# Patient Record
Sex: MALE | Race: WHITE | NOT HISPANIC OR LATINO | URBAN - METROPOLITAN AREA
[De-identification: names, ages, dates, MRNs, and addresses within clinical notes are randomized per-mention and may not be internally consistent; named-entity substitution may affect disease eponyms.]

---

## 2017-07-17 ENCOUNTER — EMERGENCY (EMERGENCY)
Facility: HOSPITAL | Age: 72
LOS: 1 days | Discharge: HOME | End: 2017-07-17
Attending: EMERGENCY MEDICINE | Admitting: EMERGENCY MEDICINE
Payer: COMMERCIAL

## 2017-07-17 VITALS
OXYGEN SATURATION: 98 % | DIASTOLIC BLOOD PRESSURE: 82 MMHG | HEART RATE: 79 BPM | TEMPERATURE: 98 F | RESPIRATION RATE: 17 BRPM | SYSTOLIC BLOOD PRESSURE: 122 MMHG

## 2017-07-17 VITALS — WEIGHT: 169.98 LBS

## 2017-07-17 DIAGNOSIS — E03.9 HYPOTHYROIDISM, UNSPECIFIED: ICD-10-CM

## 2017-07-17 DIAGNOSIS — K62.5 HEMORRHAGE OF ANUS AND RECTUM: ICD-10-CM

## 2017-07-17 DIAGNOSIS — K64.8 OTHER HEMORRHOIDS: ICD-10-CM

## 2017-07-17 DIAGNOSIS — Z96.642 PRESENCE OF LEFT ARTIFICIAL HIP JOINT: ICD-10-CM

## 2017-07-17 DIAGNOSIS — Z88.0 ALLERGY STATUS TO PENICILLIN: ICD-10-CM

## 2017-07-17 LAB
ALBUMIN SERPL ELPH-MCNC: 3.8 G/DL — SIGNIFICANT CHANGE UP (ref 3.3–5)
ALP SERPL-CCNC: 60 U/L — SIGNIFICANT CHANGE UP (ref 40–120)
ALT FLD-CCNC: 22 U/L — SIGNIFICANT CHANGE UP (ref 12–78)
ANION GAP SERPL CALC-SCNC: 4 MMOL/L — LOW (ref 5–17)
APTT BLD: 32 SEC — SIGNIFICANT CHANGE UP (ref 27.5–37.4)
AST SERPL-CCNC: 16 U/L — SIGNIFICANT CHANGE UP (ref 15–37)
BASOPHILS # BLD AUTO: 0.1 K/UL — SIGNIFICANT CHANGE UP (ref 0–0.2)
BASOPHILS NFR BLD AUTO: 1.5 % — SIGNIFICANT CHANGE UP (ref 0–2)
BILIRUB SERPL-MCNC: 0.2 MG/DL — SIGNIFICANT CHANGE UP (ref 0.2–1.2)
BUN SERPL-MCNC: 15 MG/DL — SIGNIFICANT CHANGE UP (ref 7–23)
CALCIUM SERPL-MCNC: 8.9 MG/DL — SIGNIFICANT CHANGE UP (ref 8.5–10.1)
CHLORIDE SERPL-SCNC: 103 MMOL/L — SIGNIFICANT CHANGE UP (ref 96–108)
CO2 SERPL-SCNC: 33 MMOL/L — HIGH (ref 22–31)
CREAT SERPL-MCNC: 0.84 MG/DL — SIGNIFICANT CHANGE UP (ref 0.5–1.3)
EOSINOPHIL # BLD AUTO: 0.3 K/UL — SIGNIFICANT CHANGE UP (ref 0–0.5)
EOSINOPHIL NFR BLD AUTO: 4.1 % — SIGNIFICANT CHANGE UP (ref 0–6)
GLUCOSE SERPL-MCNC: 92 MG/DL — SIGNIFICANT CHANGE UP (ref 70–99)
HCT VFR BLD CALC: 47.4 % — SIGNIFICANT CHANGE UP (ref 39–50)
HGB BLD-MCNC: 15.2 G/DL — SIGNIFICANT CHANGE UP (ref 13–17)
INR BLD: 1.07 RATIO — SIGNIFICANT CHANGE UP (ref 0.88–1.16)
LYMPHOCYTES # BLD AUTO: 1.1 K/UL — SIGNIFICANT CHANGE UP (ref 1–3.3)
LYMPHOCYTES # BLD AUTO: 16.8 % — SIGNIFICANT CHANGE UP (ref 13–44)
MCHC RBC-ENTMCNC: 27.9 PG — SIGNIFICANT CHANGE UP (ref 27–34)
MCHC RBC-ENTMCNC: 32 GM/DL — SIGNIFICANT CHANGE UP (ref 32–36)
MCV RBC AUTO: 87.2 FL — SIGNIFICANT CHANGE UP (ref 80–100)
MONOCYTES # BLD AUTO: 1.1 K/UL — HIGH (ref 0–0.9)
MONOCYTES NFR BLD AUTO: 15.8 % — HIGH (ref 1–9)
NEUTROPHILS # BLD AUTO: 4.2 K/UL — SIGNIFICANT CHANGE UP (ref 1.8–7.4)
NEUTROPHILS NFR BLD AUTO: 61.8 % — SIGNIFICANT CHANGE UP (ref 43–77)
OB PNL STL: POSITIVE
PLATELET # BLD AUTO: 281 K/UL — SIGNIFICANT CHANGE UP (ref 150–400)
POTASSIUM SERPL-MCNC: 4 MMOL/L — SIGNIFICANT CHANGE UP (ref 3.5–5.3)
POTASSIUM SERPL-SCNC: 4 MMOL/L — SIGNIFICANT CHANGE UP (ref 3.5–5.3)
PROT SERPL-MCNC: 7.6 G/DL — SIGNIFICANT CHANGE UP (ref 6–8.3)
PROTHROM AB SERPL-ACNC: 11.7 SEC — SIGNIFICANT CHANGE UP (ref 9.8–12.7)
RBC # BLD: 5.43 M/UL — SIGNIFICANT CHANGE UP (ref 4.2–5.8)
RBC # FLD: 12.9 % — SIGNIFICANT CHANGE UP (ref 10.3–14.5)
SODIUM SERPL-SCNC: 140 MMOL/L — SIGNIFICANT CHANGE UP (ref 135–145)
WBC # BLD: 6.8 K/UL — SIGNIFICANT CHANGE UP (ref 3.8–10.5)
WBC # FLD AUTO: 6.8 K/UL — SIGNIFICANT CHANGE UP (ref 3.8–10.5)

## 2017-07-17 PROCEDURE — 99284 EMERGENCY DEPT VISIT MOD MDM: CPT

## 2017-07-17 PROCEDURE — 85610 PROTHROMBIN TIME: CPT

## 2017-07-17 PROCEDURE — 36415 COLL VENOUS BLD VENIPUNCTURE: CPT

## 2017-07-17 PROCEDURE — 82272 OCCULT BLD FECES 1-3 TESTS: CPT

## 2017-07-17 PROCEDURE — 99283 EMERGENCY DEPT VISIT LOW MDM: CPT

## 2017-07-17 PROCEDURE — 80053 COMPREHEN METABOLIC PANEL: CPT

## 2017-07-17 PROCEDURE — 85730 THROMBOPLASTIN TIME PARTIAL: CPT

## 2017-07-17 PROCEDURE — 85027 COMPLETE CBC AUTOMATED: CPT

## 2017-07-17 NOTE — ED PROVIDER NOTE - GASTROINTESTINAL, MLM
Abdomen soft, non-tender, no guarding. rectal two hemorrhoids vault empty no stool on glove mucus sent on guaiac card

## 2017-07-17 NOTE — ED ADULT NURSE NOTE - CHPI ED SYMPTOMS NEG
no vomiting/no hematuria/no dysuria/no fever/no chills/no burning urination/no nausea/no abdominal distension

## 2017-07-17 NOTE — ED ADULT NURSE NOTE - OBJECTIVE STATEMENT
Pt c/o of rectal bleeding. Pt stated he had an hemorrhoid outside anus, hemorrhoid is in rectum, no pain, frequent bleeding episodes, visited PCP 1 week ago and prescribed kaopectate and miralax, no relief. Pt denies pain, nausea, vomiting, dizziness.

## 2017-07-17 NOTE — ED PROVIDER NOTE - OBJECTIVE STATEMENT
pt is a 70 yo male who was started on miralax on 7/10/17 by his pmd dr Emerson at [facility] after he complained of diarrhea on 7/3/17. he has no pain no fever no chills and was also found to have a hemorrhoid. 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 stools are otherwise normal

## 2017-07-18 DIAGNOSIS — Z96.642 PRESENCE OF LEFT ARTIFICIAL HIP JOINT: Chronic | ICD-10-CM

## 2017-07-27 ENCOUNTER — RESULT REVIEW (OUTPATIENT)
Age: 72
End: 2017-07-27

## 2018-08-17 ENCOUNTER — RESULT REVIEW (OUTPATIENT)
Age: 73
End: 2018-08-17

## 2018-10-23 ENCOUNTER — EMERGENCY (EMERGENCY)
Facility: HOSPITAL | Age: 73
LOS: 1 days | Discharge: HOME | End: 2018-10-23
Attending: EMERGENCY MEDICINE
Payer: COMMERCIAL

## 2018-10-23 VITALS
TEMPERATURE: 98 F | OXYGEN SATURATION: 97 % | HEART RATE: 92 BPM | DIASTOLIC BLOOD PRESSURE: 86 MMHG | WEIGHT: 167.99 LBS | SYSTOLIC BLOOD PRESSURE: 147 MMHG | RESPIRATION RATE: 15 BRPM

## 2018-10-23 VITALS
OXYGEN SATURATION: 98 % | HEART RATE: 74 BPM | DIASTOLIC BLOOD PRESSURE: 60 MMHG | SYSTOLIC BLOOD PRESSURE: 120 MMHG | TEMPERATURE: 98 F | RESPIRATION RATE: 14 BRPM

## 2018-10-23 DIAGNOSIS — Z96.642 PRESENCE OF LEFT ARTIFICIAL HIP JOINT: Chronic | ICD-10-CM

## 2018-10-23 PROBLEM — E03.9 HYPOTHYROIDISM, UNSPECIFIED: Chronic | Status: ACTIVE | Noted: 2017-07-17

## 2018-10-23 LAB
ALBUMIN SERPL ELPH-MCNC: 3.8 G/DL — SIGNIFICANT CHANGE UP (ref 3.3–5)
ALP SERPL-CCNC: 66 U/L — SIGNIFICANT CHANGE UP (ref 40–120)
ALT FLD-CCNC: 29 U/L — SIGNIFICANT CHANGE UP (ref 12–78)
AMYLASE P1 CFR SERPL: 33 U/L — SIGNIFICANT CHANGE UP (ref 25–115)
ANION GAP SERPL CALC-SCNC: 7 MMOL/L — SIGNIFICANT CHANGE UP (ref 5–17)
APPEARANCE UR: ABNORMAL
APTT BLD: 40.1 SEC — HIGH (ref 27.5–37.4)
AST SERPL-CCNC: 22 U/L — SIGNIFICANT CHANGE UP (ref 15–37)
BASOPHILS # BLD AUTO: 0.05 K/UL — SIGNIFICANT CHANGE UP (ref 0–0.2)
BASOPHILS NFR BLD AUTO: 0.7 % — SIGNIFICANT CHANGE UP (ref 0–2)
BILIRUB SERPL-MCNC: 0.5 MG/DL — SIGNIFICANT CHANGE UP (ref 0.2–1.2)
BILIRUB UR-MCNC: ABNORMAL
BUN SERPL-MCNC: 13 MG/DL — SIGNIFICANT CHANGE UP (ref 7–23)
CALCIUM SERPL-MCNC: 8.9 MG/DL — SIGNIFICANT CHANGE UP (ref 8.5–10.1)
CHLORIDE SERPL-SCNC: 101 MMOL/L — SIGNIFICANT CHANGE UP (ref 96–108)
CO2 SERPL-SCNC: 28 MMOL/L — SIGNIFICANT CHANGE UP (ref 22–31)
COLOR SPEC: YELLOW — SIGNIFICANT CHANGE UP
CREAT SERPL-MCNC: 1 MG/DL — SIGNIFICANT CHANGE UP (ref 0.5–1.3)
DIFF PNL FLD: ABNORMAL
EOSINOPHIL # BLD AUTO: 0.21 K/UL — SIGNIFICANT CHANGE UP (ref 0–0.5)
EOSINOPHIL NFR BLD AUTO: 2.8 % — SIGNIFICANT CHANGE UP (ref 0–6)
GLUCOSE SERPL-MCNC: 124 MG/DL — HIGH (ref 70–99)
GLUCOSE UR QL: NEGATIVE — SIGNIFICANT CHANGE UP
HCT VFR BLD CALC: 48.9 % — SIGNIFICANT CHANGE UP (ref 39–50)
HGB BLD-MCNC: 15.7 G/DL — SIGNIFICANT CHANGE UP (ref 13–17)
IMM GRANULOCYTES NFR BLD AUTO: 0.5 % — SIGNIFICANT CHANGE UP (ref 0–1.5)
INR BLD: 1.13 RATIO — SIGNIFICANT CHANGE UP (ref 0.88–1.16)
KETONES UR-MCNC: ABNORMAL
LEUKOCYTE ESTERASE UR-ACNC: ABNORMAL
LIDOCAIN IGE QN: 66 U/L — LOW (ref 73–393)
LYMPHOCYTES # BLD AUTO: 1.24 K/UL — SIGNIFICANT CHANGE UP (ref 1–3.3)
LYMPHOCYTES # BLD AUTO: 16.3 % — SIGNIFICANT CHANGE UP (ref 13–44)
MCHC RBC-ENTMCNC: 27.5 PG — SIGNIFICANT CHANGE UP (ref 27–34)
MCHC RBC-ENTMCNC: 32.1 GM/DL — SIGNIFICANT CHANGE UP (ref 32–36)
MCV RBC AUTO: 85.6 FL — SIGNIFICANT CHANGE UP (ref 80–100)
MONOCYTES # BLD AUTO: 0.87 K/UL — SIGNIFICANT CHANGE UP (ref 0–0.9)
MONOCYTES NFR BLD AUTO: 11.4 % — SIGNIFICANT CHANGE UP (ref 2–14)
NEUTROPHILS # BLD AUTO: 5.2 K/UL — SIGNIFICANT CHANGE UP (ref 1.8–7.4)
NEUTROPHILS NFR BLD AUTO: 68.3 % — SIGNIFICANT CHANGE UP (ref 43–77)
NITRITE UR-MCNC: NEGATIVE — SIGNIFICANT CHANGE UP
PH UR: 6.5 — SIGNIFICANT CHANGE UP (ref 5–8)
PLATELET # BLD AUTO: 307 K/UL — SIGNIFICANT CHANGE UP (ref 150–400)
POTASSIUM SERPL-MCNC: 4.3 MMOL/L — SIGNIFICANT CHANGE UP (ref 3.5–5.3)
POTASSIUM SERPL-SCNC: 4.3 MMOL/L — SIGNIFICANT CHANGE UP (ref 3.5–5.3)
PROT SERPL-MCNC: 8.6 G/DL — HIGH (ref 6–8.3)
PROT UR-MCNC: NEGATIVE — SIGNIFICANT CHANGE UP
PROTHROM AB SERPL-ACNC: 12.3 SEC — SIGNIFICANT CHANGE UP (ref 9.8–12.7)
RBC # BLD: 5.71 M/UL — SIGNIFICANT CHANGE UP (ref 4.2–5.8)
RBC # FLD: 13.4 % — SIGNIFICANT CHANGE UP (ref 10.3–14.5)
SODIUM SERPL-SCNC: 136 MMOL/L — SIGNIFICANT CHANGE UP (ref 135–145)
SP GR SPEC: 1.01 — SIGNIFICANT CHANGE UP (ref 1.01–1.02)
UROBILINOGEN FLD QL: NEGATIVE — SIGNIFICANT CHANGE UP
WBC # BLD: 7.61 K/UL — SIGNIFICANT CHANGE UP (ref 3.8–10.5)
WBC # FLD AUTO: 7.61 K/UL — SIGNIFICANT CHANGE UP (ref 3.8–10.5)

## 2018-10-23 PROCEDURE — 99284 EMERGENCY DEPT VISIT MOD MDM: CPT

## 2018-10-23 PROCEDURE — 85730 THROMBOPLASTIN TIME PARTIAL: CPT

## 2018-10-23 PROCEDURE — 83690 ASSAY OF LIPASE: CPT

## 2018-10-23 PROCEDURE — 86900 BLOOD TYPING SEROLOGIC ABO: CPT

## 2018-10-23 PROCEDURE — 86901 BLOOD TYPING SEROLOGIC RH(D): CPT

## 2018-10-23 PROCEDURE — 99284 EMERGENCY DEPT VISIT MOD MDM: CPT | Mod: 25

## 2018-10-23 PROCEDURE — 86850 RBC ANTIBODY SCREEN: CPT

## 2018-10-23 PROCEDURE — 74177 CT ABD & PELVIS W/CONTRAST: CPT

## 2018-10-23 PROCEDURE — 85027 COMPLETE CBC AUTOMATED: CPT

## 2018-10-23 PROCEDURE — 81001 URINALYSIS AUTO W/SCOPE: CPT

## 2018-10-23 PROCEDURE — 80053 COMPREHEN METABOLIC PANEL: CPT

## 2018-10-23 PROCEDURE — 82150 ASSAY OF AMYLASE: CPT

## 2018-10-23 PROCEDURE — 85610 PROTHROMBIN TIME: CPT

## 2018-10-23 PROCEDURE — 36415 COLL VENOUS BLD VENIPUNCTURE: CPT

## 2018-10-23 PROCEDURE — 74177 CT ABD & PELVIS W/CONTRAST: CPT | Mod: 26

## 2018-10-23 RX ORDER — ZOLPIDEM TARTRATE 10 MG/1
1 TABLET ORAL
Qty: 0 | Refills: 0 | COMMUNITY

## 2018-10-23 RX ORDER — IOHEXOL 300 MG/ML
30 INJECTION, SOLUTION INTRAVENOUS ONCE
Qty: 0 | Refills: 0 | Status: COMPLETED | OUTPATIENT
Start: 2018-10-23 | End: 2018-10-23

## 2018-10-23 RX ADMIN — IOHEXOL 30 MILLILITER(S): 300 INJECTION, SOLUTION INTRAVENOUS at 09:43

## 2018-10-23 NOTE — ED PROVIDER NOTE - OBJECTIVE STATEMENT
72y M hx of UC on Apriso, hypothyroid, no AC here with c/o bloody stools x9 days assoc w lower abd pain. Pt reports brown stool mixed with bright red blood, 4-5 episodes per day. Pt notes lower abd pain for past 7 days. Pain initially intermittent and now constant in last 5 days or so. Pain is 10/10 at worst which occurs while defecating. Last colo was Aug 2018. No f/c. +Nausea, no vomiting. No dizziness, lightheadedness, chills.   GI-Acit  PCP-Larissa Emerson

## 2018-10-23 NOTE — ED PROVIDER NOTE - PROGRESS NOTE DETAILS
Dr. Bejarano: CT read resulted, Call out to pt GI Dr Chavira. Dr. Bejarano:  D/w GI Dr Chavira. Rec steroid taper. C/w Apriso. FU as OP.

## 2018-10-23 NOTE — ED PROVIDER NOTE - MEDICAL DECISION MAKING DETAILS
72y M not on AC hx of UC on Apriso here with abd pain x1 week and bloody stools for past 9 days. Pain now worsening. Concern for UC flare, abscess, fistula. Check labs, UA, CTAP.

## 2018-10-23 NOTE — ED ADULT NURSE NOTE - ED STAT RN HANDOFF DETAILS
Patient is reevaluated by Dr. fry. Patient got discharge by Dr. Fry . Explained all discharge instructions.

## 2018-10-23 NOTE — ED ADULT NURSE NOTE - OBJECTIVE STATEMENT
Received the patient in the Er. Patient is alert and oriented. Skin warm and dry. c/O abdominal pain. Abdomen soft and tender. C/O diarrhea with blood. As per patient he has colitis.

## 2018-10-23 NOTE — ED PROVIDER NOTE - PHYSICAL EXAMINATION
Gen: WNWD NAD  HEENT: NCAT PERRL EOMI no conjunctival pallor normal pharynx pink mm  Neck: supple  CV: RRR, no murmur  Lung: CTA BL  Abd: +BS soft ND BL lower quadrant TTP no grr, no inguinal TTP or swelling   Ext: wwp, palp pulses, FROMx4, no cce  Neuro: A&Ox3, CN grossly intact, sensation intact, motor 5/5 throughout

## 2019-01-08 ENCOUNTER — INPATIENT (INPATIENT)
Facility: HOSPITAL | Age: 74
LOS: 4 days | Discharge: HOME | DRG: 387 | End: 2019-01-13
Attending: HOSPITALIST | Admitting: HOSPITALIST
Payer: MEDICARE

## 2019-01-08 VITALS
TEMPERATURE: 98 F | DIASTOLIC BLOOD PRESSURE: 76 MMHG | OXYGEN SATURATION: 96 % | HEART RATE: 108 BPM | SYSTOLIC BLOOD PRESSURE: 121 MMHG | RESPIRATION RATE: 16 BRPM | WEIGHT: 166.01 LBS | HEIGHT: 67 IN

## 2019-01-08 DIAGNOSIS — K51.00 ULCERATIVE (CHRONIC) PANCOLITIS WITHOUT COMPLICATIONS: ICD-10-CM

## 2019-01-08 DIAGNOSIS — Z96.642 PRESENCE OF LEFT ARTIFICIAL HIP JOINT: Chronic | ICD-10-CM

## 2019-01-08 DIAGNOSIS — Z29.9 ENCOUNTER FOR PROPHYLACTIC MEASURES, UNSPECIFIED: ICD-10-CM

## 2019-01-08 DIAGNOSIS — E03.9 HYPOTHYROIDISM, UNSPECIFIED: ICD-10-CM

## 2019-01-08 LAB
ALBUMIN SERPL ELPH-MCNC: 2.8 G/DL — LOW (ref 3.3–5)
ALP SERPL-CCNC: 51 U/L — SIGNIFICANT CHANGE UP (ref 30–120)
ALT FLD-CCNC: 20 U/L DA — SIGNIFICANT CHANGE UP (ref 10–60)
ANION GAP SERPL CALC-SCNC: 10 MMOL/L — SIGNIFICANT CHANGE UP (ref 5–17)
APPEARANCE UR: CLEAR — SIGNIFICANT CHANGE UP
APTT BLD: 34.9 SEC — SIGNIFICANT CHANGE UP (ref 28.5–37)
AST SERPL-CCNC: 14 U/L — SIGNIFICANT CHANGE UP (ref 10–40)
BACTERIA # UR AUTO: ABNORMAL
BASOPHILS # BLD AUTO: 0.03 K/UL — SIGNIFICANT CHANGE UP (ref 0–0.2)
BASOPHILS NFR BLD AUTO: 0.2 % — SIGNIFICANT CHANGE UP (ref 0–2)
BILIRUB SERPL-MCNC: 0.4 MG/DL — SIGNIFICANT CHANGE UP (ref 0.2–1.2)
BILIRUB UR-MCNC: NEGATIVE — SIGNIFICANT CHANGE UP
BUN SERPL-MCNC: 8 MG/DL — SIGNIFICANT CHANGE UP (ref 7–23)
CALCIUM SERPL-MCNC: 9.2 MG/DL — SIGNIFICANT CHANGE UP (ref 8.4–10.5)
CHLORIDE SERPL-SCNC: 100 MMOL/L — SIGNIFICANT CHANGE UP (ref 96–108)
CO2 SERPL-SCNC: 28 MMOL/L — SIGNIFICANT CHANGE UP (ref 22–31)
COLOR SPEC: YELLOW — SIGNIFICANT CHANGE UP
CREAT SERPL-MCNC: 1.04 MG/DL — SIGNIFICANT CHANGE UP (ref 0.5–1.3)
DIFF PNL FLD: NEGATIVE — SIGNIFICANT CHANGE UP
EOSINOPHIL # BLD AUTO: 0.44 K/UL — SIGNIFICANT CHANGE UP (ref 0–0.5)
EOSINOPHIL NFR BLD AUTO: 3.6 % — SIGNIFICANT CHANGE UP (ref 0–6)
GLUCOSE SERPL-MCNC: 94 MG/DL — SIGNIFICANT CHANGE UP (ref 70–99)
GLUCOSE UR QL: NEGATIVE MG/DL — SIGNIFICANT CHANGE UP
HCT VFR BLD CALC: 42.3 % — SIGNIFICANT CHANGE UP (ref 39–50)
HGB BLD-MCNC: 13.8 G/DL — SIGNIFICANT CHANGE UP (ref 13–17)
IMM GRANULOCYTES NFR BLD AUTO: 1.8 % — HIGH (ref 0–1.5)
INR BLD: 1.34 RATIO — HIGH (ref 0.88–1.16)
KETONES UR-MCNC: ABNORMAL
LEUKOCYTE ESTERASE UR-ACNC: NEGATIVE — SIGNIFICANT CHANGE UP
LIDOCAIN IGE QN: 50 U/L — LOW (ref 73–393)
LYMPHOCYTES # BLD AUTO: 1.43 K/UL — SIGNIFICANT CHANGE UP (ref 1–3.3)
LYMPHOCYTES # BLD AUTO: 11.7 % — LOW (ref 13–44)
MCHC RBC-ENTMCNC: 27.5 PG — SIGNIFICANT CHANGE UP (ref 27–34)
MCHC RBC-ENTMCNC: 32.6 GM/DL — SIGNIFICANT CHANGE UP (ref 32–36)
MCV RBC AUTO: 84.4 FL — SIGNIFICANT CHANGE UP (ref 80–100)
MONOCYTES # BLD AUTO: 1.02 K/UL — HIGH (ref 0–0.9)
MONOCYTES NFR BLD AUTO: 8.4 % — SIGNIFICANT CHANGE UP (ref 2–14)
NEUTROPHILS # BLD AUTO: 9.04 K/UL — HIGH (ref 1.8–7.4)
NEUTROPHILS NFR BLD AUTO: 74.3 % — SIGNIFICANT CHANGE UP (ref 43–77)
NITRITE UR-MCNC: NEGATIVE — SIGNIFICANT CHANGE UP
NRBC # BLD: 0 /100 WBCS — SIGNIFICANT CHANGE UP (ref 0–0)
PH UR: 7 — SIGNIFICANT CHANGE UP (ref 5–8)
PLATELET # BLD AUTO: 114 K/UL — LOW (ref 150–400)
POTASSIUM SERPL-MCNC: 3.9 MMOL/L — SIGNIFICANT CHANGE UP (ref 3.5–5.3)
POTASSIUM SERPL-SCNC: 3.9 MMOL/L — SIGNIFICANT CHANGE UP (ref 3.5–5.3)
PROT SERPL-MCNC: 7.4 G/DL — SIGNIFICANT CHANGE UP (ref 6–8.3)
PROT UR-MCNC: 15 MG/DL
PROTHROM AB SERPL-ACNC: 14.7 SEC — HIGH (ref 10–12.9)
RBC # BLD: 5.01 M/UL — SIGNIFICANT CHANGE UP (ref 4.2–5.8)
RBC # FLD: 13 % — SIGNIFICANT CHANGE UP (ref 10.3–14.5)
SODIUM SERPL-SCNC: 138 MMOL/L — SIGNIFICANT CHANGE UP (ref 135–145)
SP GR SPEC: 1 — LOW (ref 1.01–1.02)
UROBILINOGEN FLD QL: NEGATIVE MG/DL — SIGNIFICANT CHANGE UP
WBC # BLD: 12.18 K/UL — HIGH (ref 3.8–10.5)
WBC # FLD AUTO: 12.18 K/UL — HIGH (ref 3.8–10.5)

## 2019-01-08 PROCEDURE — 99223 1ST HOSP IP/OBS HIGH 75: CPT | Mod: AI

## 2019-01-08 PROCEDURE — 74177 CT ABD & PELVIS W/CONTRAST: CPT | Mod: 26

## 2019-01-08 PROCEDURE — 99285 EMERGENCY DEPT VISIT HI MDM: CPT

## 2019-01-08 PROCEDURE — 93010 ELECTROCARDIOGRAM REPORT: CPT

## 2019-01-08 RX ORDER — ZOLPIDEM TARTRATE 10 MG/1
5 TABLET ORAL AT BEDTIME
Qty: 0 | Refills: 0 | Status: DISCONTINUED | OUTPATIENT
Start: 2019-01-08 | End: 2019-01-13

## 2019-01-08 RX ORDER — CIPROFLOXACIN LACTATE 400MG/40ML
400 VIAL (ML) INTRAVENOUS EVERY 12 HOURS
Qty: 0 | Refills: 0 | Status: DISCONTINUED | OUTPATIENT
Start: 2019-01-08 | End: 2019-01-09

## 2019-01-08 RX ORDER — METRONIDAZOLE 500 MG
500 TABLET ORAL ONCE
Qty: 0 | Refills: 0 | Status: COMPLETED | OUTPATIENT
Start: 2019-01-08 | End: 2019-01-08

## 2019-01-08 RX ORDER — METRONIDAZOLE 500 MG
500 TABLET ORAL EVERY 8 HOURS
Qty: 0 | Refills: 0 | Status: DISCONTINUED | OUTPATIENT
Start: 2019-01-08 | End: 2019-01-09

## 2019-01-08 RX ORDER — VANCOMYCIN HCL 1 G
125 VIAL (EA) INTRAVENOUS EVERY 6 HOURS
Qty: 0 | Refills: 0 | Status: DISCONTINUED | OUTPATIENT
Start: 2019-01-08 | End: 2019-01-09

## 2019-01-08 RX ORDER — SODIUM CHLORIDE 9 MG/ML
1000 INJECTION, SOLUTION INTRAVENOUS
Qty: 0 | Refills: 0 | Status: DISCONTINUED | OUTPATIENT
Start: 2019-01-08 | End: 2019-01-13

## 2019-01-08 RX ORDER — PIPERACILLIN AND TAZOBACTAM 4; .5 G/20ML; G/20ML
3.38 INJECTION, POWDER, LYOPHILIZED, FOR SOLUTION INTRAVENOUS ONCE
Qty: 0 | Refills: 0 | Status: DISCONTINUED | OUTPATIENT
Start: 2019-01-08 | End: 2019-01-08

## 2019-01-08 RX ORDER — MESALAMINE 400 MG
4 TABLET, DELAYED RELEASE (ENTERIC COATED) ORAL
Qty: 0 | Refills: 0 | COMMUNITY

## 2019-01-08 RX ORDER — IOHEXOL 300 MG/ML
30 INJECTION, SOLUTION INTRAVENOUS ONCE
Qty: 0 | Refills: 0 | Status: COMPLETED | OUTPATIENT
Start: 2019-01-08 | End: 2019-01-08

## 2019-01-08 RX ORDER — ZOLPIDEM TARTRATE 10 MG/1
0 TABLET ORAL
Qty: 0 | Refills: 0 | COMMUNITY

## 2019-01-08 RX ORDER — LEVOTHYROXINE SODIUM 125 MCG
75 TABLET ORAL DAILY
Qty: 0 | Refills: 0 | Status: DISCONTINUED | OUTPATIENT
Start: 2019-01-08 | End: 2019-01-13

## 2019-01-08 RX ORDER — LEVOTHYROXINE SODIUM 125 MCG
0 TABLET ORAL
Qty: 0 | Refills: 0 | COMMUNITY

## 2019-01-08 RX ORDER — SODIUM CHLORIDE 9 MG/ML
1000 INJECTION INTRAMUSCULAR; INTRAVENOUS; SUBCUTANEOUS
Qty: 0 | Refills: 0 | Status: COMPLETED | OUTPATIENT
Start: 2019-01-08 | End: 2019-01-08

## 2019-01-08 RX ADMIN — SODIUM CHLORIDE 1000 MILLILITER(S): 9 INJECTION INTRAMUSCULAR; INTRAVENOUS; SUBCUTANEOUS at 18:20

## 2019-01-08 RX ADMIN — Medication 200 MILLIGRAM(S): at 21:15

## 2019-01-08 RX ADMIN — SODIUM CHLORIDE 75 MILLILITER(S): 9 INJECTION, SOLUTION INTRAVENOUS at 21:35

## 2019-01-08 RX ADMIN — SODIUM CHLORIDE 1000 MILLILITER(S): 9 INJECTION INTRAMUSCULAR; INTRAVENOUS; SUBCUTANEOUS at 17:14

## 2019-01-08 RX ADMIN — SODIUM CHLORIDE 1000 MILLILITER(S): 9 INJECTION INTRAMUSCULAR; INTRAVENOUS; SUBCUTANEOUS at 18:45

## 2019-01-08 RX ADMIN — IOHEXOL 30 MILLILITER(S): 300 INJECTION, SOLUTION INTRAVENOUS at 17:13

## 2019-01-08 RX ADMIN — Medication 100 MILLIGRAM(S): at 22:17

## 2019-01-08 NOTE — ED ADULT NURSE NOTE - CHPI ED NUR SYMPTOMS NEG
no burning urination/no vomiting/no nausea/no abdominal distension/no blood in stool/no chills/no fever/no hematuria

## 2019-01-08 NOTE — H&P ADULT - NSHPSOCIALHISTORY_GEN_ALL_CORE
+ former smoker (quit about 20 years ago, was a 1ppk per week for about 30 years)  - denies etoh use  + did inhaled cocaine in the 80s (no IVDU)

## 2019-01-08 NOTE — H&P ADULT - NSHPPHYSICALEXAM_GEN_ALL_CORE
PHYSICAL EXAM:  Vital Signs Last 24 Hrs  T(C): 36.6 (08 Jan 2019 16:06), Max: 36.6 (08 Jan 2019 16:06)  T(F): 97.8 (08 Jan 2019 16:06), Max: 97.8 (08 Jan 2019 16:06)  HR: 108 (08 Jan 2019 16:06) (108 - 108)  BP: 121/76 (08 Jan 2019 16:06) (121/76 - 121/76)  BP(mean): --  RR: 16 (08 Jan 2019 16:06) (16 - 16)  SpO2: 96% (08 Jan 2019 16:06) (96% - 96%)    GENERAL:     NAD, well-groomed, well-developed  HEAD:     atraumatic, normocephalic  EYES:     EOMI, conjunctiva and sclera clear  ENMT:     no tonsillar erythema or exudates or enlargement, no oral lesions, moist mucous membranes, good dentition  NECK:     supple, no JVD  RESPIRATORY:     clear to auscultation bilaterally, no rales or rhonchi or wheezing or rubs  CARDIOVASCULAR:     regular rate and rhythm, no murmurs or rubs or gallops, 2+ peripheral pulses  GASTROINTESTINAL:     soft, slightly tender to palpation in LLQ, nondistended, no hepatosplenomegaly palpated, bowel sounds present  EXTREMITIES:     no clubbing or cyanosis or edema  MUSCULOSKELETAL:     no joint pain or swelling or deformities  NERVOUS SYSTEM:     motor strength intact with 5/5 B/L upper and lower extremities, no gross sensory deficits  SKIN:     no rashes or lesions  PSYCH:     appropriate, alert and orientated x3, good concentration

## 2019-01-08 NOTE — ED ADULT TRIAGE NOTE - CHIEF COMPLAINT QUOTE
" Dr Sebastian sent me here, I have left lower abdominal pain since 12/28, I have colitis with cdiff, on vancomycin "

## 2019-01-08 NOTE — H&P ADULT - NSHPLABSRESULTS_GEN_ALL_CORE
LABS:                        13.8   12.18<H> )-----------( 114<L>    ( 08 Jan 2019 17:14 )             42.3     138    |  100    |  8      ----------------------------<  94       08 Jan 2019 17:14  3.9     |  28     |  1.04         Ca 9.2           08 Jan 2019 17:14        TPro  7.4    /  Alb  2.8<L>  /  TBili  0.4    /  DBili  x      /  AST  14     /  ALT  20     /  AlkPhos  51     08 Jan 2019 17:14    PT/INR - ( 08 Jan 2019 17:14 )   PT: 14.7<H>;   INR: 1.34<H>         PTT - ( 08 Jan 2019 17:14 )  PTT:34.9     Radiology:  < from: CT Abdomen and Pelvis w/ Oral Cont and w/ IV Cont (01.08.19 @ 19:59) >    FINDINGS: There are atherosclerotic calcifications of the imaged coronary   arteries, aorta, and branch vessels. The imaged portions of the aorta are   normal in caliber. The lung bases are unremarkable.    There is hepatic steatosis. The gallbladder, biliary tree, pancreas,   spleen, and adrenal glands appear unremarkable.    There is a tiny low-attenuation focus in the left kidney which appears  unchanged. A right renal cyst appears unchanged off the lower pole. There   is no hydroureteronephrosis bilaterally. The urinary bladder appears   unremarkable.    There are multiple scattered nonspecific subcentimeter retroperitoneal   and mesenteric lymph nodes.    There is no bowel obstruction or abdominal ascites. There is diffuse   pancolonic wall thickening with adjacent fat stranding. There is also   involvement of the rectum. Colonic diverticulosis is noted. The appendix   is unremarkable.    A punctate calcification is noted within the left-sided the prostate   gland. There are moderate-sized bilateral fat containing inguinal hernias.    < end of copied text >        Multilevel degenerative changes are noted within the imaged potions of   the spine. A left-sided total hip arthroplasty is noted.    IMPRESSION: Pancolitis.

## 2019-01-08 NOTE — H&P ADULT - PROBLEM SELECTOR PLAN 3
IMPROVE VTE Individual Risk Assessment          RISK                                                          Points  [  ] Previous VTE                                                 3  [  ] Thrombophilia                                              2  [  ] Lower limb paralysis                                    2        (unable to hold up >15 seconds)    [  ] Current Cancer                                             2         (within 6 months)  [  ] Immobilization > 24 hrs                              1  [  ] ICU/CCU stay > 24 hours                            1  [X] Age > 60                                                        1    IMPROVE VTE Score 1    - will hold AC as patient is at risk for bleeding given his ulcerative colitis/bloody diarrhea history, SCD only

## 2019-01-08 NOTE — ED ADULT NURSE NOTE - OBJECTIVE STATEMENT
Sent by Dr Chavira to rule out colitis, c/o LL pain since 12/27/18. Diagnosed on 12/15 with C Diff. Symptoms resolved and returned, now on PO Vanco.

## 2019-01-08 NOTE — H&P ADULT - HISTORY OF PRESENT ILLNESS
73M with hypothyroidism and ulcerative colitis who presents with abdominal pain.  Symptoms initially started in October with diarrhea and was prescribed steroids by GI.  Had a colonoscopy that showed colitis.  In December, patient was then diagnosed with Cdiff and was prescribed 14 days of oral vancomycin (finished Esperance day).  Did not have any abdominal pain at that time.  The diarrhea would be loose and watery with intermittent bleeding.  Then patient started having LLQ pain starting New Years Yolanda (about 8-9 days ago).  No radiation.  Worse with eating but better with lying down.  Pain also abates after he defecates and moves his bowels.  Associated with decreased appetite.  Denied any fevers.  Patient spoke to GI again and was prescribed oral vancomycin again on Saturday, 1/5.  Started to have some nausea but no vomiting.   Pain did not improve the next couple of days, went to GI today, and was told to come to the ED for further evaluation and treatment.  However,  patient did admit that his pain is improving starting today.  In the ED, CT showed pancolitis.  Patient being admitted for colitis, possible Cdiff pancolitis.

## 2019-01-08 NOTE — ED PROVIDER NOTE - MEDICAL DECISION MAKING DETAILS
72 yo M, Hx of colitis, with worsening LLQ abd pain and watery diarrhea for several days. Sent for CT scan to evaluate further.

## 2019-01-08 NOTE — H&P ADULT - NSHPREVIEWOFSYSTEMS_GEN_ALL_CORE
REVIEW OF SYSTEMS:  CONSTITUTIONAL:    no fever or weight loss or fatigue  EYES:    no eye pain or visual disturbances or discharge  ENMT:     no difficulty hearing or tinnitus or vertigo, no sinus or throat pain  NECK:    no pain or stiffness  RESPIRATORY:    no cough or wheezing or chills or hemoptysis, no shortness of breath  CARDIOVASCULAR:    no chest pain or palpitations or dizziness or leg swelling  GASTROINTESTINAL:    +abdominal pain, +nausea, +diarrhea  GENITOURINARY:    no dysuria or frequency or hematuria or incontinence  NEUROLOGICAL:    no headaches or memory loss or loss of strength or numbness or tremors  SKIN:    no itching or burning or rashes or lesions   LYMPH NODES:    no enlarged glands  ENDOCRINE:    no heat or cold intolerance, no hair loss, no polydipsia or polyuria  MUSCULOSKELETAL:    no joint pain or swelling, no muscle or back or extremity pain  PSYCHIATRIC:    no depression or anxiety or mood swings or difficulty sleeping  HEME/LYMPH:    no easy bruising or bleeding gums  ALLERGY AND IMMUNOLOGIC:    no hives or eczema

## 2019-01-08 NOTE — ED ADULT NURSE NOTE - GI STOOL AMOUNT
Patient has an appointment in August. He would like to do pre-clinic lab work to include a Hemaglobin AiC. Please put orders in Epic.    medium

## 2019-01-08 NOTE — ED PROVIDER NOTE - OBJECTIVE STATEMENT
Pt is a 72 y/o M, with PMHx of hypothyroidism and colitis, presenting to the ED with c/o 10 days of abd pain and diarrhea. Pt reports LLQ abd pain that has been constant, non radiating, and getting worse. Also having multiple episodes of diarrhea a day, with mixed form stools and watery stools. Intermittent episodes of blood tinged stool. Pt had a stool culture down showing C diff and placed on vancomycin. However, had persistent pain and went to his GI today. Told he had colitis but advised to come to the ED for eval of pain. Denies fever, vomiting, urinary sxs, CP, and SOB. last colonoscopy in 10/2018 showing colitis.  Karlie REYES

## 2019-01-08 NOTE — H&P ADULT - PROBLEM SELECTOR PLAN 1
- admitted to medicine  - f/u GI (Dr. Chavira)  - cont with cipro and flagyl  - will add oral vanco for Cdiff   - f/u Cdiff  - NPO  - IVF hydration  - pain control as needed  - monitor CBC

## 2019-01-08 NOTE — H&P ADULT - ASSESSMENT
73M with hypothyroidism and ulcerative colitis who presents with abdominal pain, found to have pancolitis, likely Cdiff colitis.

## 2019-01-09 DIAGNOSIS — K52.9 NONINFECTIVE GASTROENTERITIS AND COLITIS, UNSPECIFIED: ICD-10-CM

## 2019-01-09 LAB
ALBUMIN SERPL ELPH-MCNC: 2.3 G/DL — LOW (ref 3.3–5)
ALP SERPL-CCNC: 44 U/L — SIGNIFICANT CHANGE UP (ref 30–120)
ALT FLD-CCNC: 15 U/L DA — SIGNIFICANT CHANGE UP (ref 10–60)
ANION GAP SERPL CALC-SCNC: 12 MMOL/L — SIGNIFICANT CHANGE UP (ref 5–17)
AST SERPL-CCNC: 13 U/L — SIGNIFICANT CHANGE UP (ref 10–40)
BASOPHILS # BLD AUTO: 0.03 K/UL — SIGNIFICANT CHANGE UP (ref 0–0.2)
BASOPHILS NFR BLD AUTO: 0.3 % — SIGNIFICANT CHANGE UP (ref 0–2)
BILIRUB SERPL-MCNC: 0.4 MG/DL — SIGNIFICANT CHANGE UP (ref 0.2–1.2)
BUN SERPL-MCNC: 5 MG/DL — LOW (ref 7–23)
C DIFF BY PCR RESULT: SIGNIFICANT CHANGE UP
C DIFF TOX GENS STL QL NAA+PROBE: SIGNIFICANT CHANGE UP
CALCIUM SERPL-MCNC: 8.2 MG/DL — LOW (ref 8.4–10.5)
CHLORIDE SERPL-SCNC: 100 MMOL/L — SIGNIFICANT CHANGE UP (ref 96–108)
CO2 SERPL-SCNC: 24 MMOL/L — SIGNIFICANT CHANGE UP (ref 22–31)
CREAT SERPL-MCNC: 0.92 MG/DL — SIGNIFICANT CHANGE UP (ref 0.5–1.3)
CULTURE RESULTS: SIGNIFICANT CHANGE UP
EOSINOPHIL # BLD AUTO: 0.23 K/UL — SIGNIFICANT CHANGE UP (ref 0–0.5)
EOSINOPHIL NFR BLD AUTO: 2 % — SIGNIFICANT CHANGE UP (ref 0–6)
GLUCOSE SERPL-MCNC: 100 MG/DL — HIGH (ref 70–99)
HCT VFR BLD CALC: 36.8 % — LOW (ref 39–50)
HGB BLD-MCNC: 11.8 G/DL — LOW (ref 13–17)
IMM GRANULOCYTES NFR BLD AUTO: 1 % — SIGNIFICANT CHANGE UP (ref 0–1.5)
LYMPHOCYTES # BLD AUTO: 1.08 K/UL — SIGNIFICANT CHANGE UP (ref 1–3.3)
LYMPHOCYTES # BLD AUTO: 9.5 % — LOW (ref 13–44)
MAGNESIUM SERPL-MCNC: 1.4 MG/DL — LOW (ref 1.6–2.6)
MCHC RBC-ENTMCNC: 26.8 PG — LOW (ref 27–34)
MCHC RBC-ENTMCNC: 32.1 GM/DL — SIGNIFICANT CHANGE UP (ref 32–36)
MCV RBC AUTO: 83.4 FL — SIGNIFICANT CHANGE UP (ref 80–100)
MONOCYTES # BLD AUTO: 1.09 K/UL — HIGH (ref 0–0.9)
MONOCYTES NFR BLD AUTO: 9.6 % — SIGNIFICANT CHANGE UP (ref 2–14)
NEUTROPHILS # BLD AUTO: 8.81 K/UL — HIGH (ref 1.8–7.4)
NEUTROPHILS NFR BLD AUTO: 77.6 % — HIGH (ref 43–77)
PHOSPHATE SERPL-MCNC: 3.3 MG/DL — SIGNIFICANT CHANGE UP (ref 2.5–4.5)
PLATELET # BLD AUTO: 110 K/UL — LOW (ref 150–400)
POTASSIUM SERPL-MCNC: 3.4 MMOL/L — LOW (ref 3.5–5.3)
POTASSIUM SERPL-SCNC: 3.4 MMOL/L — LOW (ref 3.5–5.3)
PROT SERPL-MCNC: 6.2 G/DL — SIGNIFICANT CHANGE UP (ref 6–8.3)
RBC # BLD: 4.41 M/UL — SIGNIFICANT CHANGE UP (ref 4.2–5.8)
RBC # FLD: 12.9 % — SIGNIFICANT CHANGE UP (ref 10.3–14.5)
SODIUM SERPL-SCNC: 136 MMOL/L — SIGNIFICANT CHANGE UP (ref 135–145)
SPECIMEN SOURCE: SIGNIFICANT CHANGE UP
TSH SERPL-MCNC: 0.82 UIU/ML — SIGNIFICANT CHANGE UP (ref 0.27–4.2)
WBC # BLD: 11.35 K/UL — HIGH (ref 3.8–10.5)
WBC # FLD AUTO: 11.35 K/UL — HIGH (ref 3.8–10.5)

## 2019-01-09 PROCEDURE — 99232 SBSQ HOSP IP/OBS MODERATE 35: CPT

## 2019-01-09 RX ORDER — MESALAMINE 400 MG
1200 TABLET, DELAYED RELEASE (ENTERIC COATED) ORAL THREE TIMES A DAY
Qty: 0 | Refills: 0 | Status: DISCONTINUED | OUTPATIENT
Start: 2019-01-09 | End: 2019-01-13

## 2019-01-09 RX ORDER — POTASSIUM CHLORIDE 20 MEQ
20 PACKET (EA) ORAL ONCE
Qty: 0 | Refills: 0 | Status: COMPLETED | OUTPATIENT
Start: 2019-01-09 | End: 2019-01-09

## 2019-01-09 RX ORDER — CHOLESTYRAMINE 4 G/9G
4 POWDER, FOR SUSPENSION ORAL DAILY
Qty: 0 | Refills: 0 | Status: DISCONTINUED | OUTPATIENT
Start: 2019-01-09 | End: 2019-01-13

## 2019-01-09 RX ORDER — MAGNESIUM SULFATE 500 MG/ML
1 VIAL (ML) INJECTION ONCE
Qty: 0 | Refills: 0 | Status: COMPLETED | OUTPATIENT
Start: 2019-01-09 | End: 2019-01-09

## 2019-01-09 RX ADMIN — CHOLESTYRAMINE 4 GRAM(S): 4 POWDER, FOR SUSPENSION ORAL at 12:00

## 2019-01-09 RX ADMIN — Medication 100 MILLIGRAM(S): at 05:33

## 2019-01-09 RX ADMIN — Medication 100 GRAM(S): at 10:19

## 2019-01-09 RX ADMIN — Medication 125 MILLIGRAM(S): at 11:59

## 2019-01-09 RX ADMIN — ZOLPIDEM TARTRATE 5 MILLIGRAM(S): 10 TABLET ORAL at 00:13

## 2019-01-09 RX ADMIN — Medication 75 MICROGRAM(S): at 05:34

## 2019-01-09 RX ADMIN — Medication 125 MILLIGRAM(S): at 05:33

## 2019-01-09 RX ADMIN — Medication 125 MILLIGRAM(S): at 00:12

## 2019-01-09 RX ADMIN — Medication 1200 MILLIGRAM(S): at 13:09

## 2019-01-09 RX ADMIN — Medication 1200 MILLIGRAM(S): at 21:24

## 2019-01-09 RX ADMIN — Medication 20 MILLIEQUIVALENT(S): at 10:19

## 2019-01-09 RX ADMIN — Medication 200 MILLIGRAM(S): at 05:33

## 2019-01-09 RX ADMIN — Medication 125 MILLIGRAM(S): at 17:56

## 2019-01-09 RX ADMIN — ZOLPIDEM TARTRATE 5 MILLIGRAM(S): 10 TABLET ORAL at 23:39

## 2019-01-09 NOTE — CONSULT NOTE ADULT - PROBLEM SELECTOR RECOMMENDATION 9
likely secondary to C diff  repeat stool pcr  vanco q 6 hours  bacid tid  id eval called  diet as tolerated  questran qd

## 2019-01-10 LAB
ALBUMIN SERPL ELPH-MCNC: 2.2 G/DL — LOW (ref 3.3–5)
ALP SERPL-CCNC: 44 U/L — SIGNIFICANT CHANGE UP (ref 30–120)
ALT FLD-CCNC: 16 U/L DA — SIGNIFICANT CHANGE UP (ref 10–60)
ANION GAP SERPL CALC-SCNC: 9 MMOL/L — SIGNIFICANT CHANGE UP (ref 5–17)
AST SERPL-CCNC: 9 U/L — LOW (ref 10–40)
BILIRUB SERPL-MCNC: 0.3 MG/DL — SIGNIFICANT CHANGE UP (ref 0.2–1.2)
BUN SERPL-MCNC: 3 MG/DL — LOW (ref 7–23)
CALCIUM SERPL-MCNC: 8.5 MG/DL — SIGNIFICANT CHANGE UP (ref 8.4–10.5)
CHLORIDE SERPL-SCNC: 101 MMOL/L — SIGNIFICANT CHANGE UP (ref 96–108)
CLOSURE TME COLL+EPINEP BLD: 312 — SIGNIFICANT CHANGE UP
CO2 SERPL-SCNC: 28 MMOL/L — SIGNIFICANT CHANGE UP (ref 22–31)
CREAT SERPL-MCNC: 0.82 MG/DL — SIGNIFICANT CHANGE UP (ref 0.5–1.3)
GLUCOSE SERPL-MCNC: 90 MG/DL — SIGNIFICANT CHANGE UP (ref 70–99)
HCT VFR BLD CALC: 38 % — LOW (ref 39–50)
HGB BLD-MCNC: 12.1 G/DL — LOW (ref 13–17)
MAGNESIUM SERPL-MCNC: 1.8 MG/DL — SIGNIFICANT CHANGE UP (ref 1.6–2.6)
MCHC RBC-ENTMCNC: 27 PG — SIGNIFICANT CHANGE UP (ref 27–34)
MCHC RBC-ENTMCNC: 31.8 GM/DL — LOW (ref 32–36)
MCV RBC AUTO: 84.8 FL — SIGNIFICANT CHANGE UP (ref 80–100)
NRBC # BLD: 0 /100 WBCS — SIGNIFICANT CHANGE UP (ref 0–0)
PHOSPHATE SERPL-MCNC: 2.9 MG/DL — SIGNIFICANT CHANGE UP (ref 2.5–4.5)
PLATELET # BLD AUTO: SIGNIFICANT CHANGE UP K/UL (ref 150–400)
POTASSIUM SERPL-MCNC: 3.3 MMOL/L — LOW (ref 3.5–5.3)
POTASSIUM SERPL-SCNC: 3.3 MMOL/L — LOW (ref 3.5–5.3)
PROT SERPL-MCNC: 6.3 G/DL — SIGNIFICANT CHANGE UP (ref 6–8.3)
RBC # BLD: 4.48 M/UL — SIGNIFICANT CHANGE UP (ref 4.2–5.8)
RBC # FLD: 13.1 % — SIGNIFICANT CHANGE UP (ref 10.3–14.5)
SODIUM SERPL-SCNC: 138 MMOL/L — SIGNIFICANT CHANGE UP (ref 135–145)
WBC # BLD: 8.58 K/UL — SIGNIFICANT CHANGE UP (ref 3.8–10.5)
WBC # FLD AUTO: 8.58 K/UL — SIGNIFICANT CHANGE UP (ref 3.8–10.5)

## 2019-01-10 PROCEDURE — 99232 SBSQ HOSP IP/OBS MODERATE 35: CPT

## 2019-01-10 RX ORDER — MORPHINE SULFATE 50 MG/1
2 CAPSULE, EXTENDED RELEASE ORAL EVERY 4 HOURS
Qty: 0 | Refills: 0 | Status: DISCONTINUED | OUTPATIENT
Start: 2019-01-10 | End: 2019-01-13

## 2019-01-10 RX ORDER — HYOSCYAMINE SULFATE 0.13 MG
0.12 TABLET ORAL
Qty: 0 | Refills: 0 | Status: DISCONTINUED | OUTPATIENT
Start: 2019-01-10 | End: 2019-01-13

## 2019-01-10 RX ORDER — POTASSIUM CHLORIDE 20 MEQ
20 PACKET (EA) ORAL ONCE
Qty: 0 | Refills: 0 | Status: COMPLETED | OUTPATIENT
Start: 2019-01-10 | End: 2019-01-10

## 2019-01-10 RX ORDER — MESALAMINE 400 MG
1000 TABLET, DELAYED RELEASE (ENTERIC COATED) ORAL AT BEDTIME
Qty: 0 | Refills: 0 | Status: DISCONTINUED | OUTPATIENT
Start: 2019-01-10 | End: 2019-01-13

## 2019-01-10 RX ORDER — MAGNESIUM SULFATE 500 MG/ML
1 VIAL (ML) INJECTION ONCE
Qty: 0 | Refills: 0 | Status: COMPLETED | OUTPATIENT
Start: 2019-01-10 | End: 2019-01-10

## 2019-01-10 RX ADMIN — MORPHINE SULFATE 2 MILLIGRAM(S): 50 CAPSULE, EXTENDED RELEASE ORAL at 13:48

## 2019-01-10 RX ADMIN — Medication 1200 MILLIGRAM(S): at 14:54

## 2019-01-10 RX ADMIN — Medication 1200 MILLIGRAM(S): at 05:39

## 2019-01-10 RX ADMIN — CHOLESTYRAMINE 4 GRAM(S): 4 POWDER, FOR SUSPENSION ORAL at 09:24

## 2019-01-10 RX ADMIN — MORPHINE SULFATE 2 MILLIGRAM(S): 50 CAPSULE, EXTENDED RELEASE ORAL at 17:58

## 2019-01-10 RX ADMIN — Medication 1200 MILLIGRAM(S): at 21:17

## 2019-01-10 RX ADMIN — Medication 1000 MILLIGRAM(S): at 21:17

## 2019-01-10 RX ADMIN — Medication 20 MILLIEQUIVALENT(S): at 12:16

## 2019-01-10 RX ADMIN — Medication 100 GRAM(S): at 12:15

## 2019-01-10 RX ADMIN — Medication 75 MICROGRAM(S): at 05:39

## 2019-01-10 RX ADMIN — MORPHINE SULFATE 2 MILLIGRAM(S): 50 CAPSULE, EXTENDED RELEASE ORAL at 09:54

## 2019-01-10 RX ADMIN — MORPHINE SULFATE 2 MILLIGRAM(S): 50 CAPSULE, EXTENDED RELEASE ORAL at 13:46

## 2019-01-10 RX ADMIN — MORPHINE SULFATE 2 MILLIGRAM(S): 50 CAPSULE, EXTENDED RELEASE ORAL at 09:24

## 2019-01-11 ENCOUNTER — TRANSCRIPTION ENCOUNTER (OUTPATIENT)
Age: 74
End: 2019-01-11

## 2019-01-11 LAB
ANION GAP SERPL CALC-SCNC: 7 MMOL/L — SIGNIFICANT CHANGE UP (ref 5–17)
BUN SERPL-MCNC: 2 MG/DL — LOW (ref 7–23)
CALCIUM SERPL-MCNC: 8.6 MG/DL — SIGNIFICANT CHANGE UP (ref 8.4–10.5)
CHLORIDE SERPL-SCNC: 102 MMOL/L — SIGNIFICANT CHANGE UP (ref 96–108)
CO2 SERPL-SCNC: 31 MMOL/L — SIGNIFICANT CHANGE UP (ref 22–31)
CREAT SERPL-MCNC: 0.88 MG/DL — SIGNIFICANT CHANGE UP (ref 0.5–1.3)
GLUCOSE SERPL-MCNC: 87 MG/DL — SIGNIFICANT CHANGE UP (ref 70–99)
HCT VFR BLD CALC: 37.9 % — LOW (ref 39–50)
HGB BLD-MCNC: 12.3 G/DL — LOW (ref 13–17)
MAGNESIUM SERPL-MCNC: 2 MG/DL — SIGNIFICANT CHANGE UP (ref 1.6–2.6)
MCHC RBC-ENTMCNC: 27.4 PG — SIGNIFICANT CHANGE UP (ref 27–34)
MCHC RBC-ENTMCNC: 32.5 GM/DL — SIGNIFICANT CHANGE UP (ref 32–36)
MCV RBC AUTO: 84.4 FL — SIGNIFICANT CHANGE UP (ref 80–100)
NRBC # BLD: 0 /100 WBCS — SIGNIFICANT CHANGE UP (ref 0–0)
PHOSPHATE SERPL-MCNC: 3.1 MG/DL — SIGNIFICANT CHANGE UP (ref 2.5–4.5)
PLATELET # BLD AUTO: 111 K/UL — LOW (ref 150–400)
POTASSIUM SERPL-MCNC: 3.8 MMOL/L — SIGNIFICANT CHANGE UP (ref 3.5–5.3)
POTASSIUM SERPL-SCNC: 3.8 MMOL/L — SIGNIFICANT CHANGE UP (ref 3.5–5.3)
RBC # BLD: 4.49 M/UL — SIGNIFICANT CHANGE UP (ref 4.2–5.8)
RBC # FLD: 13 % — SIGNIFICANT CHANGE UP (ref 10.3–14.5)
SODIUM SERPL-SCNC: 140 MMOL/L — SIGNIFICANT CHANGE UP (ref 135–145)
WBC # BLD: 7.9 K/UL — SIGNIFICANT CHANGE UP (ref 3.8–10.5)
WBC # FLD AUTO: 7.9 K/UL — SIGNIFICANT CHANGE UP (ref 3.8–10.5)

## 2019-01-11 PROCEDURE — 99232 SBSQ HOSP IP/OBS MODERATE 35: CPT

## 2019-01-11 RX ADMIN — Medication 1200 MILLIGRAM(S): at 21:52

## 2019-01-11 RX ADMIN — Medication 1200 MILLIGRAM(S): at 15:01

## 2019-01-11 RX ADMIN — ZOLPIDEM TARTRATE 5 MILLIGRAM(S): 10 TABLET ORAL at 23:33

## 2019-01-11 RX ADMIN — Medication 1000 MILLIGRAM(S): at 21:52

## 2019-01-11 RX ADMIN — Medication 1200 MILLIGRAM(S): at 05:27

## 2019-01-11 RX ADMIN — SODIUM CHLORIDE 75 MILLILITER(S): 9 INJECTION, SOLUTION INTRAVENOUS at 05:27

## 2019-01-11 RX ADMIN — Medication 75 MICROGRAM(S): at 05:27

## 2019-01-11 RX ADMIN — CHOLESTYRAMINE 4 GRAM(S): 4 POWDER, FOR SUSPENSION ORAL at 08:10

## 2019-01-11 RX ADMIN — ZOLPIDEM TARTRATE 5 MILLIGRAM(S): 10 TABLET ORAL at 00:06

## 2019-01-11 RX ADMIN — SODIUM CHLORIDE 75 MILLILITER(S): 9 INJECTION, SOLUTION INTRAVENOUS at 21:53

## 2019-01-11 NOTE — DISCHARGE NOTE ADULT - CARE PLAN
Principal Discharge DX:	Colitis  Goal:	Continue with current medications.  Outpatient follow up with GI in one week  Assessment and plan of treatment:	regular diet  Secondary Diagnosis:	Hypothyroid  Goal:	Continue with home medications Principal Discharge DX:	Colitis  Goal:	Continue with current medications.  Outpatient follow up with GI in one week  Assessment and plan of treatment:	regular diet  To continue with mesalamine po/rectal suppository as per GI.   Pt to continue with cholestyramine on daily.  Secondary Diagnosis:	Hypothyroid  Goal:	Continue with home medications

## 2019-01-11 NOTE — DISCHARGE NOTE ADULT - CARE PROVIDER_API CALL
Jhonatan Deng (DO), Gastroenterology; Internal Medicine  [address] [place] [id number]  Phone: [phone]  Fax: [phone]    bairon montiel  Phone: (   )    -  Fax: (   )    -

## 2019-01-11 NOTE — DISCHARGE NOTE ADULT - HOSPITAL COURSE
73M with hypothyroidism and ulcerative colitis who presents with abdominal pain, found to have pancolitis, likely Cdiff colitis.      1.  Pancolitis.    -Abdm CT scan shows pancolitis.    -Suspect C-diff intially.  Vanco po was d/veena as per ID once C-diff was negative  -C-diff negative.  Stool culture negative.    -Will discuss case with GI  -GI to follow up     2.  Hypothyroidism, cont with synthroid.     3.  Hypokalemia/Low magnesium.  resolved 73M with hypothyroidism and ulcerative colitis who presents with abdominal pain, found to have pancolitis, likely Cdiff colitis.      1.  Pancolitis.    -Abdm CT scan shows pancolitis.    -Suspect C-diff intially.  Vanco po was d/veena as per ID once C-diff was negative  -C-diff negative.  Stool culture negative.    To continue with cholestyramine po daily.   -GI to follow up in 2-3 weeks.     2.  Hypothyroidism, cont with synthroid.     3.  Hypokalemia/Low magnesium.  resolved

## 2019-01-11 NOTE — DISCHARGE NOTE ADULT - PATIENT PORTAL LINK FT
You can access the [facility], offered by [facility], by registering with the following website: [email or web address]

## 2019-01-11 NOTE — DISCHARGE NOTE ADULT - PLAN OF CARE
Continue with current medications.  Outpatient follow up with GI in one week regular diet Continue with home medications regular diet  To continue with mesalamine po/rectal suppository as per GI.   Pt to continue with cholestyramine on daily.

## 2019-01-11 NOTE — DISCHARGE NOTE ADULT - ADDITIONAL INSTRUCTIONS
Please follow up with your doctor in one week  please follow up with gastroenterology in one to two weeks  Please come back to the Hospital if you develop any further abdominal pain, Nausea, vomiting, diarrhea, blood per stool or any new symptoms or concerns

## 2019-01-11 NOTE — DISCHARGE NOTE ADULT - MEDICATION SUMMARY - MEDICATIONS TO TAKE
I will START or STAY ON the medications listed below when I get home from the hospital:    mesalamine 400 mg oral delayed release capsule  -- 3 cap(s) by mouth 3 times a day  -- Indication: For Ulcerative chronic pancolitis without complications    mesalamine 1000 mg rectal suppository  -- 1 suppository(ies) rectally once a day (at bedtime)  -- Indication: For Ulcerative chronic pancolitis without complications    cholestyramine 4 g/9 g oral powder for reconstitution  -- 4 gram(s) by mouth once a day  -- Indication: For diarrhea    zolpidem 5 mg oral tablet  -- 1 tab(s) by mouth once a day (at bedtime), As needed, Insomnia  -- Indication: For insomnia    levothyroxine 75 mcg (0.075 mg) oral tablet  -- 1 tab(s) by mouth once a day  -- Indication: For Hypothyroid

## 2019-01-12 LAB
ALBUMIN SERPL ELPH-MCNC: 2.7 G/DL — LOW (ref 3.3–5)
ALP SERPL-CCNC: 46 U/L — SIGNIFICANT CHANGE UP (ref 30–120)
ALT FLD-CCNC: 20 U/L DA — SIGNIFICANT CHANGE UP (ref 10–60)
ANION GAP SERPL CALC-SCNC: 10 MMOL/L — SIGNIFICANT CHANGE UP (ref 5–17)
AST SERPL-CCNC: 18 U/L — SIGNIFICANT CHANGE UP (ref 10–40)
BILIRUB SERPL-MCNC: 0.3 MG/DL — SIGNIFICANT CHANGE UP (ref 0.2–1.2)
BUN SERPL-MCNC: 3 MG/DL — LOW (ref 7–23)
CALCIUM SERPL-MCNC: 8.8 MG/DL — SIGNIFICANT CHANGE UP (ref 8.4–10.5)
CHLORIDE SERPL-SCNC: 102 MMOL/L — SIGNIFICANT CHANGE UP (ref 96–108)
CO2 SERPL-SCNC: 27 MMOL/L — SIGNIFICANT CHANGE UP (ref 22–31)
CREAT SERPL-MCNC: 0.97 MG/DL — SIGNIFICANT CHANGE UP (ref 0.5–1.3)
GLUCOSE SERPL-MCNC: 112 MG/DL — HIGH (ref 70–99)
HCT VFR BLD CALC: 41 % — SIGNIFICANT CHANGE UP (ref 39–50)
HGB BLD-MCNC: 13.3 G/DL — SIGNIFICANT CHANGE UP (ref 13–17)
MAGNESIUM SERPL-MCNC: 1.7 MG/DL — SIGNIFICANT CHANGE UP (ref 1.6–2.6)
MCHC RBC-ENTMCNC: 27.2 PG — SIGNIFICANT CHANGE UP (ref 27–34)
MCHC RBC-ENTMCNC: 32.4 GM/DL — SIGNIFICANT CHANGE UP (ref 32–36)
MCV RBC AUTO: 83.8 FL — SIGNIFICANT CHANGE UP (ref 80–100)
NRBC # BLD: 0 /100 WBCS — SIGNIFICANT CHANGE UP (ref 0–0)
PHOSPHATE SERPL-MCNC: 3.2 MG/DL — SIGNIFICANT CHANGE UP (ref 2.5–4.5)
PLATELET # BLD AUTO: 129 K/UL — LOW (ref 150–400)
POTASSIUM SERPL-MCNC: 3.5 MMOL/L — SIGNIFICANT CHANGE UP (ref 3.5–5.3)
POTASSIUM SERPL-SCNC: 3.5 MMOL/L — SIGNIFICANT CHANGE UP (ref 3.5–5.3)
PROT SERPL-MCNC: 7.1 G/DL — SIGNIFICANT CHANGE UP (ref 6–8.3)
RBC # BLD: 4.89 M/UL — SIGNIFICANT CHANGE UP (ref 4.2–5.8)
RBC # FLD: 12.9 % — SIGNIFICANT CHANGE UP (ref 10.3–14.5)
SODIUM SERPL-SCNC: 139 MMOL/L — SIGNIFICANT CHANGE UP (ref 135–145)
WBC # BLD: 8.46 K/UL — SIGNIFICANT CHANGE UP (ref 3.8–10.5)
WBC # FLD AUTO: 8.46 K/UL — SIGNIFICANT CHANGE UP (ref 3.8–10.5)

## 2019-01-12 PROCEDURE — 99233 SBSQ HOSP IP/OBS HIGH 50: CPT

## 2019-01-12 RX ADMIN — ZOLPIDEM TARTRATE 5 MILLIGRAM(S): 10 TABLET ORAL at 23:01

## 2019-01-12 RX ADMIN — Medication 1000 MILLIGRAM(S): at 21:35

## 2019-01-12 RX ADMIN — Medication 75 MICROGRAM(S): at 05:42

## 2019-01-12 RX ADMIN — CHOLESTYRAMINE 4 GRAM(S): 4 POWDER, FOR SUSPENSION ORAL at 08:52

## 2019-01-12 RX ADMIN — Medication 1200 MILLIGRAM(S): at 05:42

## 2019-01-12 RX ADMIN — Medication 1200 MILLIGRAM(S): at 14:56

## 2019-01-12 RX ADMIN — SODIUM CHLORIDE 75 MILLILITER(S): 9 INJECTION, SOLUTION INTRAVENOUS at 07:23

## 2019-01-12 RX ADMIN — Medication 1200 MILLIGRAM(S): at 21:34

## 2019-01-13 VITALS
SYSTOLIC BLOOD PRESSURE: 109 MMHG | OXYGEN SATURATION: 96 % | TEMPERATURE: 98 F | HEART RATE: 91 BPM | DIASTOLIC BLOOD PRESSURE: 64 MMHG | RESPIRATION RATE: 16 BRPM

## 2019-01-13 LAB
ANION GAP SERPL CALC-SCNC: 9 MMOL/L — SIGNIFICANT CHANGE UP (ref 5–17)
BUN SERPL-MCNC: 4 MG/DL — LOW (ref 7–23)
CALCIUM SERPL-MCNC: 9.1 MG/DL — SIGNIFICANT CHANGE UP (ref 8.4–10.5)
CHLORIDE SERPL-SCNC: 101 MMOL/L — SIGNIFICANT CHANGE UP (ref 96–108)
CO2 SERPL-SCNC: 28 MMOL/L — SIGNIFICANT CHANGE UP (ref 22–31)
CREAT SERPL-MCNC: 1.06 MG/DL — SIGNIFICANT CHANGE UP (ref 0.5–1.3)
CRP SERPL-MCNC: 1.65 MG/DL — HIGH (ref 0–0.4)
GLUCOSE SERPL-MCNC: 123 MG/DL — HIGH (ref 70–99)
HCT VFR BLD CALC: 42.4 % — SIGNIFICANT CHANGE UP (ref 39–50)
HGB BLD-MCNC: 13.6 G/DL — SIGNIFICANT CHANGE UP (ref 13–17)
MCHC RBC-ENTMCNC: 27.2 PG — SIGNIFICANT CHANGE UP (ref 27–34)
MCHC RBC-ENTMCNC: 32.1 GM/DL — SIGNIFICANT CHANGE UP (ref 32–36)
MCV RBC AUTO: 84.8 FL — SIGNIFICANT CHANGE UP (ref 80–100)
NRBC # BLD: 0 /100 WBCS — SIGNIFICANT CHANGE UP (ref 0–0)
PLATELET # BLD AUTO: 152 K/UL — SIGNIFICANT CHANGE UP (ref 150–400)
POTASSIUM SERPL-MCNC: 3.6 MMOL/L — SIGNIFICANT CHANGE UP (ref 3.5–5.3)
POTASSIUM SERPL-SCNC: 3.6 MMOL/L — SIGNIFICANT CHANGE UP (ref 3.5–5.3)
RBC # BLD: 5 M/UL — SIGNIFICANT CHANGE UP (ref 4.2–5.8)
RBC # FLD: 13.1 % — SIGNIFICANT CHANGE UP (ref 10.3–14.5)
SODIUM SERPL-SCNC: 138 MMOL/L — SIGNIFICANT CHANGE UP (ref 135–145)
WBC # BLD: 7.93 K/UL — SIGNIFICANT CHANGE UP (ref 3.8–10.5)
WBC # FLD AUTO: 7.93 K/UL — SIGNIFICANT CHANGE UP (ref 3.8–10.5)

## 2019-01-13 PROCEDURE — 85027 COMPLETE CBC AUTOMATED: CPT

## 2019-01-13 PROCEDURE — 83690 ASSAY OF LIPASE: CPT

## 2019-01-13 PROCEDURE — 86140 C-REACTIVE PROTEIN: CPT

## 2019-01-13 PROCEDURE — 36415 COLL VENOUS BLD VENIPUNCTURE: CPT

## 2019-01-13 PROCEDURE — 84100 ASSAY OF PHOSPHORUS: CPT

## 2019-01-13 PROCEDURE — 87493 C DIFF AMPLIFIED PROBE: CPT

## 2019-01-13 PROCEDURE — 74177 CT ABD & PELVIS W/CONTRAST: CPT

## 2019-01-13 PROCEDURE — 96360 HYDRATION IV INFUSION INIT: CPT

## 2019-01-13 PROCEDURE — 93005 ELECTROCARDIOGRAM TRACING: CPT

## 2019-01-13 PROCEDURE — 99239 HOSP IP/OBS DSCHRG MGMT >30: CPT

## 2019-01-13 PROCEDURE — 81001 URINALYSIS AUTO W/SCOPE: CPT

## 2019-01-13 PROCEDURE — 87507 IADNA-DNA/RNA PROBE TQ 12-25: CPT

## 2019-01-13 PROCEDURE — 99285 EMERGENCY DEPT VISIT HI MDM: CPT | Mod: 25

## 2019-01-13 PROCEDURE — 84443 ASSAY THYROID STIM HORMONE: CPT

## 2019-01-13 PROCEDURE — 80053 COMPREHEN METABOLIC PANEL: CPT

## 2019-01-13 PROCEDURE — 80048 BASIC METABOLIC PNL TOTAL CA: CPT

## 2019-01-13 PROCEDURE — 83735 ASSAY OF MAGNESIUM: CPT

## 2019-01-13 PROCEDURE — 85610 PROTHROMBIN TIME: CPT

## 2019-01-13 PROCEDURE — 85730 THROMBOPLASTIN TIME PARTIAL: CPT

## 2019-01-13 RX ORDER — MESALAMINE 400 MG
3 TABLET, DELAYED RELEASE (ENTERIC COATED) ORAL
Qty: 0 | Refills: 0 | DISCHARGE
Start: 2019-01-13

## 2019-01-13 RX ORDER — CHOLESTYRAMINE 4 G/9G
4 POWDER, FOR SUSPENSION ORAL
Qty: 30 | Refills: 0
Start: 2019-01-13 | End: 2019-02-11

## 2019-01-13 RX ORDER — LEVOTHYROXINE SODIUM 125 MCG
1 TABLET ORAL
Qty: 0 | Refills: 0 | COMMUNITY

## 2019-01-13 RX ORDER — ZOLPIDEM TARTRATE 10 MG/1
1 TABLET ORAL
Qty: 0 | Refills: 0 | DISCHARGE
Start: 2019-01-13

## 2019-01-13 RX ORDER — ZOLPIDEM TARTRATE 10 MG/1
1 TABLET ORAL
Qty: 0 | Refills: 0 | COMMUNITY

## 2019-01-13 RX ORDER — VANCOMYCIN HCL 1 G
1 VIAL (EA) INTRAVENOUS
Qty: 0 | Refills: 0 | COMMUNITY

## 2019-01-13 RX ORDER — LEVOTHYROXINE SODIUM 125 MCG
1 TABLET ORAL
Qty: 0 | Refills: 0 | DISCHARGE
Start: 2019-01-13

## 2019-01-13 RX ORDER — MESALAMINE 400 MG
1 TABLET, DELAYED RELEASE (ENTERIC COATED) ORAL
Qty: 0 | Refills: 0 | DISCHARGE
Start: 2019-01-13

## 2019-01-13 RX ADMIN — CHOLESTYRAMINE 4 GRAM(S): 4 POWDER, FOR SUSPENSION ORAL at 10:06

## 2019-01-13 RX ADMIN — Medication 1200 MILLIGRAM(S): at 05:53

## 2019-01-13 RX ADMIN — Medication 75 MICROGRAM(S): at 05:54

## 2019-01-13 NOTE — PROGRESS NOTE ADULT - SUBJECTIVE AND OBJECTIVE BOX
ABUNDIO CALDERÓN is a 73yMale , patient examined and chart reviewed.     INTERVAL HPI/ OVERNIGHT EVENTS:   Denies abd pain.  Tolerating diet. Diarrhea better.    PAST MEDICAL & SURGICAL HISTORY:  Colitis  Hypothyroid  History of left hip replacement      For details regarding the patient's social history, family history, and other miscellaneous elements, please refer the initial infectious diseases consultation and/or the admitting history and physical examination for this admission.    ROS:  CONSTITUTIONAL:  Negative fever or chills,  EYES:  Negative  blurry vision or double vision  CARDIOVASCULAR:  Negative for chest pain or palpitations  RESPIRATORY:  Negative for cough, wheezing, or SOB   GASTROINTESTINAL:  Negative for nausea, vomiting, constipation, + abdominal pain diarrhea,  GENITOURINARY:  Negative frequency, urgency or dysuria  NEUROLOGIC:  No headache, confusion, dizziness, lightheadedness  All other systems were reviewed and are negative     ALLERGIES:  penicillin (Swelling)      Current inpatient medications :    ANTIBIOTICS/RELEVANT:  NONE    MEDICATIONS  (STANDING):  cholestyramine Powder (Sugar-Free) 4 Gram(s) Oral daily  lactated ringers. 1000 milliLiter(s) (75 mL/Hr) IV Continuous <Continuous>  levothyroxine 75 MICROGram(s) Oral daily  mesalamine DR Capsule 1200 milliGRAM(s) Oral three times a day  mesalamine Suppository 1000 milliGRAM(s) Rectal at bedtime    MEDICATIONS  (PRN):  hyoscyamine SL 0.125 milliGRAM(s) SubLingual four times a day PRN cramps  morphine  - Injectable 2 milliGRAM(s) IV Push every 4 hours PRN Severe Pain (7 - 10)  zolpidem 5 milliGRAM(s) Oral at bedtime PRN Insomnia    Objective:  Vital Signs Last 24 Hrs  T(C): 37.2 (12 Jan 2019 21:11), Max: 37.2 (12 Jan 2019 21:11)  T(F): 98.9 (12 Jan 2019 21:11), Max: 98.9 (12 Jan 2019 21:11)  HR: 85 (12 Jan 2019 21:11) (72 - 90)  BP: 132/79 (12 Jan 2019 21:11) (118/77 - 156/83)  RR: 18 (12 Jan 2019 21:11) (16 - 18)  SpO2: 95% (12 Jan 2019 21:11) (93% - 97%)    Physical Exam:  General: in no acute distress  Eyes: sclera anicteric, pupils equal and reactive to light  ENMT: buccal mucosa moist, pharynx not injected  Neck: supple, trachea midline  Lungs: clear, no wheeze/rhonchi  Cardiovascular: regular rate and rhythm, S1 S2  Abdomen: soft, lower abd non tender, bowel sounds normal  Neurological: alert and oriented x3, Cranial Nerves II-XII grossly intact  Skin: no increased ecchymosis/petechiae/purpura  Lymph Nodes: no palpable cervical/supraclavicular lymph nodes enlargements  Extremities: no cyanosis/clubbing/edema    LABS:                        13.3   8.46  )-----------( 129      ( 12 Jan 2019 07:09 )             41.0   01-12    139  |  102  |  3<L>  ----------------------------<  112<H>  3.5   |  27  |  0.97    Ca    8.8      12 Jan 2019 07:13  Phos  3.2     01-12  Mg     1.7     01-12    TPro  7.1  /  Alb  2.7<L>  /  TBili  0.3  /  DBili  x   /  AST  18  /  ALT  20  /  AlkPhos  46  01-12    MICROBIOLOGY:    GI PCR Panel, Stool (collected 09 Jan 2019 21:12)  Source: .Stool Feces  Final Report (09 Jan 2019 23:09):    GI PCR Results: NOT detected    *******Please Note:*******    GI panel PCR evaluates for:    Campylobacter, Plesiomonas shigelloides, Salmonella,    Vibrio, Yersinia enterocolitica, Enteroaggregative    Escherichia coli (EAEC), Enteropathogenic E.coli (EPEC),    Enterotoxigenic E. coli (ETEC) lt/st, Shiga-like    toxin-producing E. coli (STEC) stx1/stx2,    Shigella/ Enteroinvasive E. coli (EIEC), Cryptosporidium,    Cyclospora cayetanensis, Entamoeba histolytica,    Giardia lamblia, Adenovirus F 40/41, Astrovirus,    Norovirus GI/GII, Rotavirus A, Sapovirus      Clostridium difficile Toxin by PCR (01.09.19 @ 10:37)    C Diff by PCR Result: NotDetec    RADIOLOGY & ADDITIONAL STUDIES:  EXAM:  CT ABDOMEN AND PELVIS OC IC                            PROCEDURE DATE:  01/08/2019      INTERPRETATION:  .    CLINICAL INFORMATION: Left lower quadrant abdominal pain.    TECHNIQUE: Helical axial images were obtained from the domesof the   diaphragm through the pubic symphysis. 95 mls of Omnipaque-350  was   administered intravenously without complication and 5 mls were discarded.   Oral contrast was also administered. Coronal and Sagittal reconstructions   were obtained from the source data.     COMPARISON: Prior CT examination of the abdomen and pelvis from   10/23/2018.     FINDINGS: There are atherosclerotic calcifications of the imaged coronary   arteries, aorta, and branch vessels. The imaged portions of the aorta are   normal in caliber. The lung bases are unremarkable.    There is hepatic steatosis. The gallbladder, biliary tree, pancreas,   spleen, and adrenal glands appear unremarkable.    There is a tiny low-attenuation focus in the left kidney which appears  unchanged. A right renal cyst appears unchanged off the lower pole. There   is no hydroureteronephrosis bilaterally. The urinary bladder appears   unremarkable.    There are multiple scattered nonspecific subcentimeter retroperitoneal   and mesenteric lymph nodes.    There is no bowel obstruction or abdominal ascites. There is diffuse   pancolonic wall thickening with adjacent fat stranding. There is also   involvement of the rectum. Colonic diverticulosis is noted. The appendix   is unremarkable.    A punctate calcification is noted within the left-sided the prostate   gland. There are moderate-sized bilateral fat containing inguinal hernias.    Multilevel degenerative changes are noted within the imaged potions of   the spine. A left-sided total hip arthroplasty is noted.    IMPRESSION: Pancolitis.    Assessment :   73M with hypothyroidism and UC recent Cdiff admitted with abdominal pain and diarrhea  found to have pancolitis  Has not been on systemic antibiotics but was on steroids.  Likely UC flare  Cdiff and GI PCR neg  Overall improving    Plan :   Monitor off antibiotics  Stable from ID standpoint    Continue with present regiment.  Appropriate use of antibiotics and adverse effects reviewed.    I have discussed the above plan of care with patient/ family in detail. They expressed understanding of the  treatment plan . Risks, benefits and alternatives discussed in detail. I have asked if they have any questions or concerns and appropriately addressed them to the best of my ability .    > 25 minutes were spent in direct patient care reviewing notes, medications ,labs data/ imaging , discussion with multidisciplinary team.    Thank you for allowing me to participate in care of your patient .    Nicholas Hernandez MD  Infectious Disease  [phone]
CC.  Abdm Pain/Diarrhea  HPI.  Patient reports abdm pain improving.  Diarrhea slowing down.  afebrile.  Feels nauseated at time.  Offers no other complaints              Constitutional: No fever, fatigue or weight loss.  Skin: No rash.  Eyes: No recent vision problems or eye pain.  ENT: No congestion, ear pain, or sore throat.  Endocrine: No thyroid problems.  Cardiovascular: No chest pain or palpation.  Respiratory: No cough, shortness of breath, congestion, or wheezing.  Gastrointestinal: see above  Genitourinary: No dysuria.  Musculoskeletal: No joint swelling.  Neurologic: No headache.      Vital Signs Last 24 Hrs  T(C): 36.5 (19 @ 09:21), Max: 36.8 (19 @ 05:48)  T(F): 97.7 (19 @ 09:21), Max: 98.3 (19 @ 05:48)  HR: 98 (19 @ 09:21) (98 - 112)  BP: 117/71 (19 @ 09:21) (117/71 - 153/90)  BP(mean): --  RR: 18 (19 @ 09:21) (16 - 20)  SpO2: 91% (19 @ 05:48) (91% - 97%)        PHYSICAL EXAM-  GENERAL: NAD, well-groomed, well-developed  HEAD:  Atraumatic, Normocephalic  EYES: EOMI, PERRLA, conjunctiva and sclera clear  NECK: Supple, No JVD, Normal thyroid  NERVOUS SYSTEM:  Alert & Oriented X3, Motor Strength 5/5 B/L upper and lower extremities; DTRs 2+ intact and symmetric  CHEST/LUNG: Clear to percussion bilaterally; No rales, rhonchi, wheezing, or rubs  HEART: Regular rate and rhythm; No murmurs, rubs, or gallops  ABDOMEN: Soft, diffuse abdm tenderness.  Hyperactive BS.  No rebound or guarding  EXTREMITIES:    No clubbing, cyanosis, or edema  SKIN: No rashes or lesions                                  11.8   11.35 )-----------( 110      ( 2019 08:02 )             36.8         136  |  100  |  5<L>  ----------------------------<  100<H>  3.4<L>   |  24  |  0.92    Ca    8.2<L>      2019 08:02  Phos  3.3       Mg     1.4         TPro  6.2  /  Alb  2.3<L>  /  TBili  0.4  /  DBili  x   /  AST  13  /  ALT  15  /  AlkPhos  44            Urinalysis Basic - ( 2019 21:55 )    Color: Yellow / Appearance: Clear / S.005 / pH: x  Gluc: x / Ketone: Moderate  / Bili: Negative / Urobili: Negative mg/dL   Blood: x / Protein: 15 mg/dL / Nitrite: Negative   Leuk Esterase: Negative / RBC: x / WBC x   Sq Epi: x / Non Sq Epi: x / Bacteria: Occasional      PT/INR - ( 2019 17:14 )   PT: 14.7 sec;   INR: 1.34 ratio         PTT - ( 2019 17:14 )  PTT:34.9 sec        MEDICATIONS  (STANDING):  lactated ringers. 1000 milliLiter(s) (75 mL/Hr) IV Continuous <Continuous>  levothyroxine 75 MICROGram(s) Oral daily  magnesium sulfate  IVPB 1 Gram(s) IV Intermittent once  potassium chloride    Tablet ER 20 milliEquivalent(s) Oral once  vancomycin    Solution 125 milliGRAM(s) Oral every 6 hours    MEDICATIONS  (PRN):  zolpidem 5 milliGRAM(s) Oral at bedtime PRN Insomnia        Imaging Personally Reviewed:     [x ] YES  [ ] NO    Consultant(s) Notes Reviewed:  [x ] YES  [ ] NO    Care Discussed with Consultants/Other Providers [x ] YES  [ ] No medical contraindication for discharge
ABUNDIO CALDERÓN is a 73yMale , patient examined and chart reviewed.     INTERVAL HPI/ OVERNIGHT EVENTS:   Still with lower abdominal pain.   Cdiff neg. Diarrhea better.    PAST MEDICAL & SURGICAL HISTORY:  Colitis  Hypothyroid  History of left hip replacement      For details regarding the patient's social history, family history, and other miscellaneous elements, please refer the initial infectious diseases consultation and/or the admitting history and physical examination for this admission.    ROS:  CONSTITUTIONAL:  Negative fever or chills,  EYES:  Negative  blurry vision or double vision  CARDIOVASCULAR:  Negative for chest pain or palpitations  RESPIRATORY:  Negative for cough, wheezing, or SOB   GASTROINTESTINAL:  Negative for nausea, vomiting, constipation, + abdominal pain diarrhea,  GENITOURINARY:  Negative frequency, urgency or dysuria  NEUROLOGIC:  No headache, confusion, dizziness, lightheadedness  All other systems were reviewed and are negative     ALLERGIES:  penicillin (Swelling)      Current inpatient medications :    ANTIBIOTICS/RELEVANT:  NONE    cholestyramine Powder (Sugar-Free) 4 Gram(s) Oral daily  hyoscyamine SL 0.125 milliGRAM(s) SubLingual four times a day PRN  lactated ringers. 1000 milliLiter(s) IV Continuous <Continuous>  levothyroxine 75 MICROGram(s) Oral daily  mesalamine DR Capsule 1200 milliGRAM(s) Oral three times a day  mesalamine Suppository 1000 milliGRAM(s) Rectal at bedtime  morphine  - Injectable 2 milliGRAM(s) IV Push every 4 hours PRN  zolpidem 5 milliGRAM(s) Oral at bedtime PRN      Objective:     @ 07:  -  01-10 @ 07:00  --------------------------------------------------------  IN: 1000 mL / OUT: 0 mL / NET: 1000 mL      T(C): 36.7 (01-10-19 @ 14:10), Max: 36.8 (19 @ 21:59)  HR: 93 (01-10-19 @ 14:10) (87 - 99)  BP: 133/66 (01-10-19 @ 14:10) (125/74 - 138/82)  RR: 18 (01-10-19 @ 14:10) (17 - 19)  SpO2: 96% (01-10-19 @ 14:10) (93% - 97%)  Wt(kg): --      Physical Exam:  General: well developed well nourished, in no acute distress  Eyes: sclera anicteric, pupils equal and reactive to light  ENMT: buccal mucosa moist, pharynx not injected  Neck: supple, trachea midline  Lungs: clear, no wheeze/rhonchi  Cardiovascular: regular rate and rhythm, S1 S2  Abdomen: soft, lower abd tender, bowel sounds normal  Neurological: alert and oriented x3, Cranial Nerves II-XII grossly intact  Skin: no increased ecchymosis/petechiae/purpura  Lymph Nodes: no palpable cervical/supraclavicular lymph nodes enlargements  Extremities: no cyanosis/clubbing/edema    LABS:                          12.1   8.58  )-----------( clumps    ( 10 Cullen 2019 07:52 )             38.0       01-10    138  |  101  |  3<L>  ----------------------------<  90  3.3<L>   |  28  |  0.82    Ca    8.5      10 Cullen 2019 07:52  Phos  2.9     -10  Mg     1.8     -10    TPro  6.3  /  Alb  2.2<L>  /  TBili  0.3  /  DBili  x   /  AST  9<L>  /  ALT  16  /  AlkPhos  44  01-10      PT/INR - ( 2019 17:14 )   PT: 14.7 sec;   INR: 1.34 ratio         PTT - ( 2019 17:14 )  PTT:34.9 sec  Urinalysis Basic - ( 2019 21:55 )    Color: Yellow / Appearance: Clear / S.005 / pH: x  Gluc: x / Ketone: Moderate  / Bili: Negative / Urobili: Negative mg/dL   Blood: x / Protein: 15 mg/dL / Nitrite: Negative   Leuk Esterase: Negative / RBC: x / WBC x   Sq Epi: x / Non Sq Epi: x / Bacteria: Occasional      MICROBIOLOGY:    GI PCR Panel, Stool (collected 2019 21:12)  Source: .Stool Feces  Final Report (2019 23:09):    GI PCR Results: NOT detected    *******Please Note:*******    GI panel PCR evaluates for:    Campylobacter, Plesiomonas shigelloides, Salmonella,    Vibrio, Yersinia enterocolitica, Enteroaggregative    Escherichia coli (EAEC), Enteropathogenic E.coli (EPEC),    Enterotoxigenic E. coli (ETEC) lt/st, Shiga-like    toxin-producing E. coli (STEC) stx1/stx2,    Shigella/ Enteroinvasive E. coli (EIEC), Cryptosporidium,    Cyclospora cayetanensis, Entamoeba histolytica,    Giardia lamblia, Adenovirus F 40/41, Astrovirus,    Norovirus GI/GII, Rotavirus A, Sapovirus      Clostridium difficile Toxin by PCR (19 @ 10:37)    C Diff by PCR Result: NotDetec    RADIOLOGY & ADDITIONAL STUDIES:  EXAM:  CT ABDOMEN AND PELVIS OC IC                            PROCEDURE DATE:  2019      INTERPRETATION:  .    CLINICAL INFORMATION: Left lower quadrant abdominal pain.    TECHNIQUE: Helical axial images were obtained from the domesof the   diaphragm through the pubic symphysis. 95 mls of Omnipaque-350  was   administered intravenously without complication and 5 mls were discarded.   Oral contrast was also administered. Coronal and Sagittal reconstructions   were obtained from the source data.     COMPARISON: Prior CT examination of the abdomen and pelvis from   10/23/2018.     FINDINGS: There are atherosclerotic calcifications of the imaged coronary   arteries, aorta, and branch vessels. The imaged portions of the aorta are   normal in caliber. The lung bases are unremarkable.    There is hepatic steatosis. The gallbladder, biliary tree, pancreas,   spleen, and adrenal glands appear unremarkable.    There is a tiny low-attenuation focus in the left kidney which appears  unchanged. A right renal cyst appears unchanged off the lower pole. There   is no hydroureteronephrosis bilaterally. The urinary bladder appears   unremarkable.    There are multiple scattered nonspecific subcentimeter retroperitoneal   and mesenteric lymph nodes.    There is no bowel obstruction or abdominal ascites. There is diffuse   pancolonic wall thickening with adjacent fat stranding. There is also   involvement of the rectum. Colonic diverticulosis is noted. The appendix   is unremarkable.    A punctate calcification is noted within the left-sided the prostate   gland. There are moderate-sized bilateral fat containing inguinal hernias.    Multilevel degenerative changes are noted within the imaged potions of   the spine. A left-sided total hip arthroplasty is noted.    IMPRESSION: Pancolitis.    Assessment :   73M with hypothyroidism and UC recent Cdiff admitted with abdominal pain and diarrhea  found to have pancolitis  Has not been on systemic antibiotics but was on steroids.  Likely  UC flare  Cdiff and GI PCR neg    Plan :   Monitor off antibiotics  GI following  Diet per GI  Serial Abdominal exams  IVF  Stable from ID standpoint    Continue with present regiment.  Appropriate use of antibiotics and adverse effects reviewed.      I have discussed the above plan of care with patient/ family in detail. They expressed understanding of the  treatment plan . Risks, benefits and alternatives discussed in detail. I have asked if they have any questions or concerns and appropriately addressed them to the best of my ability .    > 35 minutes were spent in direct patient care reviewing notes, medications ,labs data/ imaging , discussion with multidisciplinary team.    Thank you for allowing me to participate in care of your patient .    Nicholas Hernandez MD  Infectious Disease  [phone]
CALDERÓNABUNDIO SLOAN is a 73yMale , patient examined and chart reviewed.     INTERVAL HPI/ OVERNIGHT EVENTS:   Lower abdominal pain improving  Tolerating diet. Diarrhea better.    PAST MEDICAL & SURGICAL HISTORY:  Colitis  Hypothyroid  History of left hip replacement      For details regarding the patient's social history, family history, and other miscellaneous elements, please refer the initial infectious diseases consultation and/or the admitting history and physical examination for this admission.    ROS:  CONSTITUTIONAL:  Negative fever or chills,  EYES:  Negative  blurry vision or double vision  CARDIOVASCULAR:  Negative for chest pain or palpitations  RESPIRATORY:  Negative for cough, wheezing, or SOB   GASTROINTESTINAL:  Negative for nausea, vomiting, constipation, + abdominal pain diarrhea,  GENITOURINARY:  Negative frequency, urgency or dysuria  NEUROLOGIC:  No headache, confusion, dizziness, lightheadedness  All other systems were reviewed and are negative     ALLERGIES:  penicillin (Swelling)      Current inpatient medications :    ANTIBIOTICS/RELEVANT:  NONE    MEDICATIONS  (STANDING):  cholestyramine Powder (Sugar-Free) 4 Gram(s) Oral daily  lactated ringers. 1000 milliLiter(s) (75 mL/Hr) IV Continuous <Continuous>  levothyroxine 75 MICROGram(s) Oral daily  mesalamine DR Capsule 1200 milliGRAM(s) Oral three times a day  mesalamine Suppository 1000 milliGRAM(s) Rectal at bedtime    MEDICATIONS  (PRN):  hyoscyamine SL 0.125 milliGRAM(s) SubLingual four times a day PRN cramps  morphine  - Injectable 2 milliGRAM(s) IV Push every 4 hours PRN Severe Pain (7 - 10)  zolpidem 5 milliGRAM(s) Oral at bedtime PRN Insomnia    Objective:  Vital Signs Last 24 Hrs  T(C): 36.4 (11 Jan 2019 14:43), Max: 36.7 (11 Jan 2019 00:01)  T(F): 97.6 (11 Jan 2019 14:43), Max: 98.1 (11 Jan 2019 00:01)  HR: 91 (11 Jan 2019 14:43) (88 - 93)  BP: 124/74 (11 Jan 2019 14:43) (124/74 - 147/80)  RR: 18 (11 Jan 2019 14:43) (18 - 20)  SpO2: 96% (11 Jan 2019 14:43) (93% - 97%)    Physical Exam:  General: in no acute distress  Eyes: sclera anicteric, pupils equal and reactive to light  ENMT: buccal mucosa moist, pharynx not injected  Neck: supple, trachea midline  Lungs: clear, no wheeze/rhonchi  Cardiovascular: regular rate and rhythm, S1 S2  Abdomen: soft, lower abd non tender, bowel sounds normal  Neurological: alert and oriented x3, Cranial Nerves II-XII grossly intact  Skin: no increased ecchymosis/petechiae/purpura  Lymph Nodes: no palpable cervical/supraclavicular lymph nodes enlargements  Extremities: no cyanosis/clubbing/edema    LABS:                        12.3   7.90  )-----------( 111      ( 11 Jan 2019 08:13 )             37.9   01-11    140  |  102  |  2<L>  ----------------------------<  87  3.8   |  31  |  0.88    Ca    8.6      11 Jan 2019 08:13  Phos  3.1     01-11  Mg     2.0     01-11    TPro  6.3  /  Alb  2.2<L>  /  TBili  0.3  /  DBili  x   /  AST  9<L>  /  ALT  16  /  AlkPhos  44  01-10    MICROBIOLOGY:    GI PCR Panel, Stool (collected 09 Jan 2019 21:12)  Source: .Stool Feces  Final Report (09 Jan 2019 23:09):    GI PCR Results: NOT detected    *******Please Note:*******    GI panel PCR evaluates for:    Campylobacter, Plesiomonas shigelloides, Salmonella,    Vibrio, Yersinia enterocolitica, Enteroaggregative    Escherichia coli (EAEC), Enteropathogenic E.coli (EPEC),    Enterotoxigenic E. coli (ETEC) lt/st, Shiga-like    toxin-producing E. coli (STEC) stx1/stx2,    Shigella/ Enteroinvasive E. coli (EIEC), Cryptosporidium,    Cyclospora cayetanensis, Entamoeba histolytica,    Giardia lamblia, Adenovirus F 40/41, Astrovirus,    Norovirus GI/GII, Rotavirus A, Sapovirus      Clostridium difficile Toxin by PCR (01.09.19 @ 10:37)    C Diff by PCR Result: NotDetec    RADIOLOGY & ADDITIONAL STUDIES:  EXAM:  CT ABDOMEN AND PELVIS OC IC                            PROCEDURE DATE:  01/08/2019      INTERPRETATION:  .    CLINICAL INFORMATION: Left lower quadrant abdominal pain.    TECHNIQUE: Helical axial images were obtained from the domesof the   diaphragm through the pubic symphysis. 95 mls of Omnipaque-350  was   administered intravenously without complication and 5 mls were discarded.   Oral contrast was also administered. Coronal and Sagittal reconstructions   were obtained from the source data.     COMPARISON: Prior CT examination of the abdomen and pelvis from   10/23/2018.     FINDINGS: There are atherosclerotic calcifications of the imaged coronary   arteries, aorta, and branch vessels. The imaged portions of the aorta are   normal in caliber. The lung bases are unremarkable.    There is hepatic steatosis. The gallbladder, biliary tree, pancreas,   spleen, and adrenal glands appear unremarkable.    There is a tiny low-attenuation focus in the left kidney which appears  unchanged. A right renal cyst appears unchanged off the lower pole. There   is no hydroureteronephrosis bilaterally. The urinary bladder appears   unremarkable.    There are multiple scattered nonspecific subcentimeter retroperitoneal   and mesenteric lymph nodes.    There is no bowel obstruction or abdominal ascites. There is diffuse   pancolonic wall thickening with adjacent fat stranding. There is also   involvement of the rectum. Colonic diverticulosis is noted. The appendix   is unremarkable.    A punctate calcification is noted within the left-sided the prostate   gland. There are moderate-sized bilateral fat containing inguinal hernias.    Multilevel degenerative changes are noted within the imaged potions of   the spine. A left-sided total hip arthroplasty is noted.    IMPRESSION: Pancolitis.    Assessment :   73M with hypothyroidism and UC recent Cdiff admitted with abdominal pain and diarrhea  found to have pancolitis  Has not been on systemic antibiotics but was on steroids.  Likely UC flare  Cdiff and GI PCR neg  Overall improving    Plan :   Monitor off antibiotics  GI following  Diet per GI  Serial Abdominal exams  Stable from ID standpoint    Continue with present regiment.  Appropriate use of antibiotics and adverse effects reviewed.    I have discussed the above plan of care with patient/ family in detail. They expressed understanding of the  treatment plan . Risks, benefits and alternatives discussed in detail. I have asked if they have any questions or concerns and appropriately addressed them to the best of my ability .    > 35 minutes were spent in direct patient care reviewing notes, medications ,labs data/ imaging , discussion with multidisciplinary team.    Thank you for allowing me to participate in care of your patient .    Nicholas Hernandez MD  Infectious Disease  [phone]
CC.  Abdm Pain/Diarrhea  HPI.  Patient reports abdm pain is improving today.  2 episode of loose stool.  Tolerating clears well.  Min blood per stool.  Offers no other complaints              Constitutional: No fever, fatigue or weight loss.  Skin: No rash.  Eyes: No recent vision problems or eye pain.  ENT: No congestion, ear pain, or sore throat.  Endocrine: No thyroid problems.  Cardiovascular: No chest pain or palpation.  Respiratory: No cough, shortness of breath, congestion, or wheezing.  Gastrointestinal: see above  Genitourinary: No dysuria.  Musculoskeletal: No joint swelling.  Neurologic: No headache.      Vital Signs Last 24 Hrs  T(C): 36.4 (11 Jan 2019 10:15), Max: 36.7 (10 Cullen 2019 14:10)  T(F): 97.6 (11 Jan 2019 10:15), Max: 98.1 (10 Cullen 2019 14:10)  HR: 90 (11 Jan 2019 10:15) (88 - 103)  BP: 142/89 (11 Jan 2019 10:15) (133/66 - 147/80)  BP(mean): --  RR: 20 (11 Jan 2019 10:15) (18 - 20)  SpO2: 94% (11 Jan 2019 10:15) (93% - 97%)        PHYSICAL EXAM-  GENERAL: NAD, well-groomed, well-developed  HEAD:  Atraumatic, Normocephalic  EYES: EOMI, PERRLA, conjunctiva and sclera clear  NECK: Supple, No JVD, Normal thyroid  NERVOUS SYSTEM:  Alert & Oriented X3, Motor Strength 5/5 B/L upper and lower extremities; DTRs 2+ intact and symmetric  CHEST/LUNG: Clear to percussion bilaterally; No rales, rhonchi, wheezing, or rubs  HEART: Regular rate and rhythm; No murmurs, rubs, or gallops  ABDOMEN: Soft, Mild diffuse abdm tenderness.  normal BS.  No rebound or guarding  EXTREMITIES:    No clubbing, cyanosis, or edema  SKIN: No rashes or lesions                            12.3   7.90  )-----------( 111      ( 11 Jan 2019 08:13 )             37.9     01-11    140  |  102  |  2<L>  ----------------------------<  87  3.8   |  31  |  0.88    Ca    8.6      11 Jan 2019 08:13  Phos  3.1     01-11  Mg     2.0     01-11    TPro  6.3  /  Alb  2.2<L>  /  TBili  0.3  /  DBili  x   /  AST  9<L>  /  ALT  16  /  AlkPhos  44  01-10            MEDICATIONS  (STANDING):  cholestyramine Powder (Sugar-Free) 4 Gram(s) Oral daily  lactated ringers. 1000 milliLiter(s) (75 mL/Hr) IV Continuous <Continuous>  levothyroxine 75 MICROGram(s) Oral daily  mesalamine DR Capsule 1200 milliGRAM(s) Oral three times a day  mesalamine Suppository 1000 milliGRAM(s) Rectal at bedtime    MEDICATIONS  (PRN):  hyoscyamine SL 0.125 milliGRAM(s) SubLingual four times a day PRN cramps  morphine  - Injectable 2 milliGRAM(s) IV Push every 4 hours PRN Severe Pain (7 - 10)  zolpidem 5 milliGRAM(s) Oral at bedtime PRN Insomnia                     Imaging Personally Reviewed:     [x ] YES  [ ] NO    Consultant(s) Notes Reviewed:  [x ] YES  [ ] NO    Care Discussed with Consultants/Other Providers [x ] YES  [ ] No medical contraindication for discharge
CC.  Abdm Pain/Diarrhea  HPI.  Patient reports severe abdm pain after eating Lamp chop for dinner last night.  + persistent diarrhea with blood per stool.  Tolerating some po intake.  Nausea at time.  Offers no other complaints              Constitutional: No fever, fatigue or weight loss.  Skin: No rash.  Eyes: No recent vision problems or eye pain.  ENT: No congestion, ear pain, or sore throat.  Endocrine: No thyroid problems.  Cardiovascular: No chest pain or palpation.  Respiratory: No cough, shortness of breath, congestion, or wheezing.  Gastrointestinal: see above  Genitourinary: No dysuria.  Musculoskeletal: No joint swelling.  Neurologic: No headache.      Vital Signs Last 24 Hrs  T(C): 36.5 (10 Cullen 2019 09:33), Max: 36.8 (2019 21:59)  T(F): 97.7 (10 Cullen 2019 09:33), Max: 98.2 (2019 21:59)  HR: 87 (10 Cullen 2019 09:33) (87 - 99)  BP: 125/74 (10 Cullen 2019 09:33) (124/82 - 138/82)  BP(mean): --  RR: 19 (10 Cullen 2019 09:33) (16 - 19)  SpO2: 95% (10 Cullen 2019 09:33) (92% - 97%)        PHYSICAL EXAM-  GENERAL: NAD, well-groomed, well-developed  HEAD:  Atraumatic, Normocephalic  EYES: EOMI, PERRLA, conjunctiva and sclera clear  NECK: Supple, No JVD, Normal thyroid  NERVOUS SYSTEM:  Alert & Oriented X3, Motor Strength 5/5 B/L upper and lower extremities; DTRs 2+ intact and symmetric  CHEST/LUNG: Clear to percussion bilaterally; No rales, rhonchi, wheezing, or rubs  HEART: Regular rate and rhythm; No murmurs, rubs, or gallops  ABDOMEN: Soft, diffuse abdm tenderness.  Hyperactive BS.  No rebound or guarding  EXTREMITIES:    No clubbing, cyanosis, or edema  SKIN: No rashes or lesions                            12.1   8.58  )-----------( clumps    ( 10 Cullen 2019 07:52 )             38.0     01-10    138  |  101  |  3<L>  ----------------------------<  90  3.3<L>   |  28  |  0.82    Ca    8.5      10 Cullen 2019 07:52  Phos  2.9     -10  Mg     1.8     -10    TPro  6.3  /  Alb  2.2<L>  /  TBili  0.3  /  DBili  x   /  AST  9<L>  /  ALT  16  /  AlkPhos  44  01-10          Urinalysis Basic - ( 2019 21:55 )    Color: Yellow / Appearance: Clear / S.005 / pH: x  Gluc: x / Ketone: Moderate  / Bili: Negative / Urobili: Negative mg/dL   Blood: x / Protein: 15 mg/dL / Nitrite: Negative   Leuk Esterase: Negative / RBC: x / WBC x   Sq Epi: x / Non Sq Epi: x / Bacteria: Occasional      PT/INR - ( 2019 17:14 )   PT: 14.7 sec;   INR: 1.34 ratio         PTT - ( 2019 17:14 )  PTT:34.9 sec          MEDICATIONS  (STANDING):  cholestyramine Powder (Sugar-Free) 4 Gram(s) Oral daily  lactated ringers. 1000 milliLiter(s) (75 mL/Hr) IV Continuous <Continuous>  levothyroxine 75 MICROGram(s) Oral daily  magnesium sulfate  IVPB 1 Gram(s) IV Intermittent once  mesalamine DR Capsule 1200 milliGRAM(s) Oral three times a day  potassium chloride    Tablet ER 20 milliEquivalent(s) Oral once    MEDICATIONS  (PRN):  morphine  - Injectable 2 milliGRAM(s) IV Push every 4 hours PRN Severe Pain (7 - 10)  zolpidem 5 milliGRAM(s) Oral at bedtime PRN Insomnia               Imaging Personally Reviewed:     [x ] YES  [ ] NO    Consultant(s) Notes Reviewed:  [x ] YES  [ ] NO    Care Discussed with Consultants/Other Providers [x ] YES  [ ] No medical contraindication for discharge
INTERVAL HPI/OVERNIGHT EVENTS:  No new overnight event.  No N/V still pain in llq and lack of an apetite  Allergies    penicillin (Swelling)    Intolerances  General:  No wt loss, fevers, chills, night sweats, fatigue,   Eyes:  Good vision, no reported pain  ENT:  No sore throat, pain, runny nose, dysphagia  CV:  No pain, palpitations, hypo/hypertension  Resp:  No dyspnea, cough, tachypnea, wheezing  GI:  No pain, No nausea, No vomiting, No diarrhea, No constipation, No weight loss, No fever, No pruritis, No rectal bleeding, No tarry stools, No dysphagia,  :  No pain, bleeding, incontinence, nocturia  Muscle:  No pain, weakness  Neuro:  No weakness, tingling, memory problems  Psych:  No fatigue, insomnia, mood problems, depression  Endocrine:  No polyuria, polydipsia, cold/heat intolerance  Heme:  No petechiae, ecchymosis, easy bruisability  Skin:  No rash, tattoos, scars, edema    PHYSICAL EXAM:   Vital Signs:  Vital Signs Last 24 Hrs  T(C): 36.5 (10 Cullen 2019 09:33), Max: 36.8 (2019 21:59)  T(F): 97.7 (10 Cullen 2019 09:33), Max: 98.2 (2019 21:59)  HR: 87 (10 Cullen 2019 09:33) (87 - 99)  BP: 125/74 (10 Cullen 2019 09:33) (124/82 - 138/82)  BP(mean): --  RR: 19 (10 Cullen 2019 09:33) (16 - 19)  SpO2: 95% (10 Cullen 2019 09:33) (92% - 97%)  Daily     Daily I&O's Summary    2019 07:01  -  10 Cullen 2019 07:00  --------------------------------------------------------  IN: 1000 mL / OUT: 0 mL / NET: 1000 mL        GENERAL:  Appears stated age, well-groomed, well-nourished, no distress  HEENT:  NC/AT,  conjunctivae clear and pink, no thyromegaly, nodules, adenopathy, no JVD, sclera -anicteric  CHEST:  Full & symmetric excursion, no increased effort, breath sounds clear  HEART:  Regular rhythm, S1, S2, no murmur/rub/S3/S4, no abdominal bruit, no edema  ABDOMEN:  Soft, non-tender, non-distended, normoactive bowel sounds,  no masses ,no hepato-splenomegaly, no signs of chronic liver disease  EXTEREMITIES:  no cyanosis,clubbing or edema  SKIN:  No rash/erythema/ecchymoses/petechiae/wounds/abscess/warm/dry  NEURO:  Alert, oriented, no asterixis, no tremor, no encephalopathy      LABS:                        12.1   8.58  )-----------( clumps    ( 10 Cullen 2019 07:52 )             38.0     -10    138  |  101  |  3<L>  ----------------------------<  90  3.3<L>   |  28  |  0.82    Ca    8.5      10 Cullen 2019 07:52  Phos  2.9     -10  Mg     1.8     01-10    TPro  6.3  /  Alb  2.2<L>  /  TBili  0.3  /  DBili  x   /  AST  9<L>  /  ALT  16  /  AlkPhos  44  -10    PT/INR - ( 2019 17:14 )   PT: 14.7 sec;   INR: 1.34 ratio         PTT - ( 2019 17:14 )  PTT:34.9 sec  Urinalysis Basic - ( 2019 21:55 )    Color: Yellow / Appearance: Clear / S.005 / pH: x  Gluc: x / Ketone: Moderate  / Bili: Negative / Urobili: Negative mg/dL   Blood: x / Protein: 15 mg/dL / Nitrite: Negative   Leuk Esterase: Negative / RBC: x / WBC x   Sq Epi: x / Non Sq Epi: x / Bacteria: Occasional      amylase   lipaseLipase, Serum: 50 U/L ( @ 17:14)    RADIOLOGY & ADDITIONAL TESTS:
INTERVAL HPI/OVERNIGHT EVENTS:  feels great    MEDICATIONS  (STANDING):  cholestyramine Powder (Sugar-Free) 4 Gram(s) Oral daily  lactated ringers. 1000 milliLiter(s) (75 mL/Hr) IV Continuous <Continuous>  levothyroxine 75 MICROGram(s) Oral daily  mesalamine DR Capsule 1200 milliGRAM(s) Oral three times a day  mesalamine Suppository 1000 milliGRAM(s) Rectal at bedtime    MEDICATIONS  (PRN):  hyoscyamine SL 0.125 milliGRAM(s) SubLingual four times a day PRN cramps  morphine  - Injectable 2 milliGRAM(s) IV Push every 4 hours PRN Severe Pain (7 - 10)  zolpidem 5 milliGRAM(s) Oral at bedtime PRN Insomnia      Allergies    penicillin (Swelling)    Intolerances        Review of Systems:    General:  No wt loss, fevers, chills, night sweats,fatigue,   Eyes:  Good vision, no reported pain  ENT:  No sore throat, pain, runny nose, dysphagia  CV:  No pain, palpitatioins, hypo/hypertension  Resp:  No dyspnea, cough, tachypnea, wheezing  GI:  No pain, No nausea, No vomiting, No diarrhea, No constipatiion, No weight loss, No fever, No pruritis, No rectal bleeding, No tarry stools, No dysphagia,  :  No pain, bleeding, incontinence, nocturia  Muscle:  No pain, weakness  Neuro:  No weakness, tingling, memory problems  Psych:  No fatigue, insomnia, mood problems, depression  Endocrine:  No polyuria, polydypsia, cold/heat intolerance  Heme:  No petechiae, ecchymosis, easy bruisability  Skin:  No rash, tattoos, scars, edema      Vital Signs Last 24 Hrs  T(C): 36.4 (13 Jan 2019 09:33), Max: 37.2 (12 Jan 2019 21:11)  T(F): 97.6 (13 Jan 2019 09:33), Max: 98.9 (12 Jan 2019 21:11)  HR: 91 (13 Jan 2019 09:33) (72 - 91)  BP: 109/64 (13 Jan 2019 09:33) (109/64 - 136/75)  BP(mean): --  RR: 16 (13 Jan 2019 09:33) (16 - 18)  SpO2: 96% (13 Jan 2019 09:33) (95% - 97%)    PHYSICAL EXAM:    Constitutional: NAD, well-developed  HEENT: EOMI, throat clear  Neck: No LAD, supple  Respiratory: CTA and P  Cardiovascular: S1 and S2, RRR, no M  Gastrointestinal: BS+, soft, NT/ND, neg HSM,  Extremities: No peripheral edema, neg clubing, cyanosis  Vascular: 2+ peripheral pulses  Neurological: A/O x 3, no focal deficits  Psychiatric: Normal mood, normal affect  Skin: No rashes      LABS:                        13.6   7.93  )-----------( 152      ( 13 Jan 2019 07:10 )             42.4     01-13    138  |  101  |  4<L>  ----------------------------<  123<H>  3.6   |  28  |  1.06    Ca    9.1      13 Jan 2019 07:13  Phos  3.2     01-12  Mg     1.7     01-12    TPro  7.1  /  Alb  2.7<L>  /  TBili  0.3  /  DBili  x   /  AST  18  /  ALT  20  /  AlkPhos  46  01-12          RADIOLOGY & ADDITIONAL TESTS:
INTERVAL HPI/OVERNIGHT EVENTS:  feels much better  MEDICATIONS  (STANDING):  cholestyramine Powder (Sugar-Free) 4 Gram(s) Oral daily  lactated ringers. 1000 milliLiter(s) (75 mL/Hr) IV Continuous <Continuous>  levothyroxine 75 MICROGram(s) Oral daily  mesalamine DR Capsule 1200 milliGRAM(s) Oral three times a day  mesalamine Suppository 1000 milliGRAM(s) Rectal at bedtime    MEDICATIONS  (PRN):  hyoscyamine SL 0.125 milliGRAM(s) SubLingual four times a day PRN cramps  morphine  - Injectable 2 milliGRAM(s) IV Push every 4 hours PRN Severe Pain (7 - 10)  zolpidem 5 milliGRAM(s) Oral at bedtime PRN Insomnia      Allergies    penicillin (Swelling)    Intolerances        Review of Systems:    General:  No wt loss, fevers, chills, night sweats,fatigue,   Eyes:  Good vision, no reported pain  ENT:  No sore throat, pain, runny nose, dysphagia  CV:  No pain, palpitatioins, hypo/hypertension  Resp:  No dyspnea, cough, tachypnea, wheezing  GI:  No pain, No nausea, No vomiting, No diarrhea, No constipatiion, No weight loss, No fever, No pruritis, No rectal bleeding, No tarry stools, No dysphagia,  :  No pain, bleeding, incontinence, nocturia  Muscle:  No pain, weakness  Neuro:  No weakness, tingling, memory problems  Psych:  No fatigue, insomnia, mood problems, depression  Endocrine:  No polyuria, polydypsia, cold/heat intolerance  Heme:  No petechiae, ecchymosis, easy bruisability  Skin:  No rash, tattoos, scars, edema      Vital Signs Last 24 Hrs  T(C): 36.6 (12 Jan 2019 17:23), Max: 36.7 (11 Jan 2019 18:30)  T(F): 97.8 (12 Jan 2019 17:23), Max: 98 (11 Jan 2019 18:30)  HR: 84 (12 Jan 2019 17:23) (72 - 90)  BP: 136/75 (12 Jan 2019 17:23) (118/77 - 156/83)  BP(mean): --  RR: 187 (12 Jan 2019 17:23) (16 - 187)  SpO2: 95% (12 Jan 2019 17:23) (93% - 97%)    PHYSICAL EXAM:    Constitutional: NAD, well-developed  HEENT: EOMI, throat clear  Neck: No LAD, supple  Respiratory: CTA and P  Cardiovascular: S1 and S2, RRR, no M  Gastrointestinal: BS+, soft, NT/ND, neg HSM,  Extremities: No peripheral edema, neg clubing, cyanosis  Vascular: 2+ peripheral pulses  Neurological: A/O x 3, no focal deficits  Psychiatric: Normal mood, normal affect  Skin: No rashes      LABS:                        13.3   8.46  )-----------( 129      ( 12 Jan 2019 07:09 )             41.0     01-12    139  |  102  |  3<L>  ----------------------------<  112<H>  3.5   |  27  |  0.97    Ca    8.8      12 Jan 2019 07:13  Phos  3.2     01-12  Mg     1.7     01-12    TPro  7.1  /  Alb  2.7<L>  /  TBili  0.3  /  DBili  x   /  AST  18  /  ALT  20  /  AlkPhos  46  01-12          RADIOLOGY & ADDITIONAL TESTS:
Patient is a 73y old  Male who presents with a chief complaint of abdominal pain (11 Jan 2019 17:36)      INTERVAL HPI/OVERNIGHT EVENTS: no acute overnight events . patient is seen and examined at the bed side.  had 1 episode of loose stool this am.     MEDICATIONS  (STANDING):  cholestyramine Powder (Sugar-Free) 4 Gram(s) Oral daily  lactated ringers. 1000 milliLiter(s) (75 mL/Hr) IV Continuous <Continuous>  levothyroxine 75 MICROGram(s) Oral daily  mesalamine DR Capsule 1200 milliGRAM(s) Oral three times a day  mesalamine Suppository 1000 milliGRAM(s) Rectal at bedtime    MEDICATIONS  (PRN):  hyoscyamine SL 0.125 milliGRAM(s) SubLingual four times a day PRN cramps  morphine  - Injectable 2 milliGRAM(s) IV Push every 4 hours PRN Severe Pain (7 - 10)  zolpidem 5 milliGRAM(s) Oral at bedtime PRN Insomnia      Allergies    penicillin (Swelling)    Intolerances        REVIEW OF SYSTEMS:  CONSTITUTIONAL: No fever or chills  HEENT:  No headache, no sore throat  RESPIRATORY: No cough, wheezing, or shortness of breath  CARDIOVASCULAR: No chest pain, palpitations, or leg swelling  GASTROINTESTINAL: No nausea, vomiting,  has  diarrhea  GENITOURINARY: No dysuria, frequency, or hematuria  NEUROLOGICAL: no focal weakness or dizziness  SKIN:  No rashes or lesions   MUSCULOSKELETAL: no myalgias   PSYCHIATRIC: No depression or anxiety      Vital Signs Last 24 Hrs  T(C): 36.3 (12 Jan 2019 10:08), Max: 36.7 (11 Jan 2019 18:30)  T(F): 97.4 (12 Jan 2019 10:08), Max: 98 (11 Jan 2019 18:30)  HR: 83 (12 Jan 2019 10:08) (79 - 91)  BP: 118/77 (12 Jan 2019 10:08) (118/77 - 156/83)  BP(mean): --  RR: 16 (12 Jan 2019 10:08) (16 - 20)  SpO2: 93% (12 Jan 2019 05:32) (93% - 96%)    PHYSICAL EXAM:  GENERAL: NAD  HEENT:  EOMI, mmm  CHEST/LUNG:  CTA b/l, no rales, wheezes, or rhonchi  HEART:  RRR, S1, S2, []murmur  ABDOMEN:  BS+, soft, NT, ND  EXTREMITIES: no edema or calf tenderness  NERVOUS SYSTEM: AA&Ox3 , no focal neurological deficit.     DIET:     Cultures: Culture Results:   GI PCR Results: NOT detected  *******Please Note:*******  GI panel PCR evaluates for:  Campylobacter, Plesiomonas shigelloides, Salmonella,  Vibrio, Yersinia enterocolitica, Enteroaggregative  Escherichia coli (EAEC), Enteropathogenic E.coli (EPEC),  Enterotoxigenic E. coli (ETEC) lt/st, Shiga-like  toxin-producing E. coli (STEC) stx1/stx2,  Shigella/ Enteroinvasive E. coli (EIEC), Cryptosporidium,  Cyclospora cayetanensis, Entamoeba histolytica,  Giardia lamblia, Adenovirus F 40/41, Astrovirus,  Norovirus GI/GII, Rotavirus A, Sapovirus (01-09 @ 21:12)      LABS:                        13.3   8.46  )-----------( 129      ( 12 Jan 2019 07:09 )             41.0     CBC Full  -  ( 12 Jan 2019 07:09 )  WBC Count : 8.46 K/uL  Hemoglobin : 13.3 g/dL  Hematocrit : 41.0 %  Platelet Count - Automated : 129 K/uL  Mean Cell Volume : 83.8 fl  Mean Cell Hemoglobin : 27.2 pg  Mean Cell Hemoglobin Concentration : 32.4 gm/dL  Auto Neutrophil # : x  Auto Lymphocyte # : x  Auto Monocyte # : x  Auto Eosinophil # : x  Auto Basophil # : x  Auto Neutrophil % : x  Auto Lymphocyte % : x  Auto Monocyte % : x  Auto Eosinophil % : x  Auto Basophil % : x    12 Jan 2019 07:13    139    |  102    |  3      ----------------------------<  112    3.5     |  27     |  0.97     Ca    8.8        12 Jan 2019 07:13  Phos  3.2       12 Jan 2019 07:13  Mg     1.7       12 Jan 2019 07:13    TPro  7.1    /  Alb  2.7    /  TBili  0.3    /  DBili  x      /  AST  18     /  ALT  20     /  AlkPhos  46     12 Jan 2019 07:13        CAPILLARY BLOOD GLUCOSE              RADIOLOGY & ADDITIONAL TESTS:    Personally reviewed.     Consultant(s) Notes Reviewed:  [x] YES  [ ] NO    Care Discussed with [ ] Consultants  [x] Patient  [ ] Family  [x]      [ ] Other; RN  DVT ppx  Advanced directive
Patient is a 73y old  Male who presents with a chief complaint of abdominal pain (13 Jan 2019 11:51)    HPI:  73M with hypothyroidism and ulcerative colitis who presents with abdominal pain.  Symptoms initially started in October with diarrhea and was prescribed steroids by GI.  Had a colonoscopy that showed colitis.  In December, patient was then diagnosed with Cdiff and was prescribed 14 days of oral vancomycin (finished Hermelinda day).  Did not have any abdominal pain at that time.  The diarrhea would be loose and watery with intermittent bleeding.  Then patient started having LLQ pain starting New Years Yolanda (about 8-9 days ago).  No radiation.  Worse with eating but better with lying down.  Pain also abates after he defecates and moves his bowels.  Associated with decreased appetite.  Denied any fevers.  Patient spoke to GI again and was prescribed oral vancomycin again on Saturday, 1/5.  Started to have some nausea but no vomiting.   Pain did not improve the next couple of days, went to GI today, and was told to come to the ED for further evaluation and treatment.  However,  patient did admit that his pain is improving starting today.  In the ED, CT showed pancolitis.  Patient being admitted for colitis, possible Cdiff pancolitis. (08 Jan 2019 21:22)    Today:  Pt notes stool is improved. Denies diarrhea. Pt is tolerating diet. Denies abdominal pain. No fevers.     PAST MEDICAL & SURGICAL HISTORY:  Colitis  Hypothyroid  History of left hip replacement    MEDICATIONS  (STANDING):  cholestyramine Powder (Sugar-Free) 4 Gram(s) Oral daily  lactated ringers. 1000 milliLiter(s) (75 mL/Hr) IV Continuous <Continuous>  levothyroxine 75 MICROGram(s) Oral daily  mesalamine DR Capsule 1200 milliGRAM(s) Oral three times a day  mesalamine Suppository 1000 milliGRAM(s) Rectal at bedtime    MEDICATIONS  (PRN):  hyoscyamine SL 0.125 milliGRAM(s) SubLingual four times a day PRN cramps  morphine  - Injectable 2 milliGRAM(s) IV Push every 4 hours PRN Severe Pain (7 - 10)  zolpidem 5 milliGRAM(s) Oral at bedtime PRN Insomnia    EXAM:  Vital Signs Last 24 Hrs  T(C): 36.4 (13 Jan 2019 09:33), Max: 37.2 (12 Jan 2019 21:11)  T(F): 97.6 (13 Jan 2019 09:33), Max: 98.9 (12 Jan 2019 21:11)  HR: 91 (13 Jan 2019 09:33) (72 - 91)  BP: 109/64 (13 Jan 2019 09:33) (109/64 - 136/75)  BP(mean): --  RR: 16 (13 Jan 2019 09:33) (16 - 18)  SpO2: 96% (13 Jan 2019 09:33) (95% - 97%)    01-12 @ 07:01  -  01-13 @ 07:00  --------------------------------------------------------  IN: 1725 mL / OUT: 0 mL / NET: 1725 mL    PHYSICAL EXAM:  Constitutional: awake sitting in chair. nad.   ENMT: patent nares, moist mucus membranes  Neck: supple  Back: non tender. no scoliosis.   Respiratory: bilaterally clear to auscultation, no wheezing, no rhonchi, no crackles, no decreased air entry  Cardiovascular: s1s2, rrr, no murmurs.   Gastrointestinal: soft, non tender, +bowel sounds, no rebound, no guarding.   Extremities: no edema.   Neurological: alert and oriented to person, date and place.   Skin: intact no rash, warm to touch.   Musculoskeletal: moves all 4 extremities  Psychiatric: no homicidal, suicidal ideation. appropriate affect.     LABS:  LIVER FUNCTIONS - ( 12 Jan 2019 07:13 )  Alb: 2.7 g/dL / Pro: 7.1 g/dL / ALK PHOS: 46 U/L / ALT: 20 U/L DA / AST: 18 U/L / GGT: x                            13.6   7.93  )-----------( 152      ( 13 Jan 2019 07:10 )             42.4   01-13  138  |  101  |  4<L>  ----------------------------<  123<H>  3.6   |  28  |  1.06  Ca    9.1      13 Jan 2019 07:13  Phos  3.2     01-12  Mg     1.7     01-12  TPro  7.1  /  Alb  2.7<L>  /  TBili  0.3  /  DBili  x   /  AST  18  /  ALT  20  /  AlkPhos  46  01-12    Chart reviewed.   Labs reviewed.  Imaging reviewed.   Plan discussed with consultants.

## 2019-01-13 NOTE — PROGRESS NOTE ADULT - REASON FOR ADMISSION
abdominal pain

## 2019-01-13 NOTE — PROGRESS NOTE ADULT - ASSESSMENT
73M with hypothyroidism and ulcerative colitis who presents with abdominal pain, found to have pancolitis, likely Cdiff colitis.      1.  Pancolitis.    -Abdm CT scan noticed.  Suspect C-diff colitis  -Continue with PO vanco, and advance diet as per GI  -C-diff pending  -GI to follow up     2.  Hypothyroidism, cont with synthroid.     3.  Hypokalemia/Low magnesium.  Replaced.  repeat level in am    Plan of care was discussed with patient,  in great details, All questions were answered to their satisfaction  Seems to understand, and in agreement
73M with hypothyroidism and ulcerative colitis who presents with abdominal pain, found to have pancolitis,       1.  Pancolitis.    -Abdominal  CT scan  showed pancolitis.  -Off antibiotic as per ID.   Id follow up appreciated.    Advance diet as tolerated.and Monitor clinically.   -C-diff negative.  Stool culture negative.     -GI follow up appreciated.   Continue with Cholestyramine.     2.  Hypothyroidism,- stable.    continue  with synthroid.     3.  Hypokalemia/Low magnesium.  Resolved.   monitor BMP closely.     Plan of care was discussed with patient,  in great details, All questions were answered to their satisfaction  Seems to understand, and in agreement  -Dispo: discharge home today on cholestyramine as prescribed.
73M with hypothyroidism and ulcerative colitis who presents with abdominal pain, found to have pancolitis,       1.  Pancolitis.    -Abdominal  CT scan  showed pancolitis.  -Off antibiotic as per ID.   Id follow up appreciated.    Advance diet as tolerated.and Monitor clinically.   patient had 1 episode of diarrhea today.   -C-diff negative.  Stool culture negative.     -GI follow up appreciated.   Continue with Cholestyramine.     2.  Hypothyroidism,- stable.    continue  with synthroid.     3.  Hypokalemia/Low magnesium.  Resolved.   monitor BMP closely.     Plan of care was discussed with patient,  in great details, All questions were answered to their satisfaction  Seems to understand, and in agreement
73M with hypothyroidism and ulcerative colitis who presents with abdominal pain, found to have pancolitis, likely Cdiff colitis.      1.  Pancolitis.    -Abdm CT scan noticed.     -Off antibiotic as per ID.  Advance diet as tolerated  -C-diff negative.  Stool culture negative.     -GI to follow up     2.  Hypothyroidism, cont with synthroid.     3.  Hypokalemia/Low magnesium.  Resolved    Plan of care was discussed with patient,  in great details, All questions were answered to their satisfaction  Seems to understand, and in agreement
73M with hypothyroidism and ulcerative colitis who presents with abdominal pain, found to have pancolitis, likely Cdiff colitis.      1.  Pancolitis.    -Abdm CT scan noticed.  Suspect C-diff colitis  -Continue with PO vanco, and advance diet as per GI  -C-diff negative.  Stool culture negative.    -Will discuss case with GI  -GI to follow up     2.  Hypothyroidism, cont with synthroid.     3.  Hypokalemia/Low magnesium.  Replaced.  repeat level in am    Plan of care was discussed with patient,  in great details, All questions were answered to their satisfaction  Seems to understand, and in agreement

## 2019-01-13 NOTE — PROGRESS NOTE ADULT - PROVIDER SPECIALTY LIST ADULT
Gastroenterology
Hospitalist
Infectious Disease
Hospitalist

## 2019-02-04 NOTE — DISCHARGE NOTE ADULT - NS AS DC FU INST LIST INST
no Bilobed Flap Text: The defect edges were debeveled with a #15 scalpel blade.  Given the location of the defect and the proximity to free margins a bilobe flap was deemed most appropriate.  Using a sterile surgical marker, an appropriate bilobe flap drawn around the defect.    The area thus outlined was incised deep to adipose tissue with a #15 scalpel blade.  The skin margins were undermined to an appropriate distance in all directions utilizing iris scissors.

## 2020-08-18 NOTE — ED ADULT TRIAGE NOTE - CCCP TRG CHIEF CMPLNT
Occupational Therapy      Patient Name:  Angelina Beard   MRN:  [id number]    Patient not seen today secondary to Unavailable (Comment)(AM 1118: Pt in sx.). Will follow-up tomorrow.    MARTHA Juarez  8/18/2020   abdominal pain

## 2020-10-27 NOTE — ED PROVIDER NOTE - CROS ED ROS STATEMENT

## 2021-02-01 NOTE — ED ADULT NURSE NOTE - NSFALLRSKASSESSDT_ED_ALL_ED
65 year old male with,    # Multiple recent B/L infarcts/CVA: care per Stroke. Current symptoms of blurry vision explained by location of CVA affecting posterior circulation. Etiology 2/2 LV thrombus, C/w statin/Eliquis. PT/OT -- > ZEINAB.    # Aspiration pneumonia: completed course of Abx last Friday.    # Metabolic encephalopathy: waxing and waning, likely multifactorial, alcohol withdrawal/CVA/pneumonia related. VEEG without e/o epilepsy.    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 Apprec Hem recs. PF4 is negative. Thrombocytopenia has resolved.    # CAD/Chronic systolic CHF: not in acute exacerbation. Cards f/up noted. C/w statin.    # Paroxysmal atrial fibrillation: c/w eliquis and metoprolol.    # Alcohol withdrawal: has resolved. Alcohol dependence-etoh cessation encouraged.    D/w Stroke Team, will continue to follow up with you.  D/w GIANA, working on dc plan to ZEINAB.       08-Jan-2019 17:00

## 2021-05-13 NOTE — DISCHARGE NOTE ADULT - NSCORESITESY/N_GEN_A_CORE_RD
Incoming call from patient who provides a brief summary of his recent hospitalization and rehab.  Patient was discharged from rehaba 5/10/21.  He reports have a removed a callus on his right foot which became infected and treated at [facility] (start of service approx March 23) for acute osteomyelitis.  He is currently using a borrowed walker and living with his sister.  Patient states he is eating/drinking fine.  He states he is able to complete ADL's independently and can make his needs known.     Identified issues discussed with patient today:  1) Right arm PICC line remains in place and was not pulled prior to discharge from rehab. Patient states antibiotic treatment via PICC ended over 1 week ago. Patient states that rehab did not have orders from surgeon prior to his discharge to pull line (?)  2) Patient was to follow-up with Dr Diaz Cameron (podiatrist/surgeon) in 1-2 weeks - has this taken place?  3) Review of medications with patient with several concerns: Patient states he was taking 18u of insulin daily while in rehab - but he states this is not on his discharge paper work and he does not have in his home.  He states he was also placed on hydralazine (25mg tid) and metoprolol while at [facility]. He has these two medications.  Medications added per reconcile outside info tab.  Patient is also taking 10mg lisinopril. Lipitor on med list states 10mg  Nightly (1/2 tablet of 20mg).  Discharge summary states 20mg daily  4) Confirm post hospital labs have been completed.  5) Patient was to follow-up with Dr Wayne To within 2 weeks to schedule appointment.      Call placed to [facility] at [phone].  Left message for Delores, Directory of Nursing to return call to our clinic.  Provider line provided.          No

## 2021-08-16 NOTE — ED PROVIDER NOTE - NS ED MD DISPO DISCHARGE
CHIEF COMPLAINT  Chief Complaint   Patient presents with    Well Child        HPI   Arcelia Oscar is a 18 y.o. male who presents to the office   No other complaints, modifying factors or associated symptoms. Nursing notes reviewed. Past Medical History:   Diagnosis Date    ADHD (attention deficit hyperactivity disorder)     psych    Adverse effects of medication 5/10    [facility] hospitalization for rxn to methylphenidate    Allergic rhinoconjunctivitis     Bronchiolitis     Mild intermittent asthma without complication     Murmur     Prematurity     Sepsis, unspecified      No past surgical history on file. No family history on file. Social History     Socioeconomic History    Marital status: Single     Spouse name: Not on file    Number of children: Not on file    Years of education: Not on file    Highest education level: Not on file   Occupational History    Not on file   Tobacco Use    Smoking status: Never Smoker    Smokeless tobacco: Never Used   Substance and Sexual Activity    Alcohol use: No     Alcohol/week: 0.0 standard drinks    Drug use: No    Sexual activity: Never   Other Topics Concern    Not on file   Social History Narrative    Not on file     Social Determinants of Health     Financial Resource Strain:     Difficulty of Paying Living Expenses:    Food Insecurity:     Worried About Running Out of Food in the Last Year:     [address] in the Last Year:    Transportation Needs:     Lack of Transportation (Medical):      Lack of Transportation (Non-Medical):    Physical Activity:     Days of Exercise per Week:     Minutes of Exercise per Session:    Stress:     Feeling of Stress :    Social Connections:     Frequency of Communication with Friends and Family:     Frequency of Social Gatherings with Friends and Family:     Attends [employer] Services:     Active Member of Clubs or Organizations:     Attends Club or Organization Meetings:     Marital Status:    Intimate Partner Violence:     Fear of Current or Ex-Partner:     Emotionally Abused:     Physically Abused:     Sexually Abused:      No current outpatient medications on file. No current facility-administered medications for this visit. No Known Allergies    REVIEW OF SYSTEMS  Review of Systems     PHYSICAL EXAM  /70   Pulse 58   Temp 98.3 °F (36.8 °C) (Oral)   Ht 5' 8\" (1.727 m)   Wt 150 lb 8 oz (68.3 kg)   SpO2 99%   BMI 22.88 kg/m²   Physical Exam     RADIOLOGY  XR KNEE LEFT (3 VIEWS)    Result Date: 7/28/2021  Radiology exam is complete. No Radiologist dictation. Please follow up with ordering provider. ASSESSMENT/PLAN:   There are no diagnoses linked to this encounter. The note was completed using Dragon voice recognition transcription. Every effort was made to ensure accuracy; however, inadvertent  transcription errors may be present despite my best efforts to edit errors.     Annika Coon, APRN - CNP Home

## 2022-04-26 NOTE — DISCHARGE NOTE ADULT - SECONDARY DIAGNOSIS.
How Severe Is Your Skin Lesion?: mild
Has Your Skin Lesion Been Treated?: been treated
Is This A New Presentation, Or A Follow-Up?: Growth
Which Family Member (Optional)?: Mother
Hypothyroid

## 2022-12-06 ENCOUNTER — APPOINTMENT (OUTPATIENT)
Dept: ORTHOPEDIC SURGERY | Facility: CLINIC | Age: 77
End: 2022-12-06

## 2022-12-06 VITALS — WEIGHT: 165 LBS | HEIGHT: 68 IN | BODY MASS INDEX: 25.01 KG/M2

## 2022-12-06 DIAGNOSIS — Z78.9 OTHER SPECIFIED HEALTH STATUS: ICD-10-CM

## 2022-12-06 PROCEDURE — 99203 OFFICE O/P NEW LOW 30 MIN: CPT | Mod: 25

## 2022-12-06 PROCEDURE — 20611 DRAIN/INJ JOINT/BURSA W/US: CPT | Mod: 50

## 2022-12-06 RX ORDER — METHYLPREDNISOLONE ACETATE 40 MG/ML
40 INJECTION, SUSPENSION INTRA-ARTICULAR; INTRALESIONAL; INTRAMUSCULAR; SOFT TISSUE
Qty: 1 | Refills: 0 | Status: COMPLETED | OUTPATIENT
Start: 2022-12-06

## 2022-12-06 NOTE — PHYSICAL EXAM
[NL (0)] : extension 0 degrees [5___] : quadriceps 5[unfilled]/5 [Bilateral] : knee bilaterally [AP] : anteroposterior [Lateral] : lateral [[place]] : skyline [Outside films reviewed] : Outside films reviewed [advanced tricompartmental OA with medial compartment narrowing and varus alignment] : advanced tricompartmental OA with medial compartment narrowing and varus alignment [Moderate patellofemoral OA] : Moderate patellofemoral OA [] : non-antalgic [TWNoteComboBox7] : flexion 135 degrees

## 2022-12-06 NOTE — PROCEDURE
[Large Joint Injection] : Large joint injection [Bilateral] : bilaterally of the [Knee] : knee [Pain] : pain [X-ray evidence of Osteoarthritis on this or prior visit] : x-ray evidence of Osteoarthritis on this or prior visit [Betadine] : betadine [Ethyl Chloride sprayed topically] : ethyl chloride sprayed topically [Sterile technique used] : sterile technique used [___ cc    1%] : Lidocaine ~Vcc of 1%  [___ cc    40mg] : Methylprednisolone (Depomedrol) ~Vcc of 40 mg  [] : Patient tolerated procedure well [Apply ice for 15min out of every hour for the next 12-24 hours as tolerated] : apply ice for 15 minutes out of every hour for the next 12-24 hours as tolerated [Patient was advised to rest the joint(s) for ____ days] : patient was advised to rest the joint(s) for [unfilled] days [Risks, benefits, alternatives discussed / Verbal consent obtained] : the risks benefits, and alternatives have been discussed, and verbal consent was obtained

## 2022-12-06 NOTE — REASON FOR VISIT
[FreeTextEntry2] : Patient is a 77 year old male with complaints of chronic B Knee pain. Pain mostly when walking. Pain getting up form sitting. Pain with stairs Patient has XRAYS with him ordered by his GP; reviewed with him today

## 2022-12-06 NOTE — HISTORY OF PRESENT ILLNESS
[Gradual] : gradual [8] : 8 [5] : 5 [Dull/Aching] : dull/aching [Sharp] : sharp [Intermittent] : intermittent [Leisure] : leisure [Rest] : rest [Sitting] : sitting [Stairs] : stairs [] : Post Surgical Visit: no [de-identified] : X Rays

## 2023-01-10 ENCOUNTER — APPOINTMENT (OUTPATIENT)
Dept: ORTHOPEDIC SURGERY | Facility: CLINIC | Age: 78
End: 2023-01-10
Payer: MEDICARE

## 2023-01-10 VITALS — WEIGHT: 165 LBS | HEIGHT: 68 IN | BODY MASS INDEX: 25.01 KG/M2

## 2023-01-10 PROCEDURE — 99213 OFFICE O/P EST LOW 20 MIN: CPT

## 2023-01-10 RX ORDER — COVID-19 MOLECULAR TEST ASSAY
KIT MISCELLANEOUS
Qty: 8 | Refills: 0 | Status: COMPLETED | COMMUNITY
Start: 2022-08-22 | End: 2023-01-10

## 2023-01-10 NOTE — PHYSICAL EXAM
[Bilateral] : knee bilaterally [NL (0)] : extension 0 degrees [5___] : quadriceps 5[unfilled]/5 [] : non-antalgic [TWNoteComboBox7] : flexion 135 degrees

## 2023-01-10 NOTE — HISTORY OF PRESENT ILLNESS
[Gradual] : gradual [3] : 3 [Dull/Aching] : dull/aching [Intermittent] : intermittent [Injection therapy] : injection therapy [Walking] : walking [Stairs] : stairs [] : Post Surgical Visit: no [de-identified] : 12/06/22 [de-identified] : Dr. Tomlinson

## 2023-01-10 NOTE — REASON FOR VISIT
[FreeTextEntry2] : Patient feels some improvement in B Knees since his last visit. Patient states that cortisone injection was helpful but pain has since returned. Pain increases with walking and going up and down the stairs. Patient would like to discuss doing HA injections.

## 2023-02-06 ENCOUNTER — APPOINTMENT (OUTPATIENT)
Dept: ORTHOPEDIC SURGERY | Facility: CLINIC | Age: 78
End: 2023-02-06
Payer: MEDICARE

## 2023-02-06 VITALS — HEIGHT: 68 IN | BODY MASS INDEX: 25.01 KG/M2 | WEIGHT: 165 LBS

## 2023-02-06 PROCEDURE — 20611 DRAIN/INJ JOINT/BURSA W/US: CPT | Mod: 50

## 2023-02-06 RX ORDER — HYALURONATE SODIUM 30 MG/2 ML
30 SYRINGE (ML) INTRAARTICULAR
Refills: 0 | Status: COMPLETED | OUTPATIENT
Start: 2023-02-06

## 2023-02-06 NOTE — PROCEDURE
[Large Joint Injection] : Large joint injection [Bilateral] : bilaterally of the [Knee] : knee [Pain] : pain [X-ray evidence of Osteoarthritis on this or prior visit] : x-ray evidence of Osteoarthritis on this or prior visit [Betadine] : betadine [Ethyl Chloride sprayed topically] : ethyl chloride sprayed topically [Sterile technique used] : sterile technique used [Orthovisc (30mg)] : 30mg of Orthovisc [#1] : series #1 [] : Patient tolerated procedure well [Apply ice for 15min out of every hour for the next 12-24 hours as tolerated] : apply ice for 15 minutes out of every hour for the next 12-24 hours as tolerated [Patient was advised to rest the joint(s) for ____ days] : patient was advised to rest the joint(s) for [unfilled] days [Risks, benefits, alternatives discussed / Verbal consent obtained] : the risks benefits, and alternatives have been discussed, and verbal consent was obtained [Prior failure or difficult injection] : prior failure or difficult injection [All ultrasound images have been permanently captured and stored accordingly in our picture archiving and communication system] : All ultrasound images have been permanently captured and stored accordingly in our picture archiving and communication system [Visualization of the needle and placement of injection was performed without complication] : visualization of the needle and placement of injection was performed without complication

## 2023-02-06 NOTE — HISTORY OF PRESENT ILLNESS
[Gradual] : gradual [3] : 3 [Dull/Aching] : dull/aching [Intermittent] : intermittent [Leisure] : leisure [Stairs] : stairs [Retired] : Work status: retired [1] : 1 [Orthovisc] : Orthovisc [] : Post Surgical Visit: no

## 2023-02-20 ENCOUNTER — APPOINTMENT (OUTPATIENT)
Dept: ORTHOPEDIC SURGERY | Facility: CLINIC | Age: 78
End: 2023-02-20
Payer: MEDICARE

## 2023-02-20 VITALS — WEIGHT: 165 LBS | BODY MASS INDEX: 25.01 KG/M2 | HEIGHT: 68 IN

## 2023-02-20 PROCEDURE — 20611 DRAIN/INJ JOINT/BURSA W/US: CPT | Mod: 50

## 2023-02-20 RX ORDER — HYALURONATE SODIUM 30 MG/2 ML
30 SYRINGE (ML) INTRAARTICULAR
Refills: 0 | Status: COMPLETED | OUTPATIENT
Start: 2023-02-20

## 2023-02-20 NOTE — HISTORY OF PRESENT ILLNESS
[Gradual] : gradual [3] : 3 [Dull/Aching] : dull/aching [Intermittent] : intermittent [Leisure] : leisure [Stairs] : stairs [Retired] : Work status: retired [2] : 2 [Orthovisc] : Orthovisc [] : Post Surgical Visit: no [de-identified] : 2/6/2023

## 2023-02-20 NOTE — PROCEDURE
[Large Joint Injection] : Large joint injection [Bilateral] : bilaterally of the [Knee] : knee [Pain] : pain [X-ray evidence of Osteoarthritis on this or prior visit] : x-ray evidence of Osteoarthritis on this or prior visit [Betadine] : betadine [Ethyl Chloride sprayed topically] : ethyl chloride sprayed topically [Sterile technique used] : sterile technique used [Orthovisc (30mg)] : 30mg of Orthovisc [#2] : series #2 [] : Patient tolerated procedure well [Apply ice for 15min out of every hour for the next 12-24 hours as tolerated] : apply ice for 15 minutes out of every hour for the next 12-24 hours as tolerated [Patient was advised to rest the joint(s) for ____ days] : patient was advised to rest the joint(s) for [unfilled] days [Risks, benefits, alternatives discussed / Verbal consent obtained] : the risks benefits, and alternatives have been discussed, and verbal consent was obtained [Prior failure or difficult injection] : prior failure or difficult injection [All ultrasound images have been permanently captured and stored accordingly in our picture archiving and communication system] : All ultrasound images have been permanently captured and stored accordingly in our picture archiving and communication system [Visualization of the needle and placement of injection was performed without complication] : visualization of the needle and placement of injection was performed without complication

## 2023-02-27 ENCOUNTER — APPOINTMENT (OUTPATIENT)
Dept: ORTHOPEDIC SURGERY | Facility: CLINIC | Age: 78
End: 2023-02-27

## 2023-03-06 ENCOUNTER — APPOINTMENT (OUTPATIENT)
Dept: ORTHOPEDIC SURGERY | Facility: CLINIC | Age: 78
End: 2023-03-06
Payer: MEDICARE

## 2023-03-06 VITALS — BODY MASS INDEX: 25.01 KG/M2 | HEIGHT: 68 IN | WEIGHT: 165 LBS

## 2023-03-06 PROCEDURE — 20611 DRAIN/INJ JOINT/BURSA W/US: CPT | Mod: 50

## 2023-03-06 RX ORDER — HYALURONATE SODIUM 30 MG/2 ML
30 SYRINGE (ML) INTRAARTICULAR
Refills: 0 | Status: COMPLETED | OUTPATIENT
Start: 2023-03-06

## 2023-03-06 NOTE — HISTORY OF PRESENT ILLNESS
[Gradual] : gradual [Dull/Aching] : dull/aching [Intermittent] : intermittent [Leisure] : leisure [Rest] : rest [Stairs] : stairs [Retired] : Work status: retired [3] : 3 [Orthovisc] : Orthovisc [] : Post Surgical Visit: no [de-identified] : 2/20/2023

## 2023-03-06 NOTE — PROCEDURE
[Large Joint Injection] : Large joint injection [Bilateral] : bilaterally of the [Knee] : knee [Pain] : pain [X-ray evidence of Osteoarthritis on this or prior visit] : x-ray evidence of Osteoarthritis on this or prior visit [Betadine] : betadine [Ethyl Chloride sprayed topically] : ethyl chloride sprayed topically [Sterile technique used] : sterile technique used [Orthovisc (30mg)] : 30mg of Orthovisc [#3] : series #3 [] : Patient tolerated procedure well [Apply ice for 15min out of every hour for the next 12-24 hours as tolerated] : apply ice for 15 minutes out of every hour for the next 12-24 hours as tolerated [Patient was advised to rest the joint(s) for ____ days] : patient was advised to rest the joint(s) for [unfilled] days [Risks, benefits, alternatives discussed / Verbal consent obtained] : the risks benefits, and alternatives have been discussed, and verbal consent was obtained [Prior failure or difficult injection] : prior failure or difficult injection [All ultrasound images have been permanently captured and stored accordingly in our picture archiving and communication system] : All ultrasound images have been permanently captured and stored accordingly in our picture archiving and communication system [Visualization of the needle and placement of injection was performed without complication] : visualization of the needle and placement of injection was performed without complication

## 2023-03-13 ENCOUNTER — APPOINTMENT (OUTPATIENT)
Dept: ORTHOPEDIC SURGERY | Facility: CLINIC | Age: 78
End: 2023-03-13
Payer: MEDICARE

## 2023-03-13 VITALS — BODY MASS INDEX: 25.01 KG/M2 | HEIGHT: 68 IN | WEIGHT: 165 LBS

## 2023-03-13 DIAGNOSIS — M17.12 UNILATERAL PRIMARY OSTEOARTHRITIS, LEFT KNEE: ICD-10-CM

## 2023-03-13 DIAGNOSIS — M17.11 UNILATERAL PRIMARY OSTEOARTHRITIS, RIGHT KNEE: ICD-10-CM

## 2023-03-13 PROCEDURE — 20611 DRAIN/INJ JOINT/BURSA W/US: CPT | Mod: 50

## 2023-03-13 RX ORDER — HYALURONATE SODIUM 30 MG/2 ML
30 SYRINGE (ML) INTRAARTICULAR
Refills: 0 | Status: COMPLETED | OUTPATIENT
Start: 2023-03-13

## 2023-03-13 NOTE — PROCEDURE
[Large Joint Injection] : Large joint injection [Bilateral] : bilaterally of the [Knee] : knee [Pain] : pain [X-ray evidence of Osteoarthritis on this or prior visit] : x-ray evidence of Osteoarthritis on this or prior visit [Betadine] : betadine [Ethyl Chloride sprayed topically] : ethyl chloride sprayed topically [Sterile technique used] : sterile technique used [Orthovisc (30mg)] : 30mg of Orthovisc [#4] : series #4 [] : Patient tolerated procedure well [Apply ice for 15min out of every hour for the next 12-24 hours as tolerated] : apply ice for 15 minutes out of every hour for the next 12-24 hours as tolerated [Patient was advised to rest the joint(s) for ____ days] : patient was advised to rest the joint(s) for [unfilled] days [Risks, benefits, alternatives discussed / Verbal consent obtained] : the risks benefits, and alternatives have been discussed, and verbal consent was obtained [Prior failure or difficult injection] : prior failure or difficult injection [All ultrasound images have been permanently captured and stored accordingly in our picture archiving and communication system] : All ultrasound images have been permanently captured and stored accordingly in our picture archiving and communication system [Visualization of the needle and placement of injection was performed without complication] : visualization of the needle and placement of injection was performed without complication

## 2023-03-13 NOTE — HISTORY OF PRESENT ILLNESS
[Gradual] : gradual [3] : 3 [Dull/Aching] : dull/aching [Intermittent] : intermittent [Leisure] : leisure [Rest] : rest [Exercising] : exercising [Retired] : Work status: retired [4] : 4 [Orthovisc] : Orthovisc [] : Post Surgical Visit: no [de-identified] : 3/6/2023

## 2023-03-27 NOTE — ED ADULT NURSE NOTE - CARDIO WDL
Dr. Cordon has ordered labs for you to complete.  Fasting labwork - Do not eat any food or drink any beverages for 12 hours before having the testing done.  You may have water only.  Please take all medications as usual.  Do not drink any alcoholic beverages for 24 hours prior to testing.    [address] lab hours:  Monday-Thursday - 7am-6pm  Friday - 7am-5pm  Saturday - 7am-12pm  Sunday - closed    Please note that any labs and images ordered today will be immediately available on your portal as soon as the results are released. Due to the high volume of results received daily, please allow Dr. Cordon 1-2 business days to review these labs before contacting our office with questions. Any patients that have results that need to be urgently addressed will be contacted that day.       Call [phone] to schedule your imaging tests.     Normal rate, regular rhythm, normal S1, S2 heart sounds heard.

## 2023-05-05 NOTE — ED PROVIDER NOTE - CONDUCTED A DETAILED DISCUSSION WITH PATIENT AND/OR GUARDIAN REGARDING, MDM
lab results/need for outpatient follow-up/return to ED if symptoms worsen, persist or questions arise details

## 2023-06-09 NOTE — PATIENT PROFILE ADULT - LIVES WITH
Pt was here today for Tdap Vaccine, given on the Left Deltoid. Pt tolerated well and was discharged to home with no complication. VIS was given to pt at the end of visit.    NDC: 95824-552-53  LOT#: [id number]  EXP: 9/29/2025    
alone

## 2023-08-21 NOTE — ED PROVIDER NOTE - CHPI ED SYMPTOMS NEG
no chills/no hematuria/no burning urination/no nausea/no fever/no vomiting/no abdominal distension/no diarrhea
weight-bearing as tolerated

## 2023-09-27 NOTE — CONSULT NOTE ADULT - SUBJECTIVE AND OBJECTIVE BOX
Chief Complaint:  Patient is a 73y old  Male who presents with a chief complaint of abdominal pain (2019 10:05)    Colitis  Hypothyroid  History of left hip replacement     HPI:  73M with hypothyroidism and ulcerative colitis who presents with abdominal pain.  Symptoms initially started in October with diarrhea and was prescribed steroids by GI.  Had a colonoscopy that showed colitis.  In December, patient was then diagnosed with Cdiff and was prescribed 14 days of oral vancomycin (finished Hermelinda day).  Did not have any abdominal pain at that time.  The diarrhea would be loose and watery with intermittent bleeding.  Then patient started having LLQ pain starting New Years Yolanda (about 8-9 days ago).  No radiation.  Worse with eating but better with lying down.  Pain also abates after he defecates and moves his bowels.  Associated with decreased appetite.  Denied any fevers.  Patient spoke to GI again and was prescribed oral vancomycin again on Saturday, .  Started to have some nausea but no vomiting.   Pain did not improve the next couple of days, went to GI today, and was told to come to the ED for further evaluation and treatment.  However,  patient did admit that his pain is improving starting today.  In the ED, CT showed pancolitis.  Patient being admitted for colitis, possible Cdiff pancolitis. (2019 21:22)      penicillin (Swelling)      lactated ringers. 1000 milliLiter(s) IV Continuous <Continuous>  levothyroxine 75 MICROGram(s) Oral daily  vancomycin    Solution 125 milliGRAM(s) Oral every 6 hours  zolpidem 5 milliGRAM(s) Oral at bedtime PRN        FAMILY HISTORY:  No pertinent family history in first degree relatives        Review of Systems:    General:  No wt loss, fevers, chills, night sweats,fatigue,   Eyes:  Good vision, no reported pain  ENT:  No sore throat, pain, runny nose, dysphagia  CV:  No pain, palpitatioins, hypo/hypertension  Resp:  No dyspnea, cough, tachypnea, wheezing  :  No pain, bleeding, incontinence, nocturia  Muscle:  No pain, weakness  Neuro:  No weakness, tingling, memory problems  Psych:  No fatigue, insomnia, mood problems, depression  Endocrine:  No polyuria, polydypsia, cold/heat intolerance  Heme:  No petechiae, ecchymosis, easy bruisability  Skin:  No rash, tattoos, scars, edema    Relevant Family History:       Relevant Social History:       Physical Exam:    Vital Signs:  Vital Signs Last 24 Hrs  T(C): 36.5 (2019 09:21), Max: 36.8 (2019 05:48)  T(F): 97.7 (2019 09:21), Max: 98.3 (2019 05:48)  HR: 98 (2019 09:21) (98 - 112)  BP: 117/71 (2019 09:21) (117/71 - 153/90)  BP(mean): --  RR: 18 (2019 09:21) (16 - 20)  SpO2: 91% (2019 05:48) (91% - 97%)  Daily Height in cm: 170.18 (2019 16:06)    Daily Weight in k.3 (2019 05:48)    General:  Appears stated age, well-groomed, well-nourished, no distress  HEENT:  NC/AT,  conjunctivae clear and pink, no thyromegaly, nodules, adenopathy, no JVD  Chest:  Full & symmetric excursion, no increased effort, breath sounds clear  Cardiovascular:  Regular rhythm, S1, S2, no murmur/rub/S3/S4, no abdominal bruit, no edema  Abdomen:  Soft, non-tender, non-distended, normoactive bowel sounds,  no masses ,no hepatosplenomeagaly, no signs of chronic liver disease  Extremities:  no cyanosis,clubbing or edema  Skin:  No rash/erythema/ecchymoses/petechiae/wounds/abscess/warm/dry  Neuro/Psych:  Alert, oriented, no asterixis, no tremor, no encephalopathy    Laboratory:                            11.8   11.35 )-----------( 110      ( 2019 08:02 )             36.8         136  |  100  |  5<L>  ----------------------------<  100<H>  3.4<L>   |  24  |  0.92    Ca    8.2<L>      2019 08:02  Phos  3.3     -  Mg     1.4     -    TPro  6.2  /  Alb  2.3<L>  /  TBili  0.4  /  DBili  x   /  AST  13  /  ALT  15  /  AlkPhos  44  01-09    LIVER FUNCTIONS - ( 2019 08:02 )  Alb: 2.3 g/dL / Pro: 6.2 g/dL / ALK PHOS: 44 U/L / ALT: 15 U/L DA / AST: 13 U/L / GGT: x           PT/INR - ( 2019 17:14 )   PT: 14.7 sec;   INR: 1.34 ratio         PTT - ( 2019 17:14 )  PTT:34.9 sec  Urinalysis Basic - ( 2019 21:55 )    Color: Yellow / Appearance: Clear / S.005 / pH: x  Gluc: x / Ketone: Moderate  / Bili: Negative / Urobili: Negative mg/dL   Blood: x / Protein: 15 mg/dL / Nitrite: Negative   Leuk Esterase: Negative / RBC: x / WBC x   Sq Epi: x / Non Sq Epi: x / Bacteria: Occasional      Amylase Serum--      Lipase serum50       Ammonia--    Imaging:
HPI:  73M with hypothyroidism and UC diagnosed 2 years ago followed with Dr Yaakov Chavira who   presents with severe abdominal pain and diarrhea.  Symptoms initially started in October 2018  with diarrhea and was prescribed steroids by GI.  Had a colonoscopy that showed colitis.  Pt states   that in December, he shad profuse diarrhea and was diagnosed with Cdiff and was prescribed 14   days of oral vancomycin (finished Hermelinda day). 3-4 days after he completed course of oral vanc  he started having diarrhea and worsening abdominal pain. Denies fever chills n/v rash CP SOB. No   recent antibiotic use bu pt had been on steroids. As his diarrhea and abdominal pain was not   better he was asked to come to the hospital to be evaluated, Ct AP showed pancolitis.    Infectious Disease consult was called to evaluate pt.    Past Medical & Surgical Hx:  PAST MEDICAL & SURGICAL HISTORY:  Colitis  Hypothyroid  History of left hip replacement      Social History--  EtOH: denies   Tobacco: denies   Drug Use: denies   Lives with girlfriend  Retired     FAMILY HISTORY:  No pertinent family history in first degree relatives      Allergies  penicillin (Swelling)    Home Medications:  Ambien 5 mg oral tablet: 1 tab(s) orally once a day (at bedtime) (08 Jan 2019 20:40)  levothyroxine: 1  orally once a day (08 Jan 2019 20:40)  vancomycin 125 mg oral capsule: 1 cap(s) orally every 6 hours (08 Jan 2019 20:40)    Current Inpatient Medications :    ANTIBIOTICS:   vancomycin    Solution 125 milliGRAM(s) Oral every 6 hours    OTHER RELEVANT MEDICATIONS :  cholestyramine Powder (Sugar-Free) 4 Gram(s) Oral daily  lactated ringers. 1000 milliLiter(s) IV Continuous <Continuous>  levothyroxine 75 MICROGram(s) Oral daily  mesalamine DR Capsule 1200 milliGRAM(s) Oral three times a day  zolpidem 5 milliGRAM(s) Oral at bedtime PRN      ROS:  CONSTITUTIONAL:  Negative fever or chills  EYES:  Negative  blurry vision or double vision  CARDIOVASCULAR:  Negative for chest pain or palpitations  RESPIRATORY:  Negative for cough, wheezing, or SOB   GASTROINTESTINAL:  Negative for nausea, vomiting, constipation,+ abdominal pain diarrhea  GENITOURINARY:  Negative frequency, urgency , dysuria or hematuria   NEUROLOGIC:  No headache, confusion, dizziness, lightheadedness  All other systems were reviewed and are negative    I&O's Detail    08 Jan 2019 07:01  -  09 Jan 2019 07:00  --------------------------------------------------------  IN:  Total IN: 0 mL    OUT:    Voided: 400 mL  Total OUT: 400 mL    Total NET: -400 mL    Physical Exam:  Vital Signs Last 24 Hrs  T(C): 36.5 (09 Jan 2019 09:21), Max: 36.8 (09 Jan 2019 05:48)  T(F): 97.7 (09 Jan 2019 09:21), Max: 98.3 (09 Jan 2019 05:48)  HR: 98 (09 Jan 2019 09:21) (98 - 112)  BP: 117/71 (09 Jan 2019 09:21) (117/71 - 153/90)  BP(mean): --  RR: 18 (09 Jan 2019 09:21) (16 - 20)  SpO2: 91% (09 Jan 2019 05:48) (91% - 97%)  Height (cm): 170.18 (01-08 @ 16:06)  Weight (kg): 75.3 (01-08 @ 16:06)  BMI (kg/m2): 26 (01-08 @ 16:06)  BSA (m2): 1.87 (01-08 @ 16:06)    General:  no acute distress  Eyes: sclera anicteric, pupils equal and reactive to light  ENMT: buccal mucosa moist, pharynx not injected  Neck: supple, trachea midline  Lungs: clear, no wheeze/rhonchi  Cardiovascular: regular rate and rhythm, S1 S2  Abdomen: soft, mild LLQ tenderness, bowel sounds normal  Neurological:  alert and oriented x3, Cranial Nerves II-XII grossly intact  Skin:no increased ecchymosis/petechiae/purpura  Lymph Nodes: no palpable cervical/supraclavicular lymph nodes enlargements  Extremities: no cyanosis/clubbing/edema    Labs:                         11.8   11.35 )-----------( 110      ( 09 Jan 2019 08:02 )             36.8   01-09    136  |  100  |  5<L>  ----------------------------<  100<H>  3.4<L>   |  24  |  0.92    Ca    8.2<L>      09 Jan 2019 08:02  Phos  3.3     01-09  Mg     1.4     01-09    TPro  6.2  /  Alb  2.3<L>  /  TBili  0.4  /  DBili  x   /  AST  13  /  ALT  15  /  AlkPhos  44  01-09    RECENT CULTURES:  PENDING      RADIOLOGY & ADDITIONAL STUDIES:    EXAM:  CT ABDOMEN AND PELVIS OC IC                                  PROCEDURE DATE:  01/08/2019          INTERPRETATION:  .    CLINICAL INFORMATION: Left lower quadrant abdominal pain.    TECHNIQUE: Helical axial images were obtained from the domesof the   diaphragm through the pubic symphysis. 95 mls of Omnipaque-350  was   administered intravenously without complication and 5 mls were discarded.   Oral contrast was also administered. Coronal and Sagittal reconstructions   were obtained from the source data.     COMPARISON: Prior CT examination of the abdomen and pelvis from   10/23/2018.     FINDINGS: There are atherosclerotic calcifications of the imaged coronary   arteries, aorta, and branch vessels. The imaged portions of the aorta are   normal in caliber. The lung bases are unremarkable.    There is hepatic steatosis. The gallbladder, biliary tree, pancreas,   spleen, and adrenal glands appear unremarkable.    There is a tiny low-attenuation focus in the left kidney which appears  unchanged. A right renal cyst appears unchanged off the lower pole. There   is no hydroureteronephrosis bilaterally. The urinary bladder appears   unremarkable.    There are multiple scattered nonspecific subcentimeter retroperitoneal   and mesenteric lymph nodes.    There is no bowel obstruction or abdominal ascites. There is diffuse   pancolonic wall thickening with adjacent fat stranding. There is also   involvement of the rectum. Colonic diverticulosis is noted. The appendix   is unremarkable.    A punctate calcification is noted within the left-sided the prostate   gland. There are moderate-sized bilateral fat containing inguinal hernias.    Multilevel degenerative changes are noted within the imaged potions of   the spine. A left-sided total hip arthroplasty is noted.    IMPRESSION: Pancolitis.    Assessment :   73M with hypothyroidism and UC recent Cdiff admitted with abdominal pain and diarrhea  found to have pancolitis  Has not been on systemic antibiotics but was on steroids.  Rule out UC flare  Rule out recurrent Cdiff      Plan :   Cont empiric po vancomycin  Fu Cdiff PCR  Get GI Stool PCR  Serial Abdominal exams  IVF    Continue with present regime .  Approptiate use of antibiotics and adverse effects reviewed.      I have discussed the above plan of care with patient/family in detail. They expressed understanding of the treatment plan . Risks, benefits and alternatives discussed in detail. I have asked if they have any questions or concerns and appropriately addressed them to the best of my ability .      > 45 minutes spent in direct patient care reviewing  the notes, lab data/ imaging , discussion with multidisciplinary team. All questions were addressed and answered to the best of my capacity .    Thank you for allowing me to participate in the care of your patient .      Nicholas Hernandez MD  Infectious Disease  [phone]
24

## 2023-10-25 NOTE — PATIENT PROFILE ADULT - NSPROGENSOURCEINFO_GEN_A_NUR
Patient arrives to the ED via [facility] c/o left hand pain that radiates to her chest that began 3 days ago. Patient explains that she got stitches two months ago to the left bob aspect of the hand and three days ago she began to have a throbbing pain in the hand that now radiates up her arm into her chest. Patient also endorses nausea, lightheadedness, and pain with deep breathing.    patient

## 2023-10-28 ENCOUNTER — INPATIENT (INPATIENT)
Facility: HOSPITAL | Age: 78
LOS: 2 days | Discharge: HOME | DRG: 321 | End: 2023-10-31
Attending: INTERNAL MEDICINE | Admitting: INTERNAL MEDICINE
Payer: MEDICARE

## 2023-10-28 VITALS
RESPIRATION RATE: 16 BRPM | HEART RATE: 117 BPM | DIASTOLIC BLOOD PRESSURE: 89 MMHG | SYSTOLIC BLOOD PRESSURE: 170 MMHG | HEIGHT: 67 IN | TEMPERATURE: 98 F | OXYGEN SATURATION: 97 % | WEIGHT: 164.91 LBS

## 2023-10-28 DIAGNOSIS — Z96.642 PRESENCE OF LEFT ARTIFICIAL HIP JOINT: Chronic | ICD-10-CM

## 2023-10-28 DIAGNOSIS — I21.3 ST ELEVATION (STEMI) MYOCARDIAL INFARCTION OF UNSPECIFIED SITE: ICD-10-CM

## 2023-10-28 DIAGNOSIS — E78.5 HYPERLIPIDEMIA, UNSPECIFIED: ICD-10-CM

## 2023-10-28 DIAGNOSIS — Z29.9 ENCOUNTER FOR PROPHYLACTIC MEASURES, UNSPECIFIED: ICD-10-CM

## 2023-10-28 DIAGNOSIS — I10 ESSENTIAL (PRIMARY) HYPERTENSION: ICD-10-CM

## 2023-10-28 DIAGNOSIS — E03.9 HYPOTHYROIDISM, UNSPECIFIED: ICD-10-CM

## 2023-10-28 LAB
ALBUMIN SERPL ELPH-MCNC: 4.1 G/DL — SIGNIFICANT CHANGE UP (ref 3.3–5)
ALBUMIN SERPL ELPH-MCNC: 4.1 G/DL — SIGNIFICANT CHANGE UP (ref 3.3–5)
ALP SERPL-CCNC: 59 U/L — SIGNIFICANT CHANGE UP (ref 40–120)
ALP SERPL-CCNC: 59 U/L — SIGNIFICANT CHANGE UP (ref 40–120)
ALT FLD-CCNC: 28 U/L — SIGNIFICANT CHANGE UP (ref 12–78)
ALT FLD-CCNC: 28 U/L — SIGNIFICANT CHANGE UP (ref 12–78)
ANION GAP SERPL CALC-SCNC: 8 MMOL/L — SIGNIFICANT CHANGE UP (ref 5–17)
ANION GAP SERPL CALC-SCNC: 8 MMOL/L — SIGNIFICANT CHANGE UP (ref 5–17)
APTT BLD: 33.4 SEC — SIGNIFICANT CHANGE UP (ref 24.5–35.6)
APTT BLD: 33.4 SEC — SIGNIFICANT CHANGE UP (ref 24.5–35.6)
AST SERPL-CCNC: 18 U/L — SIGNIFICANT CHANGE UP (ref 15–37)
AST SERPL-CCNC: 18 U/L — SIGNIFICANT CHANGE UP (ref 15–37)
BASOPHILS # BLD AUTO: 0.04 K/UL — SIGNIFICANT CHANGE UP (ref 0–0.2)
BASOPHILS # BLD AUTO: 0.04 K/UL — SIGNIFICANT CHANGE UP (ref 0–0.2)
BASOPHILS NFR BLD AUTO: 0.3 % — SIGNIFICANT CHANGE UP (ref 0–2)
BASOPHILS NFR BLD AUTO: 0.3 % — SIGNIFICANT CHANGE UP (ref 0–2)
BILIRUB SERPL-MCNC: 0.5 MG/DL — SIGNIFICANT CHANGE UP (ref 0.2–1.2)
BILIRUB SERPL-MCNC: 0.5 MG/DL — SIGNIFICANT CHANGE UP (ref 0.2–1.2)
BUN SERPL-MCNC: 19 MG/DL — SIGNIFICANT CHANGE UP (ref 7–23)
BUN SERPL-MCNC: 19 MG/DL — SIGNIFICANT CHANGE UP (ref 7–23)
CALCIUM SERPL-MCNC: 9.7 MG/DL — SIGNIFICANT CHANGE UP (ref 8.5–10.1)
CALCIUM SERPL-MCNC: 9.7 MG/DL — SIGNIFICANT CHANGE UP (ref 8.5–10.1)
CHLORIDE SERPL-SCNC: 104 MMOL/L — SIGNIFICANT CHANGE UP (ref 96–108)
CHLORIDE SERPL-SCNC: 104 MMOL/L — SIGNIFICANT CHANGE UP (ref 96–108)
CO2 SERPL-SCNC: 28 MMOL/L — SIGNIFICANT CHANGE UP (ref 22–31)
CO2 SERPL-SCNC: 28 MMOL/L — SIGNIFICANT CHANGE UP (ref 22–31)
CREAT SERPL-MCNC: 1.1 MG/DL — SIGNIFICANT CHANGE UP (ref 0.5–1.3)
CREAT SERPL-MCNC: 1.1 MG/DL — SIGNIFICANT CHANGE UP (ref 0.5–1.3)
EGFR: 70 ML/MIN/1.73M2 — SIGNIFICANT CHANGE UP
EGFR: 70 ML/MIN/1.73M2 — SIGNIFICANT CHANGE UP
EOSINOPHIL # BLD AUTO: 0.01 K/UL — SIGNIFICANT CHANGE UP (ref 0–0.5)
EOSINOPHIL # BLD AUTO: 0.01 K/UL — SIGNIFICANT CHANGE UP (ref 0–0.5)
EOSINOPHIL NFR BLD AUTO: 0.1 % — SIGNIFICANT CHANGE UP (ref 0–6)
EOSINOPHIL NFR BLD AUTO: 0.1 % — SIGNIFICANT CHANGE UP (ref 0–6)
GLUCOSE SERPL-MCNC: 135 MG/DL — HIGH (ref 70–99)
GLUCOSE SERPL-MCNC: 135 MG/DL — HIGH (ref 70–99)
HCT VFR BLD CALC: 48 % — SIGNIFICANT CHANGE UP (ref 39–50)
HCT VFR BLD CALC: 48 % — SIGNIFICANT CHANGE UP (ref 39–50)
HGB BLD-MCNC: 15.4 G/DL — SIGNIFICANT CHANGE UP (ref 13–17)
HGB BLD-MCNC: 15.4 G/DL — SIGNIFICANT CHANGE UP (ref 13–17)
IMM GRANULOCYTES NFR BLD AUTO: 0.6 % — SIGNIFICANT CHANGE UP (ref 0–0.9)
IMM GRANULOCYTES NFR BLD AUTO: 0.6 % — SIGNIFICANT CHANGE UP (ref 0–0.9)
INR BLD: 1.02 RATIO — SIGNIFICANT CHANGE UP (ref 0.85–1.18)
INR BLD: 1.02 RATIO — SIGNIFICANT CHANGE UP (ref 0.85–1.18)
LYMPHOCYTES # BLD AUTO: 0.97 K/UL — LOW (ref 1–3.3)
LYMPHOCYTES # BLD AUTO: 0.97 K/UL — LOW (ref 1–3.3)
LYMPHOCYTES # BLD AUTO: 8.4 % — LOW (ref 13–44)
LYMPHOCYTES # BLD AUTO: 8.4 % — LOW (ref 13–44)
MAGNESIUM SERPL-MCNC: 1.7 MG/DL — SIGNIFICANT CHANGE UP (ref 1.6–2.6)
MAGNESIUM SERPL-MCNC: 1.7 MG/DL — SIGNIFICANT CHANGE UP (ref 1.6–2.6)
MCHC RBC-ENTMCNC: 27.4 PG — SIGNIFICANT CHANGE UP (ref 27–34)
MCHC RBC-ENTMCNC: 27.4 PG — SIGNIFICANT CHANGE UP (ref 27–34)
MCHC RBC-ENTMCNC: 32.1 GM/DL — SIGNIFICANT CHANGE UP (ref 32–36)
MCHC RBC-ENTMCNC: 32.1 GM/DL — SIGNIFICANT CHANGE UP (ref 32–36)
MCV RBC AUTO: 85.3 FL — SIGNIFICANT CHANGE UP (ref 80–100)
MCV RBC AUTO: 85.3 FL — SIGNIFICANT CHANGE UP (ref 80–100)
MONOCYTES # BLD AUTO: 0.99 K/UL — HIGH (ref 0–0.9)
MONOCYTES # BLD AUTO: 0.99 K/UL — HIGH (ref 0–0.9)
MONOCYTES NFR BLD AUTO: 8.6 % — SIGNIFICANT CHANGE UP (ref 2–14)
MONOCYTES NFR BLD AUTO: 8.6 % — SIGNIFICANT CHANGE UP (ref 2–14)
NEUTROPHILS # BLD AUTO: 9.42 K/UL — HIGH (ref 1.8–7.4)
NEUTROPHILS # BLD AUTO: 9.42 K/UL — HIGH (ref 1.8–7.4)
NEUTROPHILS NFR BLD AUTO: 82 % — HIGH (ref 43–77)
NEUTROPHILS NFR BLD AUTO: 82 % — HIGH (ref 43–77)
NRBC # BLD: 0 /100 WBCS — SIGNIFICANT CHANGE UP (ref 0–0)
NRBC # BLD: 0 /100 WBCS — SIGNIFICANT CHANGE UP (ref 0–0)
NT-PROBNP SERPL-SCNC: 59 PG/ML — SIGNIFICANT CHANGE UP (ref 0–450)
NT-PROBNP SERPL-SCNC: 59 PG/ML — SIGNIFICANT CHANGE UP (ref 0–450)
PLATELET # BLD AUTO: 259 K/UL — SIGNIFICANT CHANGE UP (ref 150–400)
PLATELET # BLD AUTO: 259 K/UL — SIGNIFICANT CHANGE UP (ref 150–400)
POTASSIUM SERPL-MCNC: 4.4 MMOL/L — SIGNIFICANT CHANGE UP (ref 3.5–5.3)
POTASSIUM SERPL-MCNC: 4.4 MMOL/L — SIGNIFICANT CHANGE UP (ref 3.5–5.3)
POTASSIUM SERPL-SCNC: 4.4 MMOL/L — SIGNIFICANT CHANGE UP (ref 3.5–5.3)
POTASSIUM SERPL-SCNC: 4.4 MMOL/L — SIGNIFICANT CHANGE UP (ref 3.5–5.3)
PROT SERPL-MCNC: 7.7 G/DL — SIGNIFICANT CHANGE UP (ref 6–8.3)
PROT SERPL-MCNC: 7.7 G/DL — SIGNIFICANT CHANGE UP (ref 6–8.3)
PROTHROM AB SERPL-ACNC: 11.9 SEC — SIGNIFICANT CHANGE UP (ref 9.5–13)
PROTHROM AB SERPL-ACNC: 11.9 SEC — SIGNIFICANT CHANGE UP (ref 9.5–13)
RBC # BLD: 5.63 M/UL — SIGNIFICANT CHANGE UP (ref 4.2–5.8)
RBC # BLD: 5.63 M/UL — SIGNIFICANT CHANGE UP (ref 4.2–5.8)
RBC # FLD: 13.2 % — SIGNIFICANT CHANGE UP (ref 10.3–14.5)
RBC # FLD: 13.2 % — SIGNIFICANT CHANGE UP (ref 10.3–14.5)
SODIUM SERPL-SCNC: 140 MMOL/L — SIGNIFICANT CHANGE UP (ref 135–145)
SODIUM SERPL-SCNC: 140 MMOL/L — SIGNIFICANT CHANGE UP (ref 135–145)
TROPONIN I, HIGH SENSITIVITY RESULT: 3643.6 NG/L — HIGH
TROPONIN I, HIGH SENSITIVITY RESULT: 3643.6 NG/L — HIGH
TROPONIN I, HIGH SENSITIVITY RESULT: 96.2 NG/L — HIGH
TROPONIN I, HIGH SENSITIVITY RESULT: 96.2 NG/L — HIGH
TSH SERPL-MCNC: 0.41 UIU/ML — SIGNIFICANT CHANGE UP (ref 0.36–3.74)
TSH SERPL-MCNC: 0.41 UIU/ML — SIGNIFICANT CHANGE UP (ref 0.36–3.74)
WBC # BLD: 11.5 K/UL — HIGH (ref 3.8–10.5)
WBC # BLD: 11.5 K/UL — HIGH (ref 3.8–10.5)
WBC # FLD AUTO: 11.5 K/UL — HIGH (ref 3.8–10.5)
WBC # FLD AUTO: 11.5 K/UL — HIGH (ref 3.8–10.5)

## 2023-10-28 PROCEDURE — 93010 ELECTROCARDIOGRAM REPORT: CPT

## 2023-10-28 PROCEDURE — 71045 X-RAY EXAM CHEST 1 VIEW: CPT | Mod: 26

## 2023-10-28 PROCEDURE — 99152 MOD SED SAME PHYS/QHP 5/>YRS: CPT

## 2023-10-28 PROCEDURE — 92941 PRQ TRLML REVSC TOT OCCL AMI: CPT | Mod: LD

## 2023-10-28 PROCEDURE — 99291 CRITICAL CARE FIRST HOUR: CPT

## 2023-10-28 PROCEDURE — 92978 ENDOLUMINL IVUS OCT C 1ST: CPT | Mod: 26,LD

## 2023-10-28 PROCEDURE — 93458 L HRT ARTERY/VENTRICLE ANGIO: CPT | Mod: 26,59

## 2023-10-28 RX ORDER — ATORVASTATIN CALCIUM 80 MG/1
80 TABLET, FILM COATED ORAL AT BEDTIME
Refills: 0 | Status: DISCONTINUED | OUTPATIENT
Start: 2023-10-29 | End: 2023-10-31

## 2023-10-28 RX ORDER — LEVOTHYROXINE SODIUM 125 MCG
75 TABLET ORAL DAILY
Refills: 0 | Status: DISCONTINUED | OUTPATIENT
Start: 2023-10-28 | End: 2023-10-29

## 2023-10-28 RX ORDER — ZOLPIDEM TARTRATE 10 MG/1
5 TABLET ORAL AT BEDTIME
Refills: 0 | Status: DISCONTINUED | OUTPATIENT
Start: 2023-10-28 | End: 2023-10-31

## 2023-10-28 RX ORDER — ASPIRIN/CALCIUM CARB/MAGNESIUM 324 MG
81 TABLET ORAL DAILY
Refills: 0 | Status: DISCONTINUED | OUTPATIENT
Start: 2023-10-29 | End: 2023-10-29

## 2023-10-28 RX ORDER — TICAGRELOR 90 MG/1
90 TABLET ORAL EVERY 12 HOURS
Refills: 0 | Status: DISCONTINUED | OUTPATIENT
Start: 2023-10-28 | End: 2023-10-30

## 2023-10-28 RX ORDER — MESALAMINE 400 MG
400 TABLET, DELAYED RELEASE (ENTERIC COATED) ORAL THREE TIMES A DAY
Refills: 0 | Status: DISCONTINUED | OUTPATIENT
Start: 2023-10-28 | End: 2023-10-28

## 2023-10-28 RX ORDER — MESALAMINE 400 MG
1200 TABLET, DELAYED RELEASE (ENTERIC COATED) ORAL THREE TIMES A DAY
Refills: 0 | Status: DISCONTINUED | OUTPATIENT
Start: 2023-10-28 | End: 2023-10-29

## 2023-10-28 RX ORDER — ASPIRIN/CALCIUM CARB/MAGNESIUM 324 MG
325 TABLET ORAL ONCE
Refills: 0 | Status: COMPLETED | OUTPATIENT
Start: 2023-10-28 | End: 2023-10-28

## 2023-10-28 RX ORDER — TICAGRELOR 90 MG/1
180 TABLET ORAL ONCE
Refills: 0 | Status: COMPLETED | OUTPATIENT
Start: 2023-10-28 | End: 2023-10-28

## 2023-10-28 RX ORDER — MESALAMINE 400 MG
1000 TABLET, DELAYED RELEASE (ENTERIC COATED) ORAL AT BEDTIME
Refills: 0 | Status: DISCONTINUED | OUTPATIENT
Start: 2023-10-28 | End: 2023-10-29

## 2023-10-28 RX ORDER — HEPARIN SODIUM 5000 [USP'U]/ML
5000 INJECTION INTRAVENOUS; SUBCUTANEOUS EVERY 8 HOURS
Refills: 0 | Status: DISCONTINUED | OUTPATIENT
Start: 2023-10-28 | End: 2023-10-29

## 2023-10-28 RX ORDER — CHLORHEXIDINE GLUCONATE 213 G/1000ML
1 SOLUTION TOPICAL
Refills: 0 | Status: DISCONTINUED | OUTPATIENT
Start: 2023-10-28 | End: 2023-10-31

## 2023-10-28 RX ORDER — CHOLESTYRAMINE 4 G/9G
4 POWDER, FOR SUSPENSION ORAL DAILY
Refills: 0 | Status: DISCONTINUED | OUTPATIENT
Start: 2023-10-28 | End: 2023-10-29

## 2023-10-28 RX ORDER — MORPHINE SULFATE 50 MG/1
4 CAPSULE, EXTENDED RELEASE ORAL ONCE
Refills: 0 | Status: DISCONTINUED | OUTPATIENT
Start: 2023-10-28 | End: 2023-10-28

## 2023-10-28 RX ORDER — HEPARIN SODIUM 5000 [USP'U]/ML
5000 INJECTION INTRAVENOUS; SUBCUTANEOUS ONCE
Refills: 0 | Status: COMPLETED | OUTPATIENT
Start: 2023-10-28 | End: 2023-10-28

## 2023-10-28 RX ADMIN — TICAGRELOR 180 MILLIGRAM(S): 90 TABLET ORAL at 15:29

## 2023-10-28 RX ADMIN — HEPARIN SODIUM 5000 UNIT(S): 5000 INJECTION INTRAVENOUS; SUBCUTANEOUS at 15:35

## 2023-10-28 RX ADMIN — TICAGRELOR 90 MILLIGRAM(S): 90 TABLET ORAL at 21:03

## 2023-10-28 RX ADMIN — HEPARIN SODIUM 5000 UNIT(S): 5000 INJECTION INTRAVENOUS; SUBCUTANEOUS at 21:03

## 2023-10-28 RX ADMIN — Medication 325 MILLIGRAM(S): at 15:30

## 2023-10-28 RX ADMIN — MORPHINE SULFATE 4 MILLIGRAM(S): 50 CAPSULE, EXTENDED RELEASE ORAL at 15:28

## 2023-10-28 NOTE — ED PROVIDER NOTE - MDM PATIENT STATEMENT FOR ADDL TREATMENT
CHIEF COMPLAINT:  Chief Complaint   Patient presents with    Left Little Finger - Follow-up       SUBJECTIVE:  Marysol Georges is a 66y o  year old RHD male who presents today for a 6 week follow up for his Left mallet finger of the small finger He has been treating in a hyper extension splint that was fabricated in OT  Patient states that he was at work when his boss accidentally dropped tire on his finger  He states he has not been able to straighten the finger fully since that time  Injury occurred on 08/11/2020  PAST MEDICAL HISTORY:  History reviewed  No pertinent past medical history  PAST SURGICAL HISTORY:  History reviewed  No pertinent surgical history  FAMILY HISTORY:  History reviewed  No pertinent family history  SOCIAL HISTORY:  Social History     Tobacco Use    Smoking status: Current Every Day Smoker     Packs/day: 2 00    Smokeless tobacco: Never Used   Substance Use Topics    Alcohol use: Not Currently    Drug use: Never       MEDICATIONS:    Current Outpatient Medications:     Naproxen Sodium (ALEVE PO), Take by mouth, Disp: , Rfl:     ALLERGIES:  No Known Allergies    REVIEW OF SYSTEMS:  Review of Systems   Constitutional: Negative for chills, fever and unexpected weight change  HENT: Negative for hearing loss, nosebleeds and sore throat  Eyes: Negative for pain, redness and visual disturbance  Respiratory: Negative for cough, shortness of breath and wheezing  Cardiovascular: Negative for chest pain, palpitations and leg swelling  Gastrointestinal: Negative for abdominal pain, nausea and vomiting  Endocrine: Negative for polydipsia and polyuria  Genitourinary: Negative for dysuria and hematuria  Skin: Negative for rash and wound  Neurological: Negative for dizziness, light-headedness and headaches  Psychiatric/Behavioral: Negative for decreased concentration, dysphoric mood and suicidal ideas  The patient is not nervous/anxious          VITALS:  Vitals: 09/29/20 0811   BP: 104/71   Pulse: 63   Temp: 97 5 °F (36 4 °C)       LABS:  HgA1c: No results found for: HGBA1C  BMP: No results found for: GLUCOSE, CALCIUM, NA, K, CO2, CL, BUN, CREATININE    _____________________________________________________  PHYSICAL EXAMINATION:  General: well developed and well nourished, alert, oriented times 3 and appears comfortable  Psychiatric: Normal  HEENT: Trachea Midline, No torticollis  Pulmonary: No audible wheezing or strider  Cardiovascular: No discernable arrhythmia   Skin: No masses, erythema, lacerations, fluctation, ulcerations  Neurovascular: Sensation Intact to the Median, Ulnar, Radial Nerve, Motor Intact to the Median, Ulnar, Radial Nerve and Pulses Intact    MUSCULOSKELETAL EXAMINATION:  Left small finger:   Ecchymosis on the dorsum of the finger  About 10 degree Droop of the DIP  Good cap refill  ___________________________________________________  STUDIES REVIEWED:  No studies reviewed  PROCEDURES PERFORMED:  Procedures  No Procedures performed today    _____________________________________________________  ASSESSMENT/PLAN:      Diagnoses and all orders for this visit:    Mallet finger of small finger of left hand    - On exam, the patient continues to have a slight droop  Expressed that it's imperative to wear the splint in hyperextension 24/7 to allow it to heal and avoid surgery  - If any questions contact OT to have the splint looked at    Follow Up:  Return in about 6 weeks (around 11/10/2020) for Left small finger  Work/school status:  No restrictions    To Do Next Visit:  Re-evaluation of current issue    General Discussions:  Mallet Finger: The anatomy and physiology of a mallet finger was discussed with the patient today  Typically, the extensor tendon is torn off of the dorsal aspect of the distal phalanx  This results in a flexed posture of the distal interphalangeal joint, with incomplete extension (ie  A drooped finger)    Typically this is treated through conservative measures  An extension block splint is worn over the distal interphalangeal joint for 6-8 weeks continuously, followed by 4-6 weeks of nocturnal use  After healing, there is typically a small flexion deformity at the distal interphalangeal joint  Surgery does not typically change these results          Scribe Attestation    I,:   Yogi Tinsley am acting as a scribe while in the presence of the attending physician :        I,:   Phu Head MD personally performed the services described in this documentation    as scribed in my presence : Patient with one or more new problems requiring additional work-up/treatment.

## 2023-10-28 NOTE — CONSULT NOTE ADULT - NS PANP COMMENT GEN_ALL_CORE FT
late entry, pt seen and examined yesterday in ICU post-cath    78 yo man with Hx htn, HLD, UC presenting with chest pain, found to have inf JESSI, s/p cath with DWAINE x 3 to LAD, EF 40%, plan for staged PCI to LCx 10/30.  Post cath no chest pain, dyspnea.    mentating well  STEMI s/p 3 DWAINE to LAD, plan for LCx PCI on 10/30  ASA, brilinta bid   high dose statin  check echo  stable from respiratory standpoint  normal renal function  PO diet, continue home mesalamine  no infectious concerns

## 2023-10-28 NOTE — CONSULT NOTE ADULT - SUBJECTIVE AND OBJECTIVE BOX
Patient is a 77y old  Male who presents with a chief complaint of chest pain.    BRIEF HOSPITAL COURSE: 77M with HTN. HLD, hypothyroidism, ulcerative colitis presenting with acute onset nonradiating, substernal chest pain with associated SOB and diaphoresis that began approximately 30-45 minutes prior to arrival. EKG significant for inferior JESSI with subtle ST depressions in V1 and V2. Labs notable for troponin 96.2, WBC 11.50. Now s/p 3 DWAINE to LAD by Dr Redding with EF 40% on LVgram. Admit to ICU for post-C management of anterior STEMI with anticipated staged PCI on Monday.        PAST MEDICAL & SURGICAL HISTORY:  Hypothyroid  Colitis  History of left hip replacement          Review of Systems:  CONSTITUTIONAL: No fever, chills, or fatigue  EYES: No eye pain, visual disturbances, or discharge  ENMT:  No difficulty hearing, tinnitus, vertigo; No sinus or throat pain  NECK: No pain or stiffness  RESPIRATORY: No cough, wheezing, chills or hemoptysis; No shortness of breath  CARDIOVASCULAR: No chest pain, palpitations, dizziness, or leg swelling  GASTROINTESTINAL: No abdominal or epigastric pain. No nausea, vomiting, or hematemesis; No diarrhea or constipation. No melena or hematochezia.  GENITOURINARY: No dysuria, frequency, hematuria, or incontinence  NEUROLOGICAL: No headaches, memory loss, loss of strength, numbness, or tremors  SKIN: No itching, burning, rashes, or lesions   MUSCULOSKELETAL: No joint pain or swelling; No muscle, back, or extremity pain  PSYCHIATRIC: No depression, anxiety, mood swings, or difficulty sleeping      Medications:                                  ICU Vital Signs Last 24 Hrs  T(C): 36.5 (28 Oct 2023 15:17), Max: 36.5 (28 Oct 2023 15:17)  T(F): 97.7 (28 Oct 2023 15:17), Max: 97.7 (28 Oct 2023 15:17)  HR: 82 (28 Oct 2023 16:50) (77 - 117)  BP: 142/71 (28 Oct 2023 16:50) (142/71 - 170/89)  BP(mean): 100 (28 Oct 2023 16:50) (100 - 103)  ABP: --  ABP(mean): --  RR: 16 (28 Oct 2023 16:50) (16 - 25)  SpO2: 97% (28 Oct 2023 16:50) (95% - 97%)    O2 Parameters below as of 28 Oct 2023 16:50  Patient On (Oxygen Delivery Method): room air                I&O's Detail        LABS:                        15.4   11.50 )-----------( 259      ( 28 Oct 2023 15:30 )             48.0     10-28    140  |  104  |  19  ----------------------------<  135<H>  4.4   |  28  |  1.10    Ca    9.7      28 Oct 2023 15:30  Mg     1.7     10-28    TPro  7.7  /  Alb  4.1  /  TBili  0.5  /  DBili  x   /  AST  18  /  ALT  28  /  AlkPhos  59  10-28          CAPILLARY BLOOD GLUCOSE        PT/INR - ( 28 Oct 2023 15:30 )   PT: 11.9 sec;   INR: 1.02 ratio         PTT - ( 28 Oct 2023 15:30 )  PTT:33.4 sec  Urinalysis Basic - ( 28 Oct 2023 15:30 )    Color: x / Appearance: x / SG: x / pH: x  Gluc: 135 mg/dL / Ketone: x  / Bili: x / Urobili: x   Blood: x / Protein: x / Nitrite: x   Leuk Esterase: x / RBC: x / WBC x   Sq Epi: x / Non Sq Epi: x / Bacteria: x      CULTURES:      Physical Examination:    General: No acute distress.  Alert, oriented, interactive, nonfocal, following simple commands and moving all extremities     HEENT: Pupils equal, reactive to light.  Symmetric.    PULM: Breathing comfortabley, good BS B/L with no Rales or Rhonchi, no significant sputum production    CVS: Regular rate and rhythm, no murmurs, rubs, or gallops    ABD: BS+ Soft, nondistended, nontender, no masses    EXT: No edema, nontender    SKIN: Warm and well perfused, no rashes noted.    RADIOLOGY: ***    CRITICAL CARE TIME SPENT: *** mins of time have been spent evaluating, reviewing all available lab and radiologic material, reviewing the EMR and treating this critically ill patient, who has complex medical problems and life threatening organ dysfunction. This also included speaking with the staff, consultants, and family.     Patient is a 77y old  Male who presents with a chief complaint of chest pain.    BRIEF HOSPITAL COURSE: 77M with HTN. HLD, hypothyroidism, ulcerative colitis presenting with acute onset nonradiating, substernal chest pain with associated SOB and diaphoresis that began approximately 30-45 minutes prior to arrival. EKG significant for inferior JESSI with subtle ST depressions in V1 and V2. Labs notable for troponin 96.2, WBC 11.50. Now s/p 3 DWAINE to LAD by Dr Redding with EF 40% on LVgram. Admit to ICU for post-C management of anterior STEMI with anticipated staged PCI on Monday.        PAST MEDICAL & SURGICAL HISTORY:  Hypothyroid  Colitis  History of left hip replacement          Review of Systems:  CONSTITUTIONAL: No fever, chills, or fatigue  EYES: No eye pain, visual disturbances, or discharge  ENMT:  No difficulty hearing, tinnitus, vertigo; No sinus or throat pain  NECK: No pain or stiffness  RESPIRATORY: No cough, wheezing, chills or hemoptysis; No shortness of breath  CARDIOVASCULAR: No chest pain, palpitations, dizziness, or leg swelling  GASTROINTESTINAL: No abdominal or epigastric pain. No nausea, vomiting, or hematemesis; No diarrhea or constipation. No melena or hematochezia.  GENITOURINARY: No dysuria, frequency, hematuria, or incontinence  NEUROLOGICAL: No headaches, memory loss, loss of strength, numbness, or tremors  SKIN: No itching, burning, rashes, or lesions   MUSCULOSKELETAL: No joint pain or swelling; No muscle, back, or extremity pain  PSYCHIATRIC: No depression, anxiety, mood swings, or difficulty sleeping      Medications:                                  ICU Vital Signs Last 24 Hrs  T(C): 36.5 (28 Oct 2023 15:17), Max: 36.5 (28 Oct 2023 15:17)  T(F): 97.7 (28 Oct 2023 15:17), Max: 97.7 (28 Oct 2023 15:17)  HR: 82 (28 Oct 2023 16:50) (77 - 117)  BP: 142/71 (28 Oct 2023 16:50) (142/71 - 170/89)  BP(mean): 100 (28 Oct 2023 16:50) (100 - 103)  ABP: --  ABP(mean): --  RR: 16 (28 Oct 2023 16:50) (16 - 25)  SpO2: 97% (28 Oct 2023 16:50) (95% - 97%)    O2 Parameters below as of 28 Oct 2023 16:50  Patient On (Oxygen Delivery Method): room air                I&O's Detail        LABS:                        15.4   11.50 )-----------( 259      ( 28 Oct 2023 15:30 )             48.0     10-28    140  |  104  |  19  ----------------------------<  135<H>  4.4   |  28  |  1.10    Ca    9.7      28 Oct 2023 15:30  Mg     1.7     10-28    TPro  7.7  /  Alb  4.1  /  TBili  0.5  /  DBili  x   /  AST  18  /  ALT  28  /  AlkPhos  59  10-28          CAPILLARY BLOOD GLUCOSE        PT/INR - ( 28 Oct 2023 15:30 )   PT: 11.9 sec;   INR: 1.02 ratio         PTT - ( 28 Oct 2023 15:30 )  PTT:33.4 sec  Urinalysis Basic - ( 28 Oct 2023 15:30 )    Color: x / Appearance: x / SG: x / pH: x  Gluc: 135 mg/dL / Ketone: x  / Bili: x / Urobili: x   Blood: x / Protein: x / Nitrite: x   Leuk Esterase: x / RBC: x / WBC x   Sq Epi: x / Non Sq Epi: x / Bacteria: x      CULTURES:      Physical Examination:    General: No acute distress.  Alert, oriented, interactive, nonfocal, following simple commands and moving all extremities     HEENT: Pupils equal, reactive to light.  Symmetric.    PULM: Breathing comfortabley, good BS B/L with no Rales or Rhonchi, no significant sputum production    CVS: Regular rate and rhythm, no murmurs, rubs, or gallops    ABD: BS+ Soft, nondistended, nontender, no masses    EXT: No edema, nontender    SKIN: Warm and well perfused, no rashes noted.    RADIOLOGY:   < from: Xray Chest 1 View- PORTABLE-Urgent (10.28.23 @ 15:42) >  INTERPRETATION:  AP chest on October 28, 2023 at 3:33 PM. Patient has   chest pain.    COMPARISON: None available.    Heart size is within normal limits.    Lungs are clear.    IMPRESSION: Negative chest.    --- End of Report ---    < end of copied text >      Time spent on this patient encoutner, which includes documenting this note in the EMR, was 75 minutes including assessing the presenting problems with associated risks, reviewing the medical record to prepare for the encounter, and meeting face to face with the patient to obtain additional history. I have also performed and interpreted diagnostic studies as documented. To improve communication and patient safety, I have coordinated with the multidisciplinary team including the bedside nurse, appropriate attending of record, and consultants as needed.   Patient is a 77y old  Male who presents with a chief complaint of chest pain.    BRIEF HOSPITAL COURSE: 77M with HTN. HLD, hypothyroidism, ulcerative colitis presenting with acute onset nonradiating, substernal chest pain with associated SOB and diaphoresis that began approximately 30-45 minutes prior to arrival. EKG significant for inferior JESSI with subtle ST depressions in V1 and V2. Labs notable for troponin 96.2, WBC 11.50. Now s/p 3 DWAINE to LAD by Dr Redding with EF 40% on LVgram. Admit to ICU for post-C management of anterior STEMI with anticipated staged PCI on Monday.        PAST MEDICAL & SURGICAL HISTORY:  Hypothyroid  Colitis  History of left hip replacement          Review of Systems:  CONSTITUTIONAL: No fever, chills, or fatigue  EYES: No eye pain, visual disturbances, or discharge  ENMT:  No difficulty hearing, tinnitus, vertigo; No sinus or throat pain  NECK: No pain or stiffness  RESPIRATORY: No cough, wheezing, chills or hemoptysis; No shortness of breath  CARDIOVASCULAR: No chest pain, palpitations, dizziness, or leg swelling  GASTROINTESTINAL: No abdominal or epigastric pain. No nausea, vomiting, or hematemesis; No diarrhea or constipation. No melena or hematochezia.  GENITOURINARY: No dysuria, frequency, hematuria, or incontinence  NEUROLOGICAL: No headaches, memory loss, loss of strength, numbness, or tremors  SKIN: No itching, burning, rashes, or lesions   MUSCULOSKELETAL: No joint pain or swelling; No muscle, back, or extremity pain  PSYCHIATRIC: No depression, anxiety, mood swings, or difficulty sleeping      Medications:                                  ICU Vital Signs Last 24 Hrs  T(C): 36.5 (28 Oct 2023 15:17), Max: 36.5 (28 Oct 2023 15:17)  T(F): 97.7 (28 Oct 2023 15:17), Max: 97.7 (28 Oct 2023 15:17)  HR: 82 (28 Oct 2023 16:50) (77 - 117)  BP: 142/71 (28 Oct 2023 16:50) (142/71 - 170/89)  BP(mean): 100 (28 Oct 2023 16:50) (100 - 103)  ABP: --  ABP(mean): --  RR: 16 (28 Oct 2023 16:50) (16 - 25)  SpO2: 97% (28 Oct 2023 16:50) (95% - 97%)    O2 Parameters below as of 28 Oct 2023 16:50  Patient On (Oxygen Delivery Method): room air                I&O's Detail        LABS:                        15.4   11.50 )-----------( 259      ( 28 Oct 2023 15:30 )             48.0     10-28    140  |  104  |  19  ----------------------------<  135<H>  4.4   |  28  |  1.10    Ca    9.7      28 Oct 2023 15:30  Mg     1.7     10-28    TPro  7.7  /  Alb  4.1  /  TBili  0.5  /  DBili  x   /  AST  18  /  ALT  28  /  AlkPhos  59  10-28          CAPILLARY BLOOD GLUCOSE        PT/INR - ( 28 Oct 2023 15:30 )   PT: 11.9 sec;   INR: 1.02 ratio         PTT - ( 28 Oct 2023 15:30 )  PTT:33.4 sec  Urinalysis Basic - ( 28 Oct 2023 15:30 )    Color: x / Appearance: x / SG: x / pH: x  Gluc: 135 mg/dL / Ketone: x  / Bili: x / Urobili: x   Blood: x / Protein: x / Nitrite: x   Leuk Esterase: x / RBC: x / WBC x   Sq Epi: x / Non Sq Epi: x / Bacteria: x      CULTURES:      Physical Examination:    General: No acute distress.  Alert, oriented, interactive, nonfocal, following simple commands and moving all extremities     HEENT: Pupils equal, reactive to light.  Symmetric.    PULM: Breathing comfortably on RA, good BS B/L with no Rales or Rhonchi, no significant sputum production    CVS: Regular rate and rhythm, no murmurs, rubs, or gallops    ABD: BS+ Soft, nondistended, nontender, no masses    EXT: No edema, nontender    SKIN: Warm and well perfused, no rashes noted. R Radial band in place without active bleeding or evidence of underlying hematoma.    RADIOLOGY:   < from: Xray Chest 1 View- PORTABLE-Urgent (10.28.23 @ 15:42) >  INTERPRETATION:  AP chest on October 28, 2023 at 3:33 PM. Patient has   chest pain.    COMPARISON: None available.    Heart size is within normal limits.    Lungs are clear.    IMPRESSION: Negative chest.    --- End of Report ---    < end of copied text >      Time spent on this patient encoutner, which includes documenting this note in the EMR, was 75 minutes including assessing the presenting problems with associated risks, reviewing the medical record to prepare for the encounter, and meeting face to face with the patient to obtain additional history. I have also performed and interpreted diagnostic studies as documented. To improve communication and patient safety, I have coordinated with the multidisciplinary team including the bedside nurse, appropriate attending of record, and consultants as needed.

## 2023-10-28 NOTE — ED PROVIDER NOTE - CRITICAL CARE ATTENDING CONTRIBUTION TO CARE
Acute chest pain a 77-year-old male with pain x30 to 45 minutes here now requiring thorough evaluation stat EKG critical care time of approximately 35 minutes with physician at bedside interpreting EKG administering meds as described consultation with PCI attending Dr. Stevens.

## 2023-10-28 NOTE — H&P ADULT - ATTENDING COMMENTS
77M with HTN. HLD, hypothyroidism, ulcerative colitis presenting with acute onset nonradiating, substernal chest pain with associated SOB and diaphoresis that began approximately 30-45 minutes prior to arrival. EKG significant for inferior JESSI with subtle ST depressions in V1 and V2. Labs notable for troponin 96.2, WBC 11.50. Now s/p 3 DWAINE to LAD by Dr Redding with EF 40% on LVgram.    T(C): 36.7 (10-28-23 @ 20:34), Max: 36.7 (10-28-23 @ 20:34)  HR: 70 (10-28-23 @ 23:00) (70 - 117)  BP: 146/64 (10-28-23 @ 23:00) (140/73 - 170/89)  RR: 30 (10-28-23 @ 23:00) (16 - 39)  SpO2: 96% (10-28-23 @ 23:00) (94% - 98%)    Reviewed labs and imaging    Anterior STEMI s/p 3 DWAINE to LAD  Staged PCI on Monday   D/C Dr Redding     Care as per Cards/ ICU

## 2023-10-28 NOTE — H&P ADULT - HISTORY OF PRESENT ILLNESS
77-year-old male with history of HTN, HLD as in his baseline state of good health up until approximately 30 to 45 minutes prior to arrival here when while at rest developed anterior substernal nonradiating chest pressure with slight shortness of breath and slight sweating.  On presentation to the ED, the  EKG was significant for inferior ST elevation with subtle ST depressions in V1 and V2. Patient has been transferred to ICU post catherization. He currently denies any fever, HA, cp, sob, abd pain, N/V/D/C.     ED course:   Vitals : T 97.7 /77, RR 25, SpO2 95% RA  HR 84  Labs: troponin 96.2, WBC 11.50  EKG:  inferior ST elevation with subtle ST depressions in V1 and V2.          77-year-old male with history of HTN, HLD as in his baseline state of good health up until approximately 30 to 45 minutes prior to arrival here when while at rest developed anterior substernal nonradiating chest pressure/heaviness that was persisting with slight shortness of breath and slight sweating. On presentation to the ED, the  EKG was significant for inferior ST elevation with subtle ST depressions in V1 and V2. Patient has been transferred to ICU post catherization. He currently denies any fever, HA, cp, sob, abd pain, N/V/D/C.     ED course:   Vitals : T 97.7 /77, RR 25, SpO2 95% RA  HR 84  Labs: troponin 96.2, WBC 11.50  EKG:  inferior ST elevation with subtle ST depressions in V1 and V2.          77-year-old male with history of HTN, HLD as in his baseline state of good health up until approximately 30 to 45 minutes prior to arrival here when while at rest developed anterior substernal nonradiating chest pressure/heaviness that was persisting with slight shortness of breath and slight sweating. On presentation to the ED, the  EKG was significant for inferior ST elevation with subtle ST depressions in V1 and V2. Patient has been transferred to ICU post catherization. He currently denies any fever, HA, cp, sob, abd pain, N/V/D/C. Pt states chest pain has improved and now feels like chest soreness.    ED course:   Vitals : T 97.7 /77, RR 25, SpO2 95% RA  HR 84  Labs: troponin 96.2, WBC 11.50  EKG:  inferior ST elevation with subtle ST depressions in V1 and V2.

## 2023-10-28 NOTE — H&P ADULT - NSHPPHYSICALEXAM_GEN_ALL_CORE
T(C): 36.5 (10-28-23 @ 17:05), Max: 36.5 (10-28-23 @ 15:17)  HR: 82 (10-28-23 @ 17:05) (77 - 117)  BP: 147/77 (10-28-23 @ 17:05) (142/71 - 170/89)  RR: 24 (10-28-23 @ 17:05) (16 - 25)  SpO2: 95% (10-28-23 @ 17:05) (95% - 97%)    PHYSICAL EXAM:  GENERAL: NAD, lying in bed comfortably  HEAD:  Atraumatic, Normocephalic  EYES: EOMI, PERRLA, conjunctiva and sclera clear  ENT: Moist mucous membranes  NECK: Supple, No JVD  CHEST/LUNG: Clear to auscultation bilaterally; No rales, rhonchi, wheezing, or rubs. Unlabored respirations  HEART: Regular rate and rhythm; No murmurs, rubs, or gallops  ABDOMEN: Bowel sounds present; Soft, Nontender, Nondistended. No hepatomegally  EXTREMITIES:  2+ Peripheral Pulses, brisk capillary refill. No clubbing, cyanosis, or edema  NERVOUS SYSTEM:  Alert & Oriented X3, speech clear. No deficits   MSK: FROM all 4 extremities, full and equal strength  SKIN: No rashes or lesions T(C): 36.5 (10-28-23 @ 17:05), Max: 36.5 (10-28-23 @ 15:17)  HR: 82 (10-28-23 @ 17:05) (77 - 117)  BP: 147/77 (10-28-23 @ 17:05) (142/71 - 170/89)  RR: 24 (10-28-23 @ 17:05) (16 - 25)  SpO2: 95% (10-28-23 @ 17:05) (95% - 97%)    PHYSICAL EXAM:  GENERAL: NAD, lying in bed comfortably  HEAD:  Atraumatic, Normocephalic  EYES: EOMI, PERRLA, conjunctiva and sclera clear  ENT: Moist mucous membranes  NECK: Supple, No JVD  CHEST/LUNG: Clear to auscultation bilaterally; No rales, rhonchi, wheezing, or rubs. Unlabored respirations  HEART: Regular rate and rhythm; No murmurs, rubs, or gallops  ABDOMEN: Bowel sounds present; Soft, Nontender, Nondistended. No hepatomegaly  EXTREMITIES:  2+ Peripheral Pulses, brisk capillary refill. No clubbing, cyanosis, or edema  NERVOUS SYSTEM:  Alert & Oriented X3, speech clear. No deficits   MSK: FROM all 4 extremities, full and equal strength  SKIN: No rashes or lesions

## 2023-10-28 NOTE — H&P ADULT - ASSESSMENT
77M with HTN. HLD, hypothyroidism, ulcerative colitis presenting with acute onset nonradiating, substernal chest pain with associated SOB and diaphoresis that began approximately 30-45 minutes prior to arrival. EKG significant for inferior JESSI with subtle ST depressions in V1 and V2. Labs notable for troponin 96.2, WBC 11.50. Now s/p 3 DWAINE to LAD by Dr Redding with EF 40% on LVgram. Admit to ICU for post-C management of anterior STEMI with anticipated staged PCI on Monday.    Anterior STEMI s/p 3 DWAINE to LAD

## 2023-10-28 NOTE — ED PROVIDER NOTE - CLINICAL SUMMARY MEDICAL DECISION MAKING FREE TEXT BOX
Acute onset of chest pain times approximately 30 to 45 minutes requiring thorough evaluation as well as labs EKG medications interventional consult with Dr. Stevens and emergent PCI.

## 2023-10-28 NOTE — ED ADULT NURSE NOTE - OBJECTIVE STATEMENT
Midsternal CP 1 hour PTA.  Non radiating.  Noted ST elevation.  Code stemi initiated.  xfer to cath lab [id number]

## 2023-10-28 NOTE — CONSULT NOTE ADULT - ASSESSMENT
77M with HTN. HLD, hypothyroidism, ulcerative colitis presenting with acute onset nonradiating, substernal chest pain with associated SOB and diaphoresis that began approximately 30-45 minutes prior to arrival. EKG significant for inferior JESSI with subtle ST depressions in V1 and V2. Labs notable for troponin 96.2, WBC 11.50. Now s/p 3 DWAINE to LAD by Dr Redding with EF 40% on LVgram. Admit to ICU for post-LHC management of anterior STEMI with anticipated staged PCI on Monday.    Anterior STEMI s/p 3 DWAINE to LAD    Plan:  NEURO: Mentation intact.  CARDIAC: S/p 3 DWAINE to LAD. Start ASA and Brilinta to promote stent patency. Ordered for lipid panel and TTE (EF 40% on LVgram). Trend trop to peak. Will require staged PCI to LCx anticipated for Monday.   RESPIRATORY: Satting high 90s on ***. Goal SpO2>92%.  GI: Cardiac diet.  : Trend Cr. Monitor I&Os. Goal K>4, Mg>2, P>3 for optimal arrhythmia suppression.  ENDO: Restart home synthroid. Ordered for TSH and A1c. Goal -180.   ID: Afebrile, initial leukocytosis likely reactive. No obvious clinical signs of infection or current indication for abx.  HEME: Per Dr Redding, no need to heparin gtt. Will start DVT ppx with SQH. SCDs.    Dispo: Admit to ICU for post-LHC management of STEMI.    Case discussed with ICU Attending Dr Bangura and IC Dr Redding.    Time spent on this patient encoutner, which includes documenting this note in the EMR, was 75 minutes including assessing the presenting problems with associated risks, reviewing the medical record to prepare for the encounter, and meeting face to face with the patient to obtain additional history. I have also performed and interpreted diagnostic studies as documented. To improve communication and patient safety, I have coordinated with the multidisciplinary team including the bedside nurse, appropriate attending of record, and consultants as needed.   77M with HTN. HLD, hypothyroidism, ulcerative colitis presenting with acute onset nonradiating, substernal chest pain with associated SOB and diaphoresis that began approximately 30-45 minutes prior to arrival. EKG significant for inferior JESSI with subtle ST depressions in V1 and V2. Labs notable for troponin 96.2, WBC 11.50. Now s/p 3 DWAINE to LAD by Dr Redding with EF 40% on LVgram. Admit to ICU for post-LHC management of anterior STEMI with anticipated staged PCI on Monday.    Anterior STEMI s/p 3 DWAINE to LAD    Plan:  NEURO: Mentation intact.  CARDIAC: S/p 3 DWAINE to LAD. Start ASA and Brilinta to promote stent patency. Ordered for lipid panel and TTE (EF 40% on LVgram). Trend trop to peak. Will require staged PCI to LCx anticipated for Monday.   RESPIRATORY: Satting high 90s on RA. Goal SpO2>92%.  GI: Cardiac diet.  : Trend Cr. Monitor I&Os. Goal K>4, Mg>2, P>3 for optimal arrhythmia suppression.  ENDO: Restart home synthroid. Ordered for TSH and A1c. Goal -180.   ID: Afebrile, initial leukocytosis likely reactive. No obvious clinical signs of infection or current indication for abx.  HEME: Per Dr Redding, no need for heparin gtt. Will start DVT ppx with SQH. SCDs.    Dispo: Admit to ICU for post-LHC management of STEMI.    Case discussed with ICU Attending Dr Bangura and IC Dr Redding.   77M with HTN. HLD, hypothyroidism, ulcerative colitis presenting with acute onset nonradiating, substernal chest pain with associated SOB and diaphoresis that began approximately 30-45 minutes prior to arrival. EKG significant for inferior JESSI with subtle ST depressions in V1 and V2. Labs notable for troponin 96.2, WBC 11.50. Now s/p 3 DWAINE to LAD by Dr Redding with EF 40% on LVgram. Admit to ICU for post-C management of anterior STEMI with anticipated staged PCI on Monday.    Anterior STEMI s/p 3 DWAINE to LAD    Plan:  NEURO: Mentation intact.  CARDIAC: S/p 3 DWAINE to LAD. Post cath EKG with resolution of JESSI and depressions. R radial band to be removed at approx 8PM. Start ASA and Brilinta to promote stent patency. Ordered for lipid panel and TTE (EF 40% on LVgram). Trend trop to peak. Will require staged PCI to LCx anticipated for Monday.   RESPIRATORY: Satting high 90s on RA. Goal SpO2>92%.  GI: Cardiac diet. Restart home mesalamine.  : Trend Cr. Monitor I&Os. Goal K>4, Mg>2, P>3 for optimal arrhythmia suppression.  ENDO: Restart home synthroid. Ordered for TSH and A1c. Goal -180.   ID: Afebrile, initial leukocytosis likely reactive. No obvious clinical signs of infection or current indication for abx.  HEME: Per Dr Redding, no need for heparin gtt. Will start DVT ppx with SQH. SCDs.    Dispo: Admit to ICU for post-C management of STEMI.    Case discussed with ICU Attending Dr Bangura and IC Dr Redding.

## 2023-10-28 NOTE — PATIENT PROFILE ADULT - CAREGIVER RELATION TO PATIENT
Wife [de-identified] : RIGHT HIP PAIN - HAD RIGHT HIP REPLACED 05/29/2015\par NO RECENT FALLS OR ACCIDENTS \par SENT FOR NEW X[facility]YS TODAY

## 2023-10-28 NOTE — ED ADULT TRIAGE NOTE - ACCOMPANIED BY
[facility]    Comprehensive and complex medical care - Chronic disease management - Shared decision making - Care coordination - Compassionate care    Patient advocacy - Rational deprescribing - Minimally disruptive medicine - Ethical focus - Customized care         Date of Service: 9/8/2023  Primary Provider: Kike David    Patient Care Team:  Kike David MD as PCP - General (Internal Medicine)  Rosario Byrne NP as Nurse Practitioner  Kike David MD as Assigned PCP  Kike David MD (Internal Medicine)  Avril Lux RN as Specialty Care Coordinator (Neurology)  Kike David MD as Assigned Pain Medication Provider          Patient's Pharmacy:    [facility]/pharmacy #[id number] - [place], [place] - [address]  [address] [address]  Phone: [phone] Fax: [phone]    [place], [place] - [address]  [address]  [address]  [facility] [place] [address]  Phone: [phone] Fax: [phone]    [facility]/pharmacy #[id number] - [place], [place] - [address]  [address] [address]  Phone: [phone] Fax: [phone]    [facility] [address] [facility] [employer] - [place], [place] - [address]  [address]  [facility] [address]  Phone: [phone] Fax: [phone]     Patient's Contacts:  Name Home Phone Work Phone Mobile Phone Relationship Lgl Jayce   JOSE ESTHER   [phone] Spouse    SALONI MORALES [phone]   Friend      Patient's Insurance:    Payor: [employer] / Plan: [employer] PMAP / Product Type: HMO /            Active Problem List:  Problem List as of 9/8/2023 Reviewed: 7/27/2023 12:48 PM by Shree Olsen -- S/P CABG    Type 2 diabetes mellitus (H)    Last Assessment & Plan 6/7/2022 Office Visit Edited 6/10/2022  8:06 AM by Mara Scott NP     Type 2 diabetes is not well controlled but she is using the Lantus injection regularly.  She ran out of the  freestyle hosea sensors so she does not have any recent glucose readings to base medication adjustments on at this time.  She was prescribed Trulicity in the past but was nervous about taking this medication so she never started it.  We reviewed possible side effects associated with the medication and what she may expect in the first 2 weeks of taking this medication.  She would be comfortable trying this medication again, Trulicity 0.75 mg weekly sent to her pharmacy.  She is advised that this is a small dose and will need to be gradually increased in order for her to obtain maximal benefit from the medication.  She will be scheduled for a follow-up appointment and establish care visit with another provider in the next 4 weeks.  Of note, she would likely benefit from increasing her dose of metformin back to maximal dose but was concerned that this caused her to have increased urinary frequency.  We did not address this today.         Cerebrovascular accident (CVA) due to stenosis of carotid artery (H)    Smoker       Medium    Carotid stenosis, left    HTN (hypertension)    Moderate persistent asthma    Chronic pain syndrome    Right renal artery stenosis (H)    Schizoaffective disorder (H)    Chronic obstructive pulmonary disease (H)    Anemia    Depression with anxiety    Myelomalacia of cervical cord (H)    Atypical chest pain -- Normal NM stress 5/8/23    Syncope    Acute CVA (cerebrovascular accident) (H)       Low    Bilateral low back pain with right-sided sciatica, unspecified chronicity    Last Assessment & Plan 6/7/2022 Office Visit Written 6/10/2022  8:01 AM by Mara Scott NP     Because of her uncontrolled diabetes, she is not a good candidate to use steroid medication to treat lumbar radiculopathy.  She is familiar with the effects of a steroid medication on her blood sugar and understands this conundrum.  Fortunately, she does experience some relief when she is given Toradol in the emergency  department.  We discussed trial of a nonsteroidal anti-inflammatory medication, diclofenac 3 times daily.  She is open to this idea so this is sent to her pharmacy.  She is advised that she can continue with acetaminophen as well.         Nasal polyp    Last Assessment & Plan 6/7/2022 Office Visit Written 6/10/2022  8:03 AM by Mara Scott NP     Advised fluticasone nasal spray 1 spray each nostril daily.  She declines a referral to ENT, she has had a nasal polyp in the past which responded to fluticasone.         Mixed hyperlipidemia    GERD (gastroesophageal reflux disease)    History of drug abuse (H)    Hx of syphilis    Menopausal flushing    Nephrolithiasis    Obesity    Seasonal allergies    Sensorineural hearing loss (SNHL) of right ear        Current Outpatient Medications   Medication Instructions    acetaminophen (TYLENOL) 650 mg, Oral, EVERY 8 HOURS PRN    acetaminophen (TYLENOL) 650 mg, Oral, EVERY 8 HOURS PRN    albuterol (PROAIR HFA) 108 (90 BASE) MCG/ACT inhaler 1-2 puffs, Inhalation, EVERY 4 HOURS PRN    albuterol (PROAIR HFA/PROVENTIL HFA/VENTOLIN HFA) 108 (90 Base) MCG/ACT inhaler 1-2 puffs, Inhalation, EVERY 4 HOURS PRN    alcohol swab prep pads Use to swab area of injection/zac as directed.    Alcohol Swabs (ALCOHOL PREP) PADS 4 Pads, Does not apply, DAILY, For use with insulin injections    aspirin (ASA) 81 mg, Oral, DAILY    atorvastatin (LIPITOR) 40 mg, Oral, EVERY EVENING    atorvastatin (LIPITOR) 80 mg, Oral, EVERY EVENING    buprenorphine-naloxone (SUBOXONE) 2-0.5 MG SUBL sublingual tablet 1 tablet, Sublingual, 3 TIMES DAILY    carvedilol (COREG) 12.5 mg, Oral, 2 TIMES DAILY WITH MEALS    cetirizine (ZYRTEC) 10 mg, Oral, DAILY    cetirizine (ZYRTEC) 10 mg, Oral, DAILY    clopidogrel (PLAVIX) 75 mg, Oral, DAILY    Continuous Blood Gluc  (DEXCOM G6 ) ROSE Use to read blood sugars as per 's instructions.    Continuous Blood Gluc  (Nexess SHEBA 2  READER) ROSE Use to read blood sugars as per 's instructions.    Continuous Blood Gluc Sensor (DEXCOM G6 SENSOR) MISC Change every 10 days.    Continuous Blood Gluc Sensor (FREESTYLE SHEBA 2 SENSOR) MISC Change every 14 days.    Continuous Blood Gluc Transmit (DEXCOM G6 TRANSMITTER) MISC Change every 3 months.    CONTOUR NEXT TEST test strip Use to test blood sugar 4 times daily.    diclofenac (VOLTAREN) 2 g, Topical, 3 TIMES DAILY PRN    dulaglutide (TRULICITY) 0.75 mg, Subcutaneous, EVERY 7 DAYS    DULoxetine (CYMBALTA) 60 mg, Oral, 2 TIMES DAILY    DULoxetine HCl 60 mg, Oral, 2 TIMES DAILY    fluticasone (FLONASE) 50 MCG/ACT nasal spray 1 spray, Both Nostrils, DAILY PRN    fluticasone (FLONASE) 50 MCG/ACT nasal spray 1 spray, Both Nostrils, DAILY PRN    glipiZIDE (GLUCOTROL XL) 10 mg, Oral, DAILY    hydrocortisone (CORTAID) 1 % external cream Topical, 2 TIMES DAILY, Apply to foot    hydrocortisone 1 % CREA cream Rectal, 2 TIMES DAILY PRN    insulin aspart (NOVOLOG VIAL) 100 UNITS/ML vial 3 times a day with meals, for glucometer 150-200 give 2 units SQ, 201-250 give 4 units, >250 give 6 units    insulin glargine (LANTUS PEN) 25 Units, Subcutaneous, EVERY MORNING    insulin glargine (LANTUS PEN) 24 Units, Subcutaneous, AT BEDTIME    ipratropium - albuterol 0.5 mg/2.5 mg/3 mL (DUONEB) 0.5-2.5 (3) MG/3ML neb solution 1 vial, Nebulization, EVERY 6 HOURS PRN    ketorolac (TORADOL) 10 mg, Oral, EVERY 6 HOURS PRN    lidocaine (LIDODERM) 5 % patch 1 patch, Transdermal, EVERY 24 HOURS, To prevent lidocaine toxicity, patient should be patch free for 12 hrs daily. BACK     lidocaine (LIDODERM) 5 % patch Transdermal, EVERY 24 HOURS, To prevent lidocaine toxicity, patient should be patch free for 12 hrs daily.    methocarbamol (ROBAXIN) 750 mg, Oral, 4 TIMES DAILY PRN    Microlet Lancets MISC 100 each, Does not apply, 4 TIMES DAILY, Use to test blood sugar 4 daily.    naloxone (NARCAN) 4 mg, Alternating Nostrils,  PRN, every 2-3 minutes until assistance arrives    naloxone (NARCAN) 4 mg, Alternating Nostrils, PRN, every 2-3 minutes until assistance arrives    nitroGLYcerin (NITROSTAT) 0.4 mg, Sublingual, EVERY 5 MIN PRN, For chest pain place 1 tablet under the tongue every 5 minutes for 3 doses. If symptoms persist 5 minutes after 1st dose call 911.     nortriptyline (PAMELOR) 10 mg, Oral, AT BEDTIME    omeprazole (PRILOSEC) 40 mg, Oral, DAILY, To protect your stomach.    ondansetron (ZOFRAN) 8 mg, Oral, EVERY 8 HOURS PRN    oxyCODONE-acetaminophen (PERCOCET)  MG per tablet 1 tablet, Oral, EVERY 6 HOURS PRN    oxyCODONE-acetaminophen (PERCOCET)  MG per tablet 1 tablet, Oral, EVERY 6 HOURS PRN    topiramate (TOPAMAX) 100 mg, Oral, 2 TIMES DAILY    Trulicity 0.75 mg, Subcutaneous, EVERY 7 DAYS    valsartan (DIOVAN) 40 mg, Oral, DAILY    valsartan (DIOVAN) 40 mg, Oral, DAILY      Social History     Social History Narrative    ** Merged History Encounter **         Lives alone in a condo.      On disability.  Three living children.         Subjective:     Sarah Rahman is a 61 year old female who comes in today for:    Chief Complaint   Patient presents with    ER F/U    Recheck Medication    Blood Sugar     Pt would like to check her blood sugar           9/8/2023    12:05 PM   Additional Questions   Roomed by Avinash ZIMMER   Accompanied by N/A     Patient comes in today for follow-up of a number of issues.  Mainly in for follow-up of hospital.    She presented to the emergency room with right-sided weakness with her arm and leg.  She had speech deficit.  She was treated as a left-sided stroke.  She was given TNK.  The results of her scans were reviewed and she has intracranial stenosis as well.    Since her hospital stay, she has had still persistent right-sided weakness in her hand and her right leg.  Her speech has come back.  She is on clopidogrel 75 mg and aspirin 81 mg a day.  She was instructed to continue on  "this indefinitely at this time.    We reviewed her diabetes and her diabetes has been difficult to control.  She has highs and lows.  She is not tolerating using the freestyle hosea machine well.  She would like to try the Dexcom 7.  She does get low blood sugars.  She would like to have something with alarm for this.  She has difficulty doing her blood regularly with her test strips.    She would like to go ahead and get a mammogram.  We put in a diabetic education order as well.    She would like to have follow-up blood work.    She has a number of medical issues and frequent fluctuation.  This makes hard work to get some sort of stability but we will continue to follow her closely related to this.    We reviewed her chronic pain as well.    We reviewed her other issues noted in the assessment but not specifically addressed in the HPI above.     Objective:     Wt Readings from Last 3 Encounters:   09/08/23 89 kg (196 lb 1.6 oz)   07/28/23 87.4 kg (192 lb 9.6 oz)   05/25/23 93.9 kg (207 lb)     BP Readings from Last 3 Encounters:   09/08/23 123/85   07/29/23 (!) 143/78   05/25/23 (!) 144/83     /85 (BP Location: Right arm, Patient Position: Sitting, Cuff Size: Adult Regular)   Pulse 91   Temp 98  F (36.7  C) (Oral)   Resp 20   Ht 1.702 m (5' 7\")   Wt 89 kg (196 lb 1.6 oz)   LMP  (LMP Unknown)   SpO2 100%   BMI 30.71 kg/m     The patient is comfortable, no acute distress.  Mood good.  Insight good.  Eyes are nonicteric.  Neck is supple without mass.  No cervical adenopathy.  No thyromegaly. Heart regular rate and rhythm.  Lungs clear to auscultation bilaterally.  Respiratory effort is good.  Extremities no edema.  She has some weakness of her hand and some weakness of her foot but is able to walk.      Diagnostics:     Admission on 07/27/2023, Discharged on 07/29/2023   Component Date Value Ref Range Status    Sodium 07/27/2023 139  136 - 145 mmol/L Final    Potassium 07/27/2023 3.8  3.4 - 5.3 mmol/L " Final    Chloride 07/27/2023 106  98 - 107 mmol/L Final    Carbon Dioxide (CO2) 07/27/2023 22  22 - 29 mmol/L Final    Anion Gap 07/27/2023 11  7 - 15 mmol/L Final    Urea Nitrogen 07/27/2023 16.7  6.0 - 20.0 mg/dL Final    Creatinine 07/27/2023 0.94  0.51 - 0.95 mg/dL Final    Calcium 07/27/2023 9.6  8.6 - 10.0 mg/dL Final    Glucose 07/27/2023 255 (H)  70 - 99 mg/dL Final    GFR Estimate 07/27/2023 70  >60 mL/min/1.73m2 Final    Troponin T, High Sensitivity 07/27/2023 12  <=14 ng/L Final    Either a High Sensitivity Troponin T baseline (0 hours) value = 100 ng/L, or an increase in High Sensitivity Troponin T = 7 ng/L at 2 hours compared to 0 hours (2-0 hours), suggests myocardial injury, and urgent clinical attention is required.    If the 2-0 hours increase is <7 ng/L, a High Sensitivity Troponin T result above gender-specific reference ranges warrants further evaluation.   Recommendations for further evaluation include correlation with clinical decision-making tool (e.g., HEART), a 3rd High Sensitivity Troponin T test 2 hours after the 2nd (a 20% change from baseline would represent concern), admission for observation, close PCC/cardiology follow-up, or urgent outpatient provocative testing.    Ventricular Rate 07/27/2023 89  BPM Final    Atrial Rate 07/27/2023 89  BPM Final    GA Interval 07/27/2023 178  ms Final    QRS Duration 07/27/2023 78  ms Final    QT 07/27/2023 376  ms Final    QTc 07/27/2023 457  ms Final    P Axis 07/27/2023 58  degrees Final    R AXIS 07/27/2023 25  degrees Final    T Axis 07/27/2023 126  degrees Final    Interpretation ECG 07/27/2023    Final                    Value:Sinus rhythm  Possible Left atrial enlargement  Nonspecific ST and T wave abnormality  Abnormal ECG    Confirmed by SEE ED PROVIDER NOTE FOR, ECG INTERPRETATION ([id number]),  EVERETT MCCARTHY ([id number]) on 7/31/2023 1:02:27 PM      WBC Count 07/27/2023 7.6  4.0 - 11.0 10e3/uL Final    RBC Count 07/27/2023 3.95  3.80 - 5.20  10e6/uL Final    Hemoglobin 07/27/2023 10.7 (L)  11.7 - 15.7 g/dL Final    Hematocrit 07/27/2023 34.3 (L)  35.0 - 47.0 % Final    MCV 07/27/2023 87  78 - 100 fL Final    MCH 07/27/2023 27.1  26.5 - 33.0 pg Final    MCHC 07/27/2023 31.2 (L)  31.5 - 36.5 g/dL Final    RDW 07/27/2023 12.4  10.0 - 15.0 % Final    Platelet Count 07/27/2023 139 (L)  150 - 450 10e3/uL Final    % Neutrophils 07/27/2023 61  % Final    % Lymphocytes 07/27/2023 30  % Final    % Monocytes 07/27/2023 5  % Final    % Eosinophils 07/27/2023 3  % Final    % Basophils 07/27/2023 1  % Final    % Immature Granulocytes 07/27/2023 0  % Final    NRBCs per 100 WBC 07/27/2023 0  <1 /100 Final    Absolute Neutrophils 07/27/2023 4.7  1.6 - 8.3 10e3/uL Final    Absolute Lymphocytes 07/27/2023 2.3  0.8 - 5.3 10e3/uL Final    Absolute Monocytes 07/27/2023 0.4  0.0 - 1.3 10e3/uL Final    Absolute Eosinophils 07/27/2023 0.2  0.0 - 0.7 10e3/uL Final    Absolute Basophils 07/27/2023 0.1  0.0 - 0.2 10e3/uL Final    Absolute Immature Granulocytes 07/27/2023 0.0  <=0.4 10e3/uL Final    Absolute NRBCs 07/27/2023 0.0  10e3/uL Final    GLUCOSE BY METER POCT 07/27/2023 268 (H)  70 - 99 mg/dL Final    GLUCOSE BY METER POCT 07/27/2023 181 (H)  70 - 99 mg/dL Final    Hemoglobin A1C 07/27/2023 8.3 (H)  <5.7 % Final    Normal <5.7%   Prediabetes 5.7-6.4%    Diabetes 6.5% or higher     Note: Adopted from ADA consensus guidelines.    Cholesterol 07/27/2023 191  <200 mg/dL Final    Triglycerides 07/27/2023 199 (H)  <150 mg/dL Final    Direct Measure HDL 07/27/2023 35 (L)  >=50 mg/dL Final    LDL Cholesterol Calculated 07/27/2023 116 (H)  <=100 mg/dL Final    Non HDL Cholesterol 07/27/2023 156 (H)  <130 mg/dL Final    GLUCOSE BY METER POCT 07/27/2023 162 (H)  70 - 99 mg/dL Final    WBC Count 07/28/2023 7.0  4.0 - 11.0 10e3/uL Final    RBC Count 07/28/2023 3.99  3.80 - 5.20 10e6/uL Final    Hemoglobin 07/28/2023 10.7 (L)  11.7 - 15.7 g/dL Final    Hematocrit 07/28/2023 34.6 (L)   35.0 - 47.0 % Final    MCV 07/28/2023 87  78 - 100 fL Final    MCH 07/28/2023 26.8  26.5 - 33.0 pg Final    MCHC 07/28/2023 30.9 (L)  31.5 - 36.5 g/dL Final    RDW 07/28/2023 12.6  10.0 - 15.0 % Final    Platelet Count 07/28/2023 218  150 - 450 10e3/uL Final    This result has been called to DELTA INCREASE by Kellen Laughlin on 07 28 2023 at 0454, and has not been read back.     Sodium 07/28/2023 140  136 - 145 mmol/L Final    Potassium 07/28/2023 3.6  3.4 - 5.3 mmol/L Final    Chloride 07/28/2023 106  98 - 107 mmol/L Final    Carbon Dioxide (CO2) 07/28/2023 21 (L)  22 - 29 mmol/L Final    Anion Gap 07/28/2023 13  7 - 15 mmol/L Final    Urea Nitrogen 07/28/2023 15.2  6.0 - 20.0 mg/dL Final    Creatinine 07/28/2023 0.73  0.51 - 0.95 mg/dL Final    Calcium 07/28/2023 8.5 (L)  8.6 - 10.0 mg/dL Final    Glucose 07/28/2023 241 (H)  70 - 99 mg/dL Final    GFR Estimate 07/28/2023 >90  >60 mL/min/1.73m2 Final    INR 07/28/2023 1.05  0.85 - 1.15 Final    aPTT 07/28/2023 26  22 - 38 Seconds Final    LVEF  07/28/2023 60-65%   Final    GLUCOSE BY METER POCT 07/28/2023 225 (H)  70 - 99 mg/dL Final    GLUCOSE BY METER POCT 07/28/2023 282 (H)  70 - 99 mg/dL Final    GLUCOSE BY METER POCT 07/28/2023 186 (H)  70 - 99 mg/dL Final    GLUCOSE BY METER POCT 07/28/2023 202 (H)  70 - 99 mg/dL Final    GLUCOSE BY METER POCT 07/29/2023 170 (H)  70 - 99 mg/dL Final       Results for orders placed or performed in visit on 09/08/23   Basic metabolic panel     Status: Abnormal   Result Value Ref Range    Sodium 138 136 - 145 mmol/L    Potassium 4.1 3.4 - 5.3 mmol/L    Chloride 106 98 - 107 mmol/L    Carbon Dioxide (CO2) 20 (L) 22 - 29 mmol/L    Anion Gap 12 7 - 15 mmol/L    Urea Nitrogen 20.9 8.0 - 23.0 mg/dL    Creatinine 0.86 0.51 - 0.95 mg/dL    Calcium 9.3 8.6 - 10.0 mg/dL    Glucose 218 (H) 70 - 99 mg/dL    GFR Estimate 77 >60 mL/min/1.73m2   Hemoglobin A1c     Status: Abnormal   Result Value Ref Range    Hemoglobin A1C 9.7 (H) 0.0 - 5.6  %   C-peptide     Status: Normal   Result Value Ref Range    C Peptide 4.1 0.9 - 6.9 ng/mL   Glucose whole blood     Status: Abnormal   Result Value Ref Range    Glucose Whole Blood 201 (H) 60 - 99 mg/dL        Assessment:     1. Right sided weakness    2. Intracranial atherosclerosis    3. Acute CVA (cerebrovascular accident) (H)   Slow recovery.  Continue to monitor.  Continue current treatment.   4. Visit for screening mammogram    5. Myelomalacia of cervical cord (H)   Previously noted.  Continue to monitor.  Continue to address as necessary.   6. Type 2 diabetes mellitus with diabetic polyneuropathy, with long-term current use of insulin (H)   Continue insulin therapy.  Continue to monitor.   7. Type 2 diabetes mellitus with hyperglycemia, with long-term current use of insulin (H)   Continue insulin therapy.  Continue to monitor.   8. Aphasia    9. Hospital discharge follow-up        Plan:     Blood work done today.  Referral to diabetes education.  Mammogram ordered.  Order for Dexcom 7.  This would be helpful in managing her diabetes.  Declined flu shot today.  Close follow-up.  Follow-up with neurology as ordered.  Continue current medications otherwise.  Follow up sooner if issues.    Orders Placed This Encounter   Procedures    *MA Screening Digital Bilateral    Glucose by meter    Basic metabolic panel    Hemoglobin A1c    C-peptide    Glucose whole blood    AMB Adult Diabetes Educator Referral        40 minutes or greater was spent today on the patient's care on the day of service.      This includes time for chart preparation, reviewing medical tests done before or during the visit, talking with the patient, review of quality indicators, required documentation, and other elements of care.        Kike David MD  General Internal Medicine  [facility]    Return in about 5 weeks (around 10/12/2023) for follow up visit.     Future Appointments   Date Time Provider Department  [place]   10/12/2023 12:30 PM [facility]   11/10/2023  2:30 PM Viviana Rodriguez APRN CNP [facility]        Spouse/Significant other

## 2023-10-28 NOTE — PATIENT PROFILE ADULT - NSPROIMPLANTSMEDDEV_GEN_A_NUR
[facility]     Department of Hospital Medicine     [address] [place] [address]     [phone] after hours  [phone] fax       NURSING HOME ORDERS    11/05/2020    Admit to Nursing Home:  Skilled Bed                                               Diagnoses:  Active Hospital Problems    Diagnosis  POA    *Sepsis [A41.9]  Unknown    Wounds, multiple [T07.XXXA]  Unknown    Atrial fibrillation [I48.91]  Unknown    Rhabdomyolysis [M62.82]  Unknown    Nonischemic cardiomyopathy [I42.8]  Unknown    Acute encephalopathy [G93.40]  Unknown    HLD (hyperlipidemia) [E78.5]  Unknown    Elevated troponin [R77.8]  Unknown    Iron deficiency anemia [D50.9]  Yes    Obstructive sleep apnea treated with continuous positive airway pressure (CPAP) [G47.33, Z99.89]  Not Applicable    Type II diabetes mellitus [E11.8, E11.65]  Yes    CKD (chronic kidney disease) stage 3, GFR 30-59 ml/min [N18.30]  Yes    Hypertension [I10]  Yes      Resolved Hospital Problems   No resolved problems to display.       Patient is homebound due to:  Sepsis    Allergies:  Review of patient's allergies indicates:   Allergen Reactions    Spironolactone      Hyperkalemia         Vitals:    Once weekly  Diet: Diabetic diet, 2 L fluid restriction(enter specific diet and fluid restriction then delete this reminder)      Acitivities:    - Up in a chair each morning as tolerated   - Ambulate with assistance to bathroom   - Scheduled walks once each shift (every 8 hours)   -  Weight bearing: As tolerated     LABS:  Per facility protocol     CMP, CBC each month for 3 months   PT-INR each week for 1 month then monthly   Pre-albumin each month for 3 months     Nursing Precautions:     - Aspiration precautions:             - Total assistance with meals            -  Upright 90 degrees befor during and after meals             -  Suction at bedside          - Fall precautions per nursing home protocol     - Decubitus precautions:        -  for positioning   - Pressure reducing foam mattress   - Turn patient every two hours. Use wedge pillows to anchor patient    CONSULTS:      Physical Therapy to evaluate and treat     Occupational Therapy to evaluate and treat      Nutrition to evaluate and recommend diet     Psychiatry to evaluate and follow patients for delirium    MISCELLANEOUS CARE:       Routine Skin for Bedridden Patients:  Apply moisture barrier cream to all    skin folds and wet areas in perineal area daily and after baths and                           all bowel movements.  WOUND CARE:  Wound spray or saline for wound cleaning with all dressing changes.    All wounds to be measured with first dressing changes and every week.        Other Wounds:   skin tear            Location:  Left lateral upper chest/axilla partial thickness skin loss with irregular wound edges, Left upper breast discolored purple area with irregular edges/blisters, Left lateral abdomen partial thickness skin tear with ruptured blister, Left pannus, Left upper thigh diffuse purple discoloration, Left knee  Purple/maroon discolored area with intact skin.        Apply the following to wound:    The patient is lying on a low air loss surface. Needs 2-3 assist to turn, moisture control with blue pads, pillows for support. Skin tears and blisters to left side of body- left upper chest/breast, axilla, abdomen, pannus, left hip, left upper thigh, left knee.   Recommendations:  Follow skin tear standing orders-- foam dressing over skin tears- change weekly  Miconazole powder to skin folds BID. .   Nursing to continue pressure prevention measures and care.      Heart Failure Home Health Instructions:     SN to instruct on the following:    Instruct on the definition of CHF.   Instruct on the signs/sympoms of CHF to be reported.   Instruct on and monitor daily weights.   Instruct on factors that cause exacerbation.   Instruct on action,  dose, schedule, and side effects of medications.   Instruct on diet as prescribed.   Instruct on activity allowed.   Instruct on life-style modifications for life long management of CHF   SN to assess compliance with daily weights, diet, medications, fluid retention,    safety precautions, activities permitted and life-style modifications.   Additional 1-2 SN visits per week as needed for signs and symptoms     of CHF exacerbation.    For Weight Gain > 2-3 lbs in 1 day or 4-6 lbs over 1 week:    Continue lasix 40 mg. If patient gains 2-3 lb in 1 day or 4-6 lbs over 1 week, can increase to 80 mg qD for five days. BMP after 3 days and contact PCP with any concerns.      DIABETES CARE:      Check blood sugar:        Fingerstick blood sugar AC and HS   Fingerstick blood sugar every 6 hours if unable to eat      Report CBG < 60 or > 400 to physician.                                          Insulin Sliding Scale          Glucose  Novolog Insulin Subcutaneous        0 - 60   Orange juice or glucose tablet, hold insulin      No insulin   201-250  2 units   251-300  4 units   301-350  6 units   351-400  8 units   >400   10 units then call physician      Medications: Discontinue all previous medication orders, if any. See new list below.     Hemant Kennedy Jr.   Home Medication Instructions KRYS:[id number]    Printed on:11/05/20 1024   Medication Information                      acetaminophen (TYLENOL) 500 MG tablet  Take 2 tablets (1,000 mg total) by mouth 3 (three) times daily as needed for Pain.             allopurinol (ZYLOPRIM) 300 MG tablet  Take 1 tablet (300 mg total) by mouth every morning.             amiodarone (PACERONE) 200 MG Tab  TAKE 1 TABLET BY MOUTH IN THE MORNING             aspirin 81 MG Chew  TAKE 1 TAB BY MOUTH daily             atorvastatin (LIPITOR) 40 MG tablet  @@ TAKE 1 TABLET BY MOUTH daily             calcium carbonate (OS-ROJAS) 600 mg calcium (1,500 mg) Tab  Take 600 mg by mouth 2 (two)  times a day.             carvediloL (COREG) 6.25 MG tablet  TAKE 1 TABLET BY MOUTH IN THE MORNING and TAKE 1 TABLET BY MOUTH every evening with meals             cefpodoxime (VANTIN) 200 MG tablet  Take 1 tablet (200 mg total) by mouth every 12 (twelve) hours.             collagenase (SANTYL) ointment  Apply topically once daily. Apply to wound daily as directed.             doxazosin (CARDURA) 2 MG tablet  Take 2 mg by mouth once daily.             ELIQUIS 5 mg Tab  TAKE 1 TABLET BY MOUTH twice a day             ergocalciferol (ERGOCALCIFEROL) 50,000 unit Cap  Take 1 capsule (50,000 Units total) by mouth every 7 days.             ferrous sulfate 325 (65 FE) MG EC tablet  Take 1 tablet (325 mg total) by mouth 2 (two) times daily with meals.             fluticasone (FLONASE) 50 mcg/actuation nasal spray  2 sprays by Each Nare route once daily.             furosemide (LASIX) 40 MG tablet  @@ TAKE 1 TABLET BY MOUTH daily             gabapentin (NEURONTIN) 300 MG capsule  Take 1 capsule (300 mg total) by mouth 3 (three) times daily.             glimepiride (AMARYL) 4 MG tablet  Take 4 mg by mouth once daily.             HYDROPHILIC CREAM (TRIAD WOUND DRESSING TOP)  Apply topically daily as needed.             losartan (COZAAR) 25 MG tablet  Take 0.5 tablets (12.5 mg total) by mouth once daily.             multivitamin (ONE DAILY MULTIVITAMIN) per tablet  Take 1 tablet by mouth once daily.             ONE TOUCH ULTRA TEST Strp  Test blood sugar two times daily             oxybutynin (DITROPAN-XL) 10 MG 24 hr tablet  Take 10 mg by mouth once daily.                       _________________________________  Lester Wylie MD  11/05/2020   Vascular stents/Clips

## 2023-10-28 NOTE — ED PROVIDER NOTE - PHYSICAL EXAMINATION
Examination reveals a somewhat anxious older white male in mild acute distress with the following.  HEENT normocephalic atraumatic pupils equal round react light accommodation extraocular intact neck is supple no meningismus no JVD no carotid bruits lungs are clear heart regular rate and rhythm without any murmurs rubs gallops abdomen soft nontender positive bowel sounds extremities without clubbing cyanosis or edema.  Skin is without rash.  Neurologically alert oriented x4 without any focal logic deficits.

## 2023-10-28 NOTE — ED PROVIDER NOTE - OTHER FINDINGS
Okay to send letter for cardiac restratification.   Okay to hold Xarelto 48 hours prior to procedure and resume thereafter ST elevations 2 3 aVF with subtle ST depressions in V1 and V2 suggestive of possible posterior extension of acute inferior STEMI.  Also lateral changes as well.

## 2023-10-28 NOTE — PATIENT PROFILE ADULT - NSPROPTRIGHTNOTIFY_GEN_A_NUR
IP Knee Replacement Physical Therapy Treatment  Plan of Care Note    BASELINE STATUS COMPARED WITH CURRENT STATUS: Patient presents below baseline which was modified independent with mobility with intermittent use of 4WW as needed with mobility. Pt lives alone in senior apartment.     POD# 1  PROCEDURE: L TKA    ASSESSMENT  Currently pt min assist for transfers and gait 85' with ww. Cueing given for transfer sequencing. Min assist on stoop step with cueing for LE sequencing. TKA HEP performed with assist and cueing for technique. Activity tolerance limited by 9/10 L knee pain, RN administered pain medication during session after notification. Will continue skilled therapy to progress mobility, strength, and ROM.    AROM:  R Knee Flexion 0-140: knee AAROM 5-73 (04/18/17 6748)     Recommendations and Plan:  PT Identified Barriers to Discharge: mobiltiy  Recommendation for Discharge: PT: Home;Home therapy (04/17/17 1659)    Treatment Plan for Next Session: HEP-add in seated exercises, bed mobility, transfers, gait with ww, stoop step     Frequency Comments: BID. PATH (HT) DEPT. AVINASH. KRISTIE. R TKA 4/17/17    Precautions:  Precautions  Knee Precautions:  (nerve block worn off) (04/18/17 0735)  Weight Bearing Status: Weight bearing as tolerated left lower extremity (04/17/17 1659)  Weight Bearing Status Comments: knee immobilizer for femoral nerve blcok (04/17/17 1659)  Other Precautions: R TKA on 4/17/17 (04/17/17 1659)    AM-PAC Outcomes - Basic Mobility Domain  How much help from another person does the patient currently need? *  Task Score  Norm   1. Turning from back to side while in flat bed without use of bedrails? 3 - A Little   2. Moving from lying on back to sitting:   3 - A Little   3. Moving to and from a bed to a chair (including wheelchair) 3 - A Little   4. Standing up from a chair using your arms 3 - A Little   5. To walk in a hospital room? 3 - A Little   6. Climbing 3-5 steps with a railing?  2 - A Lot   Raw  Score: 17/24   Converted Score: 39.67 (Raw score = 17)   G code conversion CK: 40- 59% impairment (Raw score: 13-18)     Converted score >42.9 predictive of discharge to home  *Score based on clinical judgment/expected performance, may not have been performed during this session  Scoring Guidelines  1) Patient may use assistive devices unless otherwise indicated in question  2) Do not consider help for management of medical devices only (IV poles, catheters, NG, etc) as part of assist level  3) If activity was not observed and patient unable to do the activity, select \"Total\" .  If the patient can do the activity but was not directly observed, use profession judgment to determine how much assistance would be needed.    Below is key objective and subjective information from the last 24 hours.  For further details and goals, please refer to the PT Assess/Treat/Goals flowsheet.    Diagnosis:  1. Other secondary osteoarthritis of left knee        Co-morbidities:   Patient Active Problem List   Diagnosis   • Family history of malignant neoplasm of gastrointestinal tract   • Migraine with aura, without mention of intractable migraine without mention of status migrainosus   • Allergic rhinitis due to other allergen   • Reflux esophagitis   • Other and unspecified hyperlipidemia   • Spinal stenosis, lumbar region, without neurogenic claudication   • Acquired absence of organ, genital organs   • PERS HX COLONIC POLYPS   • Mass of soft tissue   • Disorder of bone and cartilage, unspecified   • Hyperglycemia   • Colon dysplasia   • Other nonspecific abnormal finding of lung field   • Osteoarthritis of left knee       Precautions Compliance:  good    Prior Living Situation:  Type of Home: Apartment (04/05/17 0800)  Lives With: Alone (04/05/17 0800)    Subjective:  Subjective: pt agreeable to therapy (04/18/17 1518)    Pain:  Comfort/Function (Pain) SCORE with Activity: 9 (04/18/17 1518)  Vitals:  Vital Signs: no adverse  reactions  (04/17/17 1659)  Activity Tolerance:  limited by L knee pain  Exercise:  Exercise Comments: TKA HEP (04/18/17 1518)    Bed Mobility:  Bed Mobility  Supine to Sit: Supervision (Supv) (04/18/17 0735)  Sit to Supine: Minimal Assist (Min) (04/17/17 1659)  Bed Mobility Comments: performed with rehab aide (04/18/17 1518)    Transfers:  Transfers  Sit to Stand: Touching/Steadying Assistance (04/18/17 1518)  Stand to Sit: Touching/Steadying Assistance (04/18/17 1518)  Stand Pivot Transfers: Total Assist - Dependent (04/17/17 1659)  Toilet Transfers: Total Assist - Dependent (04/17/17 1659)  Assistive Device/: 2-wheeled walker (04/18/17 1518)  Transfer Comments 1: cueing for hand placement and to extend L knee (04/18/17 1518)      Gait:  Gait  Gait Assistance: Touching/Steadying Assistance (04/18/17 1518)  Assistive Device/: 2-wheeled walker (04/18/17 1518)  Ambulation Distance (Feet): 85 Feet (04/18/17 1518)  Gait Comments 1: no LOB, step-to pattern due to pain (04/18/17 1518)  Gait Comments 2: additional trial 25' (04/18/17 1518)       Stairs:  Stairs Mobility  Number of Stairs: 1 (04/18/17 1518)  Stair Management Assistance: Moderate Assist (Mod) (04/18/17 1518)  Stair Management Technique: Forwards;With walker (04/18/17 1518)  Stairs Mobility Comments: no LOB, cueing for LE sequencing. min-mod steadying assist (04/18/17 1518)       Education:   On this date, the patient was educated on HEP, bed mobility, transfers, gait with ww, stoop step.    The response to education was: Verbalizes understanding, Demonstrates understanding and Needs reinforcement.         Equipment:  PT/OT Mobility Equipment for Discharge: anticipate no needs, pt owns crutches, walker, and 4WW  (04/17/17 1659)       Therapy Goals  Goals  Short Term Goals to Be Reviewed On: 04/24/17 (04/17/17 1659)  Short Term Goals = Discharge Goals: Yes (04/17/17 1659)  Goal Agreement: Patient agrees with goals and treatment plan  (04/17/17 1659)  Bed Mobility Discharge Goal: mod I  (04/17/17 1659)  Transfer Discharge Goal: mod I with LRAD (04/17/17 1659)  Ambulation Discharge Goal: 150 feet at mod I with LRAD (04/17/17 1659)  Therapeutic Exercise Discharge Goal: independent with TKR HEP (04/17/17 1659)  Other Discharge Goal 1: 0-90 degrees right knee AROM  (04/17/17 1659)     Interventions and Treatment Time:  Treatment/Interventions: Functional transfer training;Strengthening;ROM;Endurance training;Patient/Family training;Equipment eval/education;Bed mobility;Gait training;Safety Education;Neuromuscular re-education (04/17/17 1659)  PT Time Spent: 45 minutes (04/18/17 1518)       declines

## 2023-10-28 NOTE — ED PROVIDER NOTE - NS ED MD DISPO SPECIAL CONSIDERATION1
The antibody infusion clinic will call you tomorrow if you meet the criteria to receive the medication.  Continue Tylenol for aches and fever. Try to stay well hydrated.  If your oxygen saturation drops below 92% and stays there for a few readings, return to the ER as you may then require admission.   None

## 2023-10-28 NOTE — H&P ADULT - PROBLEM SELECTOR PLAN 1
Patient presented with cp and associated diaphoresis,   - EKG significant for inferior JESSI with subtle ST depressions in V1 and V2. Labs notable for troponin 96.2  - S/P  cardiac cath with interventional cardiology Dr. Redding with DWAINE to LAD  - Started on ASA and Brillinta   - Continue heparin sub q   - F/u  lipid panel and TTE (EF 40% on LVgram). Trend trop to peak.   - Patient will require staged PCI to LCx anticipated for Monday.   - Further management as per ICU team and interventional cardiology

## 2023-10-28 NOTE — PATIENT PROFILE ADULT - PUBLIC BENEFITS
Postpartum Note,  Section  She is a  34y woman who is now post-operative day: 1    Subjective:  The patient feels well.  She is ambulating.   She is tolerating regular diet.  She denies nausea and vomiting.  Her pain is controlled.  She reports normal postpartum bleeding.  Francis in place, adequate UOP.  Pt denies HA, n/v, fever/chills, abd pain, changes in vision    Physical exam:    Vital Signs Last 24 Hrs  T(C): 36.4 (2017 06:19), Max: 37.2 (2017 15:30)  T(F): 97.6 (2017 06:19), Max: 99 (2017 15:30)  HR: 67 (2017 06:19) (67 - 93)  BP: 98/51 (2017 06:19) (96/53 - 122/64)  BP(mean): 65 (2017 15:30) (65 - 76)  RR: 18 (2017 06:19) (15 - 23)  SpO2: 95% (2017 06:19) (92% - 97%)    Gen: NAD  Breast: Soft, nontender, not engorged.  Abdomen: Soft, nontender, no distension , firm uterine fundus at umbilicus.  Incision: Clean, dry, and intact with steri strips  Pelvic: Normal lochia noted  Ext: No calf tenderness    LABS:                        10.8   18.03 )-----------( 153      ( 2017 13:00 )             32.5                         12.7   13.67 )-----------( 181      ( 2017 21:43 )             38.5                         12.9   9.14  )-----------( 188      ( 2017 17:40 )             38.6       17 @ 13:00      135  |  98  |  13  ----------------------------<  146<H>  4.2   |  18<L>  |  0.93    17 @ 21:45      135  |  97<L>  |  15  ----------------------------<  89  4.2   |  20<L>  |  0.73        Ca    7.5<L>      2017 13:00  Ca    9.4      2017 21:45    TPro  5.9<L>  /  Alb  3.0<L>  /  TBili  0.2  /  DBili  x   /  AST  26  /  ALT  26  /  AlkPhos  113  17 @ 13:00  TPro  7.2  /  Alb  3.7  /  TBili  0.2  /  DBili  x   /  AST  31  /  ALT  33  /  AlkPhos  138<H>  17 @ 21:45        Allergies    No Known Allergies    Intolerances      MEDICATIONS  (STANDING):  oxytocin Infusion 333.333 milliUNIT(s)/Min (999.999 mL/Hr) IV Continuous <Continuous>  morphine PF Epidural 3 milliGRAM(s) Epidural once  lactated ringers. 1000 milliLiter(s) (125 mL/Hr) IV Continuous <Continuous>  acetaminophen   Tablet 975 milliGRAM(s) Oral every 6 hours  ibuprofen  Tablet 600 milliGRAM(s) Oral every 6 hours  diphtheria/tetanus/pertussis (acellular) Vaccine (ADAcel) 0.5 milliLiter(s) IntraMuscular once  oxytocin Infusion 16.667 milliUNIT(s)/Min (50 mL/Hr) IV Continuous <Continuous>  docusate sodium 100 milliGRAM(s) Oral two times a day  ferrous    sulfate 325 milliGRAM(s) Oral every 12 hours  prenatal multivitamin 1 Tablet(s) Oral daily  ketorolac   Injectable 30 milliGRAM(s) IV Push every 6 hours  heparin  Injectable 5000 Unit(s) SubCutaneous every 12 hours  oxyCODONE IR 5 milliGRAM(s) Oral every 4 hours  magnesium sulfate Infusion 2 Gm/Hr (50 mL/Hr) IV Continuous <Continuous>  labetalol 200 milliGRAM(s) Oral two times a day    MEDICATIONS  (PRN):  oxyCODONE IR 5 milliGRAM(s) Oral every 3 hours PRN Mild Pain  oxyCODONE IR 10 milliGRAM(s) Oral every 3 hours PRN Moderate Pain  HYDROmorphone  Injectable 1 milliGRAM(s) IV Push every 6 hours PRN Severe Pain  naloxone Injectable 0.1 milliGRAM(s) IV Push every 3 minutes PRN For ANY of the following changes in patient status:  A. RR LESS THAN 10 breaths per minute, B. Oxygen saturation LESS THAN 90%, C. Sedation score of 6  ondansetron Injectable 4 milliGRAM(s) IV Push every 6 hours PRN Nausea  simethicone 80 milliGRAM(s) Chew every 4 hours PRN Gas  diphenhydrAMINE   Capsule 25 milliGRAM(s) Oral every 6 hours PRN Itching  glycerin Suppository - Adult 1 Suppository(s) Rectal at bedtime PRN Constipation  lanolin Ointment 1 Application(s) Topical every 3 hours PRN Sore Nipples  oxyCODONE IR 5 milliGRAM(s) Oral every 3 hours PRN Break through Pain        Assessment and Plan  POD #1 s/p  section  Doing well.  Encourage ambulation.  C/w Labetalol 200 BID, monitor BPs  D/c Mg @1230pm no

## 2023-10-28 NOTE — ED PROVIDER NOTE - OBJECTIVE STATEMENT
Patient is 77-year-old male with history of hypertension hyperlipidemia was in his baseline state of good health up until approximately 30 to 45 minutes prior to arrival here when while at rest developed anterior substernal nonradiating chest pressure with slight shortness of breath and slight sweating.  No nausea.  No neck or back pain.  No weakness in any extremity.  Never had pain like this before.  No no abdominal pain.

## 2023-10-29 LAB
A1C WITH ESTIMATED AVERAGE GLUCOSE RESULT: 6.4 % — HIGH (ref 4–5.6)
A1C WITH ESTIMATED AVERAGE GLUCOSE RESULT: 6.4 % — HIGH (ref 4–5.6)
ALBUMIN SERPL ELPH-MCNC: 3.8 G/DL — SIGNIFICANT CHANGE UP (ref 3.3–5)
ALBUMIN SERPL ELPH-MCNC: 3.8 G/DL — SIGNIFICANT CHANGE UP (ref 3.3–5)
ALP SERPL-CCNC: 65 U/L — SIGNIFICANT CHANGE UP (ref 40–120)
ALP SERPL-CCNC: 65 U/L — SIGNIFICANT CHANGE UP (ref 40–120)
ALT FLD-CCNC: 31 U/L — SIGNIFICANT CHANGE UP (ref 12–78)
ALT FLD-CCNC: 31 U/L — SIGNIFICANT CHANGE UP (ref 12–78)
ANION GAP SERPL CALC-SCNC: 6 MMOL/L — SIGNIFICANT CHANGE UP (ref 5–17)
ANION GAP SERPL CALC-SCNC: 6 MMOL/L — SIGNIFICANT CHANGE UP (ref 5–17)
ANION GAP SERPL CALC-SCNC: 7 MMOL/L — SIGNIFICANT CHANGE UP (ref 5–17)
ANION GAP SERPL CALC-SCNC: 7 MMOL/L — SIGNIFICANT CHANGE UP (ref 5–17)
AST SERPL-CCNC: 64 U/L — HIGH (ref 15–37)
AST SERPL-CCNC: 64 U/L — HIGH (ref 15–37)
BASOPHILS # BLD AUTO: 0.04 K/UL — SIGNIFICANT CHANGE UP (ref 0–0.2)
BASOPHILS # BLD AUTO: 0.04 K/UL — SIGNIFICANT CHANGE UP (ref 0–0.2)
BASOPHILS NFR BLD AUTO: 0.3 % — SIGNIFICANT CHANGE UP (ref 0–2)
BASOPHILS NFR BLD AUTO: 0.3 % — SIGNIFICANT CHANGE UP (ref 0–2)
BILIRUB SERPL-MCNC: 0.8 MG/DL — SIGNIFICANT CHANGE UP (ref 0.2–1.2)
BILIRUB SERPL-MCNC: 0.8 MG/DL — SIGNIFICANT CHANGE UP (ref 0.2–1.2)
BUN SERPL-MCNC: 16 MG/DL — SIGNIFICANT CHANGE UP (ref 7–23)
CALCIUM SERPL-MCNC: 9.1 MG/DL — SIGNIFICANT CHANGE UP (ref 8.5–10.1)
CALCIUM SERPL-MCNC: 9.1 MG/DL — SIGNIFICANT CHANGE UP (ref 8.5–10.1)
CALCIUM SERPL-MCNC: 9.2 MG/DL — SIGNIFICANT CHANGE UP (ref 8.5–10.1)
CALCIUM SERPL-MCNC: 9.2 MG/DL — SIGNIFICANT CHANGE UP (ref 8.5–10.1)
CHLORIDE SERPL-SCNC: 102 MMOL/L — SIGNIFICANT CHANGE UP (ref 96–108)
CHLORIDE SERPL-SCNC: 102 MMOL/L — SIGNIFICANT CHANGE UP (ref 96–108)
CHLORIDE SERPL-SCNC: 103 MMOL/L — SIGNIFICANT CHANGE UP (ref 96–108)
CHLORIDE SERPL-SCNC: 103 MMOL/L — SIGNIFICANT CHANGE UP (ref 96–108)
CHOLEST SERPL-MCNC: 162 MG/DL — SIGNIFICANT CHANGE UP
CHOLEST SERPL-MCNC: 162 MG/DL — SIGNIFICANT CHANGE UP
CK SERPL-CCNC: 453 U/L — HIGH (ref 26–308)
CK SERPL-CCNC: 453 U/L — HIGH (ref 26–308)
CO2 SERPL-SCNC: 27 MMOL/L — SIGNIFICANT CHANGE UP (ref 22–31)
CO2 SERPL-SCNC: 27 MMOL/L — SIGNIFICANT CHANGE UP (ref 22–31)
CO2 SERPL-SCNC: 29 MMOL/L — SIGNIFICANT CHANGE UP (ref 22–31)
CO2 SERPL-SCNC: 29 MMOL/L — SIGNIFICANT CHANGE UP (ref 22–31)
CREAT SERPL-MCNC: 0.85 MG/DL — SIGNIFICANT CHANGE UP (ref 0.5–1.3)
CREAT SERPL-MCNC: 0.85 MG/DL — SIGNIFICANT CHANGE UP (ref 0.5–1.3)
CREAT SERPL-MCNC: 1.1 MG/DL — SIGNIFICANT CHANGE UP (ref 0.5–1.3)
CREAT SERPL-MCNC: 1.1 MG/DL — SIGNIFICANT CHANGE UP (ref 0.5–1.3)
EGFR: 70 ML/MIN/1.73M2 — SIGNIFICANT CHANGE UP
EGFR: 70 ML/MIN/1.73M2 — SIGNIFICANT CHANGE UP
EGFR: 91 ML/MIN/1.73M2 — SIGNIFICANT CHANGE UP
EGFR: 91 ML/MIN/1.73M2 — SIGNIFICANT CHANGE UP
EOSINOPHIL # BLD AUTO: 0.07 K/UL — SIGNIFICANT CHANGE UP (ref 0–0.5)
EOSINOPHIL # BLD AUTO: 0.07 K/UL — SIGNIFICANT CHANGE UP (ref 0–0.5)
EOSINOPHIL NFR BLD AUTO: 0.6 % — SIGNIFICANT CHANGE UP (ref 0–6)
EOSINOPHIL NFR BLD AUTO: 0.6 % — SIGNIFICANT CHANGE UP (ref 0–6)
ESTIMATED AVERAGE GLUCOSE: 137 MG/DL — HIGH (ref 68–114)
ESTIMATED AVERAGE GLUCOSE: 137 MG/DL — HIGH (ref 68–114)
GLUCOSE SERPL-MCNC: 120 MG/DL — HIGH (ref 70–99)
GLUCOSE SERPL-MCNC: 120 MG/DL — HIGH (ref 70–99)
GLUCOSE SERPL-MCNC: 142 MG/DL — HIGH (ref 70–99)
GLUCOSE SERPL-MCNC: 142 MG/DL — HIGH (ref 70–99)
HCT VFR BLD CALC: 46.5 % — SIGNIFICANT CHANGE UP (ref 39–50)
HCT VFR BLD CALC: 46.5 % — SIGNIFICANT CHANGE UP (ref 39–50)
HDLC SERPL-MCNC: 55 MG/DL — SIGNIFICANT CHANGE UP
HDLC SERPL-MCNC: 55 MG/DL — SIGNIFICANT CHANGE UP
HGB BLD-MCNC: 15.2 G/DL — SIGNIFICANT CHANGE UP (ref 13–17)
HGB BLD-MCNC: 15.2 G/DL — SIGNIFICANT CHANGE UP (ref 13–17)
IMM GRANULOCYTES NFR BLD AUTO: 0.4 % — SIGNIFICANT CHANGE UP (ref 0–0.9)
IMM GRANULOCYTES NFR BLD AUTO: 0.4 % — SIGNIFICANT CHANGE UP (ref 0–0.9)
LIPID PNL WITH DIRECT LDL SERPL: 96 MG/DL — SIGNIFICANT CHANGE UP
LIPID PNL WITH DIRECT LDL SERPL: 96 MG/DL — SIGNIFICANT CHANGE UP
LYMPHOCYTES # BLD AUTO: 1.04 K/UL — SIGNIFICANT CHANGE UP (ref 1–3.3)
LYMPHOCYTES # BLD AUTO: 1.04 K/UL — SIGNIFICANT CHANGE UP (ref 1–3.3)
LYMPHOCYTES # BLD AUTO: 8.8 % — LOW (ref 13–44)
LYMPHOCYTES # BLD AUTO: 8.8 % — LOW (ref 13–44)
MAGNESIUM SERPL-MCNC: 2 MG/DL — SIGNIFICANT CHANGE UP (ref 1.6–2.6)
MAGNESIUM SERPL-MCNC: 2 MG/DL — SIGNIFICANT CHANGE UP (ref 1.6–2.6)
MAGNESIUM SERPL-MCNC: 2.3 MG/DL — SIGNIFICANT CHANGE UP (ref 1.6–2.6)
MAGNESIUM SERPL-MCNC: 2.3 MG/DL — SIGNIFICANT CHANGE UP (ref 1.6–2.6)
MCHC RBC-ENTMCNC: 27.5 PG — SIGNIFICANT CHANGE UP (ref 27–34)
MCHC RBC-ENTMCNC: 27.5 PG — SIGNIFICANT CHANGE UP (ref 27–34)
MCHC RBC-ENTMCNC: 32.7 GM/DL — SIGNIFICANT CHANGE UP (ref 32–36)
MCHC RBC-ENTMCNC: 32.7 GM/DL — SIGNIFICANT CHANGE UP (ref 32–36)
MCV RBC AUTO: 84.2 FL — SIGNIFICANT CHANGE UP (ref 80–100)
MCV RBC AUTO: 84.2 FL — SIGNIFICANT CHANGE UP (ref 80–100)
MONOCYTES # BLD AUTO: 1.09 K/UL — HIGH (ref 0–0.9)
MONOCYTES # BLD AUTO: 1.09 K/UL — HIGH (ref 0–0.9)
MONOCYTES NFR BLD AUTO: 9.2 % — SIGNIFICANT CHANGE UP (ref 2–14)
MONOCYTES NFR BLD AUTO: 9.2 % — SIGNIFICANT CHANGE UP (ref 2–14)
NEUTROPHILS # BLD AUTO: 9.54 K/UL — HIGH (ref 1.8–7.4)
NEUTROPHILS # BLD AUTO: 9.54 K/UL — HIGH (ref 1.8–7.4)
NEUTROPHILS NFR BLD AUTO: 80.7 % — HIGH (ref 43–77)
NEUTROPHILS NFR BLD AUTO: 80.7 % — HIGH (ref 43–77)
NON HDL CHOLESTEROL: 107 MG/DL — SIGNIFICANT CHANGE UP
NON HDL CHOLESTEROL: 107 MG/DL — SIGNIFICANT CHANGE UP
NRBC # BLD: 0 /100 WBCS — SIGNIFICANT CHANGE UP (ref 0–0)
NRBC # BLD: 0 /100 WBCS — SIGNIFICANT CHANGE UP (ref 0–0)
PHOSPHATE SERPL-MCNC: 2.1 MG/DL — LOW (ref 2.5–4.5)
PHOSPHATE SERPL-MCNC: 2.1 MG/DL — LOW (ref 2.5–4.5)
PHOSPHATE SERPL-MCNC: 3.1 MG/DL — SIGNIFICANT CHANGE UP (ref 2.5–4.5)
PHOSPHATE SERPL-MCNC: 3.1 MG/DL — SIGNIFICANT CHANGE UP (ref 2.5–4.5)
PLATELET # BLD AUTO: 220 K/UL — SIGNIFICANT CHANGE UP (ref 150–400)
PLATELET # BLD AUTO: 220 K/UL — SIGNIFICANT CHANGE UP (ref 150–400)
POTASSIUM SERPL-MCNC: 3.9 MMOL/L — SIGNIFICANT CHANGE UP (ref 3.5–5.3)
POTASSIUM SERPL-MCNC: 3.9 MMOL/L — SIGNIFICANT CHANGE UP (ref 3.5–5.3)
POTASSIUM SERPL-MCNC: 4.3 MMOL/L — SIGNIFICANT CHANGE UP (ref 3.5–5.3)
POTASSIUM SERPL-MCNC: 4.3 MMOL/L — SIGNIFICANT CHANGE UP (ref 3.5–5.3)
POTASSIUM SERPL-SCNC: 3.9 MMOL/L — SIGNIFICANT CHANGE UP (ref 3.5–5.3)
POTASSIUM SERPL-SCNC: 3.9 MMOL/L — SIGNIFICANT CHANGE UP (ref 3.5–5.3)
POTASSIUM SERPL-SCNC: 4.3 MMOL/L — SIGNIFICANT CHANGE UP (ref 3.5–5.3)
POTASSIUM SERPL-SCNC: 4.3 MMOL/L — SIGNIFICANT CHANGE UP (ref 3.5–5.3)
PROT SERPL-MCNC: 7.5 G/DL — SIGNIFICANT CHANGE UP (ref 6–8.3)
PROT SERPL-MCNC: 7.5 G/DL — SIGNIFICANT CHANGE UP (ref 6–8.3)
RBC # BLD: 5.52 M/UL — SIGNIFICANT CHANGE UP (ref 4.2–5.8)
RBC # BLD: 5.52 M/UL — SIGNIFICANT CHANGE UP (ref 4.2–5.8)
RBC # FLD: 13.2 % — SIGNIFICANT CHANGE UP (ref 10.3–14.5)
RBC # FLD: 13.2 % — SIGNIFICANT CHANGE UP (ref 10.3–14.5)
SODIUM SERPL-SCNC: 135 MMOL/L — SIGNIFICANT CHANGE UP (ref 135–145)
SODIUM SERPL-SCNC: 135 MMOL/L — SIGNIFICANT CHANGE UP (ref 135–145)
SODIUM SERPL-SCNC: 139 MMOL/L — SIGNIFICANT CHANGE UP (ref 135–145)
SODIUM SERPL-SCNC: 139 MMOL/L — SIGNIFICANT CHANGE UP (ref 135–145)
TRIGL SERPL-MCNC: 51 MG/DL — SIGNIFICANT CHANGE UP
TRIGL SERPL-MCNC: 51 MG/DL — SIGNIFICANT CHANGE UP
TROPONIN I, HIGH SENSITIVITY RESULT: HIGH NG/L
WBC # BLD: 11.83 K/UL — HIGH (ref 3.8–10.5)
WBC # BLD: 11.83 K/UL — HIGH (ref 3.8–10.5)
WBC # FLD AUTO: 11.83 K/UL — HIGH (ref 3.8–10.5)
WBC # FLD AUTO: 11.83 K/UL — HIGH (ref 3.8–10.5)

## 2023-10-29 PROCEDURE — 93306 TTE W/DOPPLER COMPLETE: CPT | Mod: 26

## 2023-10-29 PROCEDURE — 93010 ELECTROCARDIOGRAM REPORT: CPT | Mod: 76

## 2023-10-29 PROCEDURE — 99233 SBSQ HOSP IP/OBS HIGH 50: CPT | Mod: GC

## 2023-10-29 PROCEDURE — 99291 CRITICAL CARE FIRST HOUR: CPT

## 2023-10-29 PROCEDURE — 93010 ELECTROCARDIOGRAM REPORT: CPT | Mod: 77

## 2023-10-29 RX ORDER — LEVOTHYROXINE SODIUM 125 MCG
88 TABLET ORAL DAILY
Refills: 0 | Status: DISCONTINUED | OUTPATIENT
Start: 2023-10-30 | End: 2023-10-31

## 2023-10-29 RX ORDER — ASPIRIN/CALCIUM CARB/MAGNESIUM 324 MG
81 TABLET ORAL DAILY
Refills: 0 | Status: DISCONTINUED | OUTPATIENT
Start: 2023-10-29 | End: 2023-10-30

## 2023-10-29 RX ORDER — ENOXAPARIN SODIUM 100 MG/ML
40 INJECTION SUBCUTANEOUS EVERY 24 HOURS
Refills: 0 | Status: DISCONTINUED | OUTPATIENT
Start: 2023-10-29 | End: 2023-10-30

## 2023-10-29 RX ORDER — POTASSIUM PHOSPHATE, MONOBASIC POTASSIUM PHOSPHATE, DIBASIC 236; 224 MG/ML; MG/ML
15 INJECTION, SOLUTION INTRAVENOUS ONCE
Refills: 0 | Status: COMPLETED | OUTPATIENT
Start: 2023-10-29 | End: 2023-10-29

## 2023-10-29 RX ORDER — METOPROLOL TARTRATE 50 MG
50 TABLET ORAL
Refills: 0 | Status: DISCONTINUED | OUTPATIENT
Start: 2023-10-29 | End: 2023-10-30

## 2023-10-29 RX ORDER — METOPROLOL TARTRATE 50 MG
25 TABLET ORAL
Refills: 0 | Status: DISCONTINUED | OUTPATIENT
Start: 2023-10-29 | End: 2023-10-29

## 2023-10-29 RX ORDER — ACETAMINOPHEN 500 MG
650 TABLET ORAL EVERY 6 HOURS
Refills: 0 | Status: COMPLETED | OUTPATIENT
Start: 2023-10-29 | End: 2023-10-29

## 2023-10-29 RX ORDER — MAGNESIUM SULFATE 500 MG/ML
1 VIAL (ML) INJECTION ONCE
Refills: 0 | Status: COMPLETED | OUTPATIENT
Start: 2023-10-29 | End: 2023-10-29

## 2023-10-29 RX ORDER — POTASSIUM CHLORIDE 20 MEQ
20 PACKET (EA) ORAL ONCE
Refills: 0 | Status: COMPLETED | OUTPATIENT
Start: 2023-10-29 | End: 2023-10-29

## 2023-10-29 RX ORDER — METOPROLOL TARTRATE 50 MG
25 TABLET ORAL ONCE
Refills: 0 | Status: COMPLETED | OUTPATIENT
Start: 2023-10-29 | End: 2023-10-29

## 2023-10-29 RX ADMIN — ATORVASTATIN CALCIUM 80 MILLIGRAM(S): 80 TABLET, FILM COATED ORAL at 21:07

## 2023-10-29 RX ADMIN — ENOXAPARIN SODIUM 40 MILLIGRAM(S): 100 INJECTION SUBCUTANEOUS at 13:46

## 2023-10-29 RX ADMIN — Medication 81 MILLIGRAM(S): at 11:05

## 2023-10-29 RX ADMIN — Medication 650 MILLIGRAM(S): at 17:28

## 2023-10-29 RX ADMIN — CHOLESTYRAMINE 4 GRAM(S): 4 POWDER, FOR SUSPENSION ORAL at 09:08

## 2023-10-29 RX ADMIN — POTASSIUM PHOSPHATE, MONOBASIC POTASSIUM PHOSPHATE, DIBASIC 62.5 MILLIMOLE(S): 236; 224 INJECTION, SOLUTION INTRAVENOUS at 11:05

## 2023-10-29 RX ADMIN — Medication 25 MILLIGRAM(S): at 07:56

## 2023-10-29 RX ADMIN — HEPARIN SODIUM 5000 UNIT(S): 5000 INJECTION INTRAVENOUS; SUBCUTANEOUS at 05:21

## 2023-10-29 RX ADMIN — Medication 50 MILLIGRAM(S): at 17:28

## 2023-10-29 RX ADMIN — Medication 650 MILLIGRAM(S): at 12:23

## 2023-10-29 RX ADMIN — TICAGRELOR 90 MILLIGRAM(S): 90 TABLET ORAL at 09:08

## 2023-10-29 RX ADMIN — Medication 20 MILLIEQUIVALENT(S): at 13:46

## 2023-10-29 RX ADMIN — Medication 650 MILLIGRAM(S): at 11:05

## 2023-10-29 RX ADMIN — TICAGRELOR 90 MILLIGRAM(S): 90 TABLET ORAL at 21:07

## 2023-10-29 RX ADMIN — Medication 100 GRAM(S): at 13:46

## 2023-10-29 RX ADMIN — Medication 75 MICROGRAM(S): at 05:21

## 2023-10-29 RX ADMIN — Medication 25 MILLIGRAM(S): at 11:06

## 2023-10-29 NOTE — DIETITIAN INITIAL EVALUATION ADULT - OTHER INFO
76 y/o Male with HTN, HLD, Hypothyroidism, Ulcerative Colitis presents to  ED complaining of with acute onset substernal chest pain, +Anterior STEMI.    Pt A+Ox4 during visit. Dash/TLC diet rx. Well tolerated with good appetite/po intake. GI wdl. BM 10/27. Encourage dietary fiber for bowel regularity. Pta follows heart healthy diet and exercises 3-4x/week. UBW 165lbs. Stable. Typically consumes 2 main meals daily plus 1-2 snacks. Breakfast- egg whites, whole wheat toast, 1 cup coffee with skim milk and Dinner large salad, vegetables with meat or chicken as protein. Typical snacks fruit/vegetable smoothies, maya crackers, oatmeal crackers. Per labs lipid profile wdl, Hgba1c 6.4% (indicating pre-diabetes category). Pt states this is higher than usual (typically 5.7-6.0). Verbal heart healthy consistent carbohydrate nutrition therapy provided with good understanding. Declined written instructions.

## 2023-10-29 NOTE — DIETITIAN INITIAL EVALUATION ADULT - PERTINENT LABORATORY DATA
10-29    135  |  102  |  16  ----------------------------<  142<H>  3.9   |  27  |  0.85    Ca    9.1      29 Oct 2023 08:23  Phos  2.1     10-29  Mg     2.0     10-29    TPro  7.5  /  Alb  3.8  /  TBili  0.8  /  DBili  x   /  AST  64<H>  /  ALT  31  /  AlkPhos  65  10-29  A1C with Estimated Average Glucose Result: 6.4 % (10-28-23 @ 18:11)

## 2023-10-29 NOTE — PHARMACOTHERAPY INTERVENTION NOTE - COMMENTS
Medication reconciliation completed. The following discrepancies were discussed with ICU Dr. Bangura:    Current order                                       Home Medication  levothyroxine 75 mcg daily                    levothyroxine 88 mcg daily  cholestyramine                                     no longer taking  mesalamine                                          no longer taking    MD aware, recommended adjusting levothyroxine to home dose of 88 mcg daily and discontinuing cholestyramine and mesalamine.     Medication reconciliation completed. The following discrepancies were discussed with ICU Dr. Bangura:    Current order                                       Home Medication  levothyroxine 75 mcg daily                   levothyroxine 88 mcg daily  cholestyramine                                     no longer taking  mesalamine                                          no longer taking    MD aware, recommended adjusting levothyroxine to home dose of 88 mcg daily and discontinuing cholestyramine and mesalamine.

## 2023-10-29 NOTE — CONSULT NOTE ADULT - SUBJECTIVE AND OBJECTIVE BOX
[facility] - Jenni Wilkins, Trent Aragon Savella, [place]  Office Number: [phone]    Initial Consult Note    CHIEF COMPLAINT: Patient is a 77y old  Male who presents with a chief complaint of Anterior STEMI (28 Oct 2023 16:54)      HPI:  77-year-old male with history of HTN, HLD as in his baseline state of good health up until approximately 30 to 45 minutes prior to arrival here when while at rest developed anterior substernal nonradiating chest pressure/heaviness that was persisting with slight shortness of breath and slight sweating. On presentation to the ED, the  EKG was significant for inferior/lat ST elevation with subtle ST depressions in V1 and V2. CArdiac cath performed 10/28/2023 s/p DWAINE x3 to LAD.  Planned for staged Cx intervention mon 10/30/2023.  Patient has been transferred to ICU post catherization. He currently denies any fever, HA, cp, sob, abd pain, N/V/D/C. Pt states chest pain has improved and now feels like chest soreness.  Tele NSR NSVT 5 beats x1    ED course:   Vitals : T 97.7 /77, RR 25, SpO2 95% RA  HR 84  Labs: troponin 96.2, WBC 11.50  EKG:  inferior/lat ST elevation with subtle ST depressions in V1 and V2.          (28 Oct 2023 17:11)      PAST MEDICAL & SURGICAL HISTORY:  Hypothyroid      Colitis      History of left hip replacement          SOCIAL HISTORY:  No tobacco, ethanol, or drug abuse.    FAMILY HISTORY:  No pertinent family history in first degree relatives      No family history of acute MI or sudden cardiac death.    MEDICATIONS  (STANDING):  aspirin  chewable 81 milliGRAM(s) Oral daily  atorvastatin 80 milliGRAM(s) Oral at bedtime  chlorhexidine 2% Cloths 1 Application(s) Topical <User Schedule>  cholestyramine Powder (Sugar-Free) 4 Gram(s) Oral daily  heparin   Injectable 5000 Unit(s) SubCutaneous every 8 hours  levothyroxine 75 MICROGram(s) Oral daily  mesalamine DR Capsule 1200 milliGRAM(s) Oral three times a day  mesalamine Suppository 1000 milliGRAM(s) Rectal at bedtime  metoprolol tartrate 25 milliGRAM(s) Oral two times a day  ticagrelor 90 milliGRAM(s) Oral every 12 hours    MEDICATIONS  (PRN):  zolpidem 5 milliGRAM(s) Oral at bedtime PRN Insomnia      Allergies    penicillin (Swelling)    Intolerances        REVIEW OF SYSTEMS:    CONSTITUTIONAL: No weakness, fevers or chills  EYES/ENT: No visual changes;  No vertigo or throat pain   NECK: No pain or stiffness  RESPIRATORY: No cough, wheezing, hemoptysis; No shortness of breath  CARDIOVASCULAR: No chest pain or palpitations  GASTROINTESTINAL: No abdominal pain. No nausea, vomiting, or hematemesis; No diarrhea or constipation. No melena or hematochezia.  GENITOURINARY: No dysuria, frequency or hematuria  NEUROLOGICAL: No numbness or weakness  SKIN: No itching or rash  All other review of systems is negative unless indicated above    VITAL SIGNS:   Vital Signs Last 24 Hrs  T(C): 36.6 (29 Oct 2023 07:55), Max: 37 (28 Oct 2023 23:54)  T(F): 97.8 (29 Oct 2023 07:55), Max: 98.6 (28 Oct 2023 23:54)  HR: 68 (29 Oct 2023 08:00) (62 - 117)  BP: 129/76 (29 Oct 2023 08:00) (127/68 - 170/89)  BP(mean): 98 (29 Oct 2023 08:00) (88 - 125)  RR: 19 (29 Oct 2023 08:00) (15 - 39)  SpO2: 95% (29 Oct 2023 08:00) (94% - 98%)    Parameters below as of 29 Oct 2023 08:00  Patient On (Oxygen Delivery Method): room air        I&O's Summary    28 Oct 2023 07:01  -  29 Oct 2023 07:00  --------------------------------------------------------  IN: 0 mL / OUT: 1300 mL / NET: -1300 mL        On Exam:    Constitutional: NAD, alert and oriented x 3  Lungs:  Non-labored, breath sounds are clear bilaterally, No wheezing, rales or rhonchi  Cardiovascular: RRR.  S1 and S2 positive.  No murmurs, rubs, gallops or clicks  Gastrointestinal: Bowel Sounds present, soft, nontender.   Lymph: No peripheral edema. No cervical lymphadenopathy.  Neurological: Alert, no focal deficits  Skin: No rashes or ulcers   Psych:  Mood & affect appropriate.    LABS: All Labs Reviewed:                        15.4   11.50 )-----------( 259      ( 28 Oct 2023 15:30 )             48.0     28 Oct 2023 15:30    140    |  104    |  19     ----------------------------<  135    4.4     |  28     |  1.10     Ca    9.7        28 Oct 2023 15:30  Mg     1.7       28 Oct 2023 15:30    TPro  7.7    /  Alb  4.1    /  TBili  0.5    /  DBili  x      /  AST  18     /  ALT  28     /  AlkPhos  59     28 Oct 2023 15:30    PT/INR - ( 28 Oct 2023 15:30 )   PT: 11.9 sec;   INR: 1.02 ratio         PTT - ( 28 Oct 2023 15:30 )  PTT:33.4 sec      Blood Culture:     10-28 @ 18:11  TSH: 0.41      RADIOLOGY:    EKG:

## 2023-10-29 NOTE — PROGRESS NOTE ADULT - ASSESSMENT
76 y/o Male with HTN, HLD, Hypothyroidism, Ulcerative Colitis presents to  ED complaining of with acute onset substernal chest pain with:      1. Anterior STEMI         Neuro: Alert and oriented, mentating at baseline. Avoid delirogenic medications. Pain control PRN.   Resp: Maintaining O2>93% on NC. Actively titrating FiO2 as tolerated. Aspiration precautions.  CV: EKG on admission with inferior ST elevation with subtle ST depressions in V1 and V2. Taken emergently to Cath Lab, s/p DWAINE x3 to LAD. Radial band removed, no hematoma. Pulses and sensation intact. DAPT w/ ASA and Brilinta to promote stent patentcy. Plan for staged PCI to LCx on Monday. TTE ordered, results pending. Estimated EF 40% on LV gram. Initiated on Statin. Lipid panel in AM. Monitor clinical and laboratory end points of perfusion, maintain MAP >65.   GI: Dash diet, tolerating well. GI prophylaxis with Protonix.  Renal: Stable renal indicies. s/p gentle hydration for renal protection from contraction load. Voiding independently. Adequate urine output. Trend labs, replete lytes as needed to maintain K>4, Mg>3, Phos >2.  Endo: Thyroid panel in AM. Maintain euthyroid. C/W Synthroid.  ID: Leukocytosis, afebrile. No overt s/s of underlying infectious etiology. Likely reactive. Monitor off abx. Trend WBC and fevers.   Heme: H/H stable. Trend H/H, transfuse for hgb <7.0 if necessary. No need for Full AC per Interventionalist. DVT prophylaxis with Heparin. SCD in place.

## 2023-10-29 NOTE — DIETITIAN INITIAL EVALUATION ADULT - PERSON TAUGHT/METHOD
Verbal heart healthy consistent carbohydrate nutrition therapy provided. Declined written instructions.

## 2023-10-29 NOTE — CONSULT NOTE ADULT - ASSESSMENT
78 y/o Male with HTN, HLD, Hypothyroidism, Ulcerative Colitis presents to  ED complaining of with acute onset substernal chest pain found to have STEMI      EKG on admission with inferior/lat ST elevation with subtle ST depressions in V1 and V2.   Taken emergently to Cath Lab, s/p DWAINE x3 to LAD. Radial band removed, no hematoma.   Plan for staged PCI to LCx on Monday.  c/w DAPT - ASA and Brilinta    Estimated EF 40% on LV gram.  TTE ordered   c/w high dose Statin.  start metoprolol  tele monitoring  monitor hemodynamics carefully    - Monitor and replete lytes, keep K>4 and Mg >2  - Further cardiac workup will depend on clinical course.   - All other workup per primary team  - Thank you for this consult!

## 2023-10-29 NOTE — PROGRESS NOTE ADULT - SUBJECTIVE AND OBJECTIVE BOX
Patient is a 77y old  Male who presents with a chief complaint of Anterior STEMI (28 Oct 2023 16:54)      BRIEF HOSPITAL COURSE-        Events last 24 hours:        Review of Systems:  CONSTITUTIONAL: No fever, chills, or fatigue.  EYES: No eye pain, visual disturbances, or discharge.  ENMT:  No difficulty hearing, tinnitus, vertigo. No sinus or throat pain.  NECK: No pain or stiffness.  RESPIRATORY: No cough, wheezing, chills or hemoptysis. No shortness of breath.  CARDIOVASCULAR: No chest pain, palpitations, dizziness, or leg swelling.  GASTROINTESTINAL: No abdominal or epigastric pain. No nausea, vomiting, or hematemesis. No diarrhea or constipation. No melena or hematochezia.  GENITOURINARY: No dysuria, frequency, hematuria, or incontinence.  NEUROLOGICAL: No headaches, memory loss, loss of strength, numbness, or tremors.  SKIN: No itching, burning, rashes, or lesions.   MUSCULOSKELETAL: No joint pain or swelling. No muscle, back, or extremity pain.  PSYCHIATRIC: No depression, anxiety, mood swings, or difficulty sleeping.        PAST MEDICAL & SURGICAL HISTORY-  Hypothyroid      Colitis      History of left hip replacement          Medications:        zolpidem 5 milliGRAM(s) Oral at bedtime PRN      aspirin  chewable 81 milliGRAM(s) Oral daily  heparin   Injectable 5000 Unit(s) SubCutaneous every 8 hours  ticagrelor 90 milliGRAM(s) Oral every 12 hours    mesalamine DR Capsule 1200 milliGRAM(s) Oral three times a day  mesalamine Suppository 1000 milliGRAM(s) Rectal at bedtime      atorvastatin 80 milliGRAM(s) Oral at bedtime  cholestyramine Powder (Sugar-Free) 4 Gram(s) Oral daily  levothyroxine 75 MICROGram(s) Oral daily        chlorhexidine 2% Cloths 1 Application(s) Topical <User Schedule>            ICU Vital Signs Last 24 Hrs  T(C): 37 (28 Oct 2023 23:54), Max: 37 (28 Oct 2023 23:54)  T(F): 98.6 (28 Oct 2023 23:54), Max: 98.6 (28 Oct 2023 23:54)  HR: 62 (29 Oct 2023 01:00) (62 - 117)  BP: 152/78 (29 Oct 2023 01:00) (140/73 - 170/89)  BP(mean): 107 (29 Oct 2023 01:00) (92 - 125)  ABP: --  ABP(mean): --  RR: 16 (29 Oct 2023 01:00) (16 - 39)  SpO2: 96% (29 Oct 2023 01:00) (94% - 98%)    O2 Parameters below as of 28 Oct 2023 19:00  Patient On (Oxygen Delivery Method): room air                I&O's Detail    28 Oct 2023 07:01  -  29 Oct 2023 01:53  --------------------------------------------------------  IN:  Total IN: 0 mL    OUT:    Voided (mL): 500 mL  Total OUT: 500 mL    Total NET: -500 mL          LABS:                        15.4   11.50 )-----------( 259      ( 28 Oct 2023 15:30 )             48.0     10-28    140  |  104  |  19  ----------------------------<  135<H>  4.4   |  28  |  1.10    Ca    9.7      28 Oct 2023 15:30  Mg     1.7     10-28    TPro  7.7  /  Alb  4.1  /  TBili  0.5  /  DBili  x   /  AST  18  /  ALT  28  /  AlkPhos  59  10-28      CAPILLARY BLOOD GLUCOSE      PT/INR - ( 28 Oct 2023 15:30 )   PT: 11.9 sec;   INR: 1.02 ratio    PTT - ( 28 Oct 2023 15:30 )  PTT:33.4 sec      Urinalysis Basic - ( 28 Oct 2023 15:30 )  Color: x / Appearance: x / SG: x / pH: x  Gluc: 135 mg/dL / Ketone: x  / Bili: x / Urobili: x   Blood: x / Protein: x / Nitrite: x   Leuk Esterase: x / RBC: x / WBC x   Sq Epi: x / Non Sq Epi: x / Bacteria: x      CULTURES:      Physical Examination:  General: No acute distress.    HEENT: Pupils equal, reactive to light.  Symmetric.  PULM: Clear to auscultation bilaterally, no significant sputum production  NECK: Supple, no lymphadenopathy, trachea midline  CVS: Regular rate and rhythm, no murmurs, rubs, or gallops  ABD: Soft, nondistended, nontender, normoactive bowel sounds, no masses  EXT: No edema, nontender  SKIN: Warm and well perfused, no rashes noted.  NEURO: Alert, oriented, interactive, nonfocal  DEVICES:       RADIOLOGY-    < from: Xray Chest 1 View- PORTABLE-Urgent (10.28.23 @ 15:42) >  ACC: [id number] EXAM:  XR CHEST PORTABLE URGENT 1V   ORDERED BY: BARBARA JOHANSEN     PROCEDURE DATE:  10/28/2023      INTERPRETATION:  AP chest on October 28, 2023 at 3:33 PM. Patient has   chest pain.    COMPARISON: None available.    Heart size is within normal limits.    Lungs are clear.    IMPRESSION: Negative chest.    --- End of Report ---    CARL MIXON MD; Attending Radiologist  This document has been electronically signed. Oct 28 2023  3:44PM    < end of copied text >         Patient is a 77y old  Male who presents with a chief complaint of Anterior STEMI (28 Oct 2023 16:54)      BRIEF HOSPITAL COURSE-  78 y/o Male with HTN, HLD, Hypothyroidism, Ulcerative Colitis presents to  ED complaining of with acute onset substernal chest pain. Admits to associated SOB and diaphoresis that began approximately 30-45 minutes prior to arrival. EKG significant for inferior JESSI with subtle ST depressions in V1 and V2. +Troponin on admission. CODE STEMI activated in ED. Taken emergently to CATH Lab, s/p DWAINE x3 to LAD by Dr Redding. Admitted to ICU for post-[facility] management of STEMI.      Events last 24 hours:  Radial band removed last night, no hematoma and pulses intact. Chest resolved, endorsing minimal chest discomfort. Troponin uptrending.       Review of Systems:  CONSTITUTIONAL: No fever, chills, or fatigue.  EYES: No eye pain, visual disturbances, or discharge.  ENMT:  No difficulty hearing, tinnitus, vertigo. No sinus or throat pain.  NECK: No pain or stiffness.  RESPIRATORY: No cough, wheezing, chills or hemoptysis. No shortness of breath.  CARDIOVASCULAR: No chest pain, palpitations, dizziness, or leg swelling.  GASTROINTESTINAL: No abdominal or epigastric pain. No nausea, vomiting, or hematemesis. No diarrhea or constipation. No melena or hematochezia.  GENITOURINARY: No dysuria, frequency, hematuria, or incontinence.  NEUROLOGICAL: No headaches, memory loss, loss of strength, numbness, or tremors.  SKIN: No itching, burning, rashes, or lesions.   MUSCULOSKELETAL: No joint pain or swelling. No muscle, back, or extremity pain.  PSYCHIATRIC: No depression, anxiety, mood swings, or difficulty sleeping.        PAST MEDICAL & SURGICAL HISTORY-  Hypothyroid      Colitis      History of left hip replacement          Medications:  zolpidem 5 milliGRAM(s) Oral at bedtime PRN    aspirin  chewable 81 milliGRAM(s) Oral daily  heparin   Injectable 5000 Unit(s) SubCutaneous every 8 hours  ticagrelor 90 milliGRAM(s) Oral every 12 hours    mesalamine DR Capsule 1200 milliGRAM(s) Oral three times a day  mesalamine Suppository 1000 milliGRAM(s) Rectal at bedtime    atorvastatin 80 milliGRAM(s) Oral at bedtime  cholestyramine Powder (Sugar-Free) 4 Gram(s) Oral daily  levothyroxine 75 MICROGram(s) Oral daily    chlorhexidine 2% Cloths 1 Application(s) Topical <User Schedule>        ICU Vital Signs Last 24 Hrs  T(C): 37 (28 Oct 2023 23:54), Max: 37 (28 Oct 2023 23:54)  T(F): 98.6 (28 Oct 2023 23:54), Max: 98.6 (28 Oct 2023 23:54)  HR: 62 (29 Oct 2023 01:00) (62 - 117)  BP: 152/78 (29 Oct 2023 01:00) (140/73 - 170/89)  BP(mean): 107 (29 Oct 2023 01:00) (92 - 125)  ABP: --  ABP(mean): --  RR: 16 (29 Oct 2023 01:00) (16 - 39)  SpO2: 96% (29 Oct 2023 01:00) (94% - 98%)    O2 Parameters below as of 28 Oct 2023 19:00  Patient On (Oxygen Delivery Method): room air      I&O's Detail    28 Oct 2023 07:01  -  29 Oct 2023 01:53  --------------------------------------------------------  IN:  Total IN: 0 mL    OUT:    Voided (mL): 500 mL  Total OUT: 500 mL    Total NET: -500 mL      LABS:                        15.4   11.50 )-----------( 259      ( 28 Oct 2023 15:30 )             48.0     10-28    140  |  104  |  19  ----------------------------<  135<H>  4.4   |  28  |  1.10    Ca    9.7      28 Oct 2023 15:30  Mg     1.7     10-28    TPro  7.7  /  Alb  4.1  /  TBili  0.5  /  DBili  x   /  AST  18  /  ALT  28  /  AlkPhos  59  10-28      CAPILLARY BLOOD GLUCOSE      PT/INR - ( 28 Oct 2023 15:30 )   PT: 11.9 sec;   INR: 1.02 ratio    PTT - ( 28 Oct 2023 15:30 )  PTT:33.4 sec      Urinalysis Basic - ( 28 Oct 2023 15:30 )  Color: x / Appearance: x / SG: x / pH: x  Gluc: 135 mg/dL / Ketone: x  / Bili: x / Urobili: x   Blood: x / Protein: x / Nitrite: x   Leuk Esterase: x / RBC: x / WBC x   Sq Epi: x / Non Sq Epi: x / Bacteria: x      CULTURES:      Physical Examination:  General: Middle aged male, resting comfortably in bed. In no acute distress.    HEENT: Pupils equal, reactive to light. Symmetric.  PULM: Clear to auscultation bilaterally, no adventitious sounds. No significant sputum production.  NECK: Supple, no lymphadenopathy, trachea midline.  CVS: Regular rate and rhythm. No murmurs, rubs, or gallops. S1, S2 intact.   ABD: Soft, nondistended, nontender, normoactive bowel sounds. No masses palpable   EXT: No edema, nontender.  SKIN: Warm and well perfused, no rashes noted.  NEURO: Alert, oriented, interactive, nonfocal.  DEVICES:       RADIOLOGY-    < from: Xray Chest 1 View- PORTABLE-Urgent (10.28.23 @ 15:42) >  ACC: [id number] EXAM:  XR CHEST PORTABLE URGENT 1V   ORDERED BY: BARBARA JOHANSEN     PROCEDURE DATE:  10/28/2023      INTERPRETATION:  AP chest on October 28, 2023 at 3:33 PM. Patient has   chest pain.    COMPARISON: None available.    Heart size is within normal limits.    Lungs are clear.    IMPRESSION: Negative chest.    --- End of Report ---    CARL MIXON MD; Attending Radiologist  This document has been electronically signed. Oct 28 2023  3:44PM    < end of copied text >      Time spent on this patient encounter, which includes documenting this note in the electronic medical record, was 75+ minutes including assessing the presenting problems with associated risks, reviewing the medical record to prepare for the encounter, and meeting face to face with the patient to obtain additional history.  I have also performed an appropriate physical exam, made interventions listed and ordered and interpreted appropriate diagnostic studies as documented.     To improve communication and patient safety, I have coordinated care with the multidisciplinary team including the bedside nurse, appropriate attending of record and consultants as needed.

## 2023-10-29 NOTE — PROGRESS NOTE ADULT - SUBJECTIVE AND OBJECTIVE BOX
Interval Events: Patient seen and examined at bedside. No acute overnight events. Patient states that he feels "great" and reports mild pain in right arm at access site as well as mild superficial sternal pain. Denies fevers, chills, headache, lighteadedness/dizziness, severe chest pain, palpitations, sob, abdominal pain, n/v.      Review of Systems:  Constitutional: No fever, chills, fatigue  Neuro: No headache, numbness, weakness  Resp: No cough, wheezing, shortness of breath  CVS: No palpitations, leg swelling +mild pain at "breast bone"  GI: No abdominal pain, nausea, vomiting, diarrhea   : No dysuria, frequency, incontinence  Skin: No itching, burning, rashes, or lesions   Msk: No joint pain or swelling +mild right arm pain  Psych: No depression, anxiety, mood swings    ICU Vital Signs Last 24 Hrs  T(C): 36.6 (29 Oct 2023 07:55), Max: 37 (28 Oct 2023 23:54)  T(F): 97.8 (29 Oct 2023 07:55), Max: 98.6 (28 Oct 2023 23:54)  HR: 69 (29 Oct 2023 11:00) (62 - 117)  BP: 160/83 (29 Oct 2023 11:00) (127/68 - 170/89)  BP(mean): 113 (29 Oct 2023 11:00) (88 - 125)  ABP: --  ABP(mean): --  RR: 33 (29 Oct 2023 11:00) (15 - 39)  SpO2: 95% (29 Oct 2023 11:00) (94% - 98%)    O2 Parameters below as of 29 Oct 2023 12:00  Patient On (Oxygen Delivery Method): room air              10-28-23 @ 07:01  -  10-29-23 @ 07:00  --------------------------------------------------------  IN: 0 mL / OUT: 1300 mL / NET: -1300 mL    10-29-23 @ 07:01  -  10-29-23 @ 11:56  --------------------------------------------------------  IN: 62.5 mL / OUT: 0 mL / NET: 62.5 mL        CAPILLARY BLOOD GLUCOSE          I&O's Summary    28 Oct 2023 07:01  -  29 Oct 2023 07:00  --------------------------------------------------------  IN: 0 mL / OUT: 1300 mL / NET: -1300 mL    29 Oct 2023 07:01  -  29 Oct 2023 11:56  --------------------------------------------------------  IN: 62.5 mL / OUT: 0 mL / NET: 62.5 mL        Physical Exam:   Gen: NAD  Neuro: A&Ox3, nonfocal  HEENT: Moist mucous membranes, EOMI, PERRL, conjunctiva and sclera clear  Resp: CTAB  CVS: RRR, S1S2, no m/r/g  Abd: Soft, Nontender, Nondistended; Bowel sounds present  Ext: WWP, no clubbing or cyanosis   Skin: warm, dry    Meds:    metoprolol tartrate Oral    atorvastatin Oral      acetaminophen     Tablet .. Oral  zolpidem Oral PRN      aspirin enteric coated Oral  enoxaparin Injectable SubCutaneous  ticagrelor Oral            chlorhexidine 2% Cloths Topical                              15.2   11.83 )-----------( 220      ( 29 Oct 2023 08:23 )             46.5       10-29    135  |  102  |  16  ----------------------------<  142<H>  3.9   |  27  |  0.85    Ca    9.1      29 Oct 2023 08:23  Phos  2.1     10-29  Mg     2.0     10-29    TPro  7.5  /  Alb  3.8  /  TBili  0.8  /  DBili  x   /  AST  64<H>  /  ALT  31  /  AlkPhos  65  10-29          PT/INR - ( 28 Oct 2023 15:30 )   PT: 11.9 sec;   INR: 1.02 ratio         PTT - ( 28 Oct 2023 15:30 )  PTT:33.4 sec  Urinalysis Basic - ( 29 Oct 2023 08:23 )    Color: x / Appearance: x / SG: x / pH: x  Gluc: 142 mg/dL / Ketone: x  / Bili: x / Urobili: x   Blood: x / Protein: x / Nitrite: x   Leuk Esterase: x / RBC: x / WBC x   Sq Epi: x / Non Sq Epi: x / Bacteria: x                  Radiology:    Bedside Ultrasound:    Tubes/Lines: None      GLOBAL ISSUE/BEST PRACTICE:  Analgesia:  Sedation:  HOB elevation: Y  Stress ulcer prophylaxis:  VTE prophylaxis:  Glycemic control:  Nutrition:    CODE STATUS:        Interval Events: Patient seen and examined at bedside. No acute overnight events. Patient states that he feels "great" and reports mild pain in right arm at access site as well as mild superficial sternal pain. Denies fevers, chills, headache, lighteadedness/dizziness, severe chest pain, palpitations, sob, abdominal pain, n/v.      Review of Systems:  Constitutional: No fever, chills, fatigue  Neuro: No headache, numbness, weakness  Resp: No cough, wheezing, shortness of breath  CVS: No palpitations, leg swelling +mild pain at "breast bone"  GI: No abdominal pain, nausea, vomiting, diarrhea   : No dysuria, frequency, incontinence  Skin: No itching, burning, rashes, or lesions   Msk: No joint pain or swelling +mild right arm pain  Psych: No depression, anxiety, mood swings    ICU Vital Signs Last 24 Hrs  T(C): 36.6 (29 Oct 2023 07:55), Max: 37 (28 Oct 2023 23:54)  T(F): 97.8 (29 Oct 2023 07:55), Max: 98.6 (28 Oct 2023 23:54)  HR: 69 (29 Oct 2023 11:00) (62 - 117)  BP: 160/83 (29 Oct 2023 11:00) (127/68 - 170/89)  BP(mean): 113 (29 Oct 2023 11:00) (88 - 125)  ABP: --  ABP(mean): --  RR: 33 (29 Oct 2023 11:00) (15 - 39)  SpO2: 95% (29 Oct 2023 11:00) (94% - 98%)    O2 Parameters below as of 29 Oct 2023 12:00  Patient On (Oxygen Delivery Method): room air              10-28-23 @ 07:01  -  10-29-23 @ 07:00  --------------------------------------------------------  IN: 0 mL / OUT: 1300 mL / NET: -1300 mL    10-29-23 @ 07:01  -  10-29-23 @ 11:56  --------------------------------------------------------  IN: 62.5 mL / OUT: 0 mL / NET: 62.5 mL        CAPILLARY BLOOD GLUCOSE          I&O's Summary    28 Oct 2023 07:01  -  29 Oct 2023 07:00  --------------------------------------------------------  IN: 0 mL / OUT: 1300 mL / NET: -1300 mL    29 Oct 2023 07:01  -  29 Oct 2023 11:56  --------------------------------------------------------  IN: 62.5 mL / OUT: 0 mL / NET: 62.5 mL        Physical Exam:   Gen: NAD  Neuro: A&Ox3, nonfocal  HEENT: Moist mucous membranes, EOMI, PERRL, conjunctiva and sclera clear  Resp: CTAB  CVS: RRR, S1S2, no m/r/g  Abd: Soft, Nontender, Nondistended; Bowel sounds present  Ext: WWP, no clubbing or cyanosis   Skin: warm, dry    Meds:    metoprolol tartrate Oral    atorvastatin Oral      acetaminophen     Tablet .. Oral  zolpidem Oral PRN      aspirin enteric coated Oral  enoxaparin Injectable SubCutaneous  ticagrelor Oral            chlorhexidine 2% Cloths Topical                              15.2   11.83 )-----------( 220      ( 29 Oct 2023 08:23 )             46.5       10-29    135  |  102  |  16  ----------------------------<  142<H>  3.9   |  27  |  0.85    Ca    9.1      29 Oct 2023 08:23  Phos  2.1     10-29  Mg     2.0     10-29    TPro  7.5  /  Alb  3.8  /  TBili  0.8  /  DBili  x   /  AST  64<H>  /  ALT  31  /  AlkPhos  65  10-29          PT/INR - ( 28 Oct 2023 15:30 )   PT: 11.9 sec;   INR: 1.02 ratio         PTT - ( 28 Oct 2023 15:30 )  PTT:33.4 sec  Urinalysis Basic - ( 29 Oct 2023 08:23 )    Color: x / Appearance: x / SG: x / pH: x  Gluc: 142 mg/dL / Ketone: x  / Bili: x / Urobili: x   Blood: x / Protein: x / Nitrite: x   Leuk Esterase: x / RBC: x / WBC x   Sq Epi: x / Non Sq Epi: x / Bacteria: x                  Radiology:  < from: TTE W or WO Ultrasound Enhancing Agent (10.29.23 @ 08:40) >  CONCLUSIONS:      1. Technically difficult image quality.   2. Left ventricular systolic function is mildly decreased with an ejection fraction visually estimated at 40 to 45 %. Regional wall motion abnormalities present.   3. Apical septal segment, apex, and mid inferoseptal segment are abnormal.   4. There is mild (grade 1) left ventricular diastolic dysfunction.   5. Normal right ventricular cavity size, wall thickness, and systolic function.   6. Trileaflet aortic valve with normal systolic excursion. calcification of the aortic valve leaflets.   7. Trace mitral regurgitation.   8. Trace tricuspid regurgitation.   9. Trace pulmonic regurgitation.  10. The inferior vena cava is normal in size measuring 1.75 cm in diameter, (normal <2.1cm) with abnormal inspiratory collapse (abnormal <50%) consistent with mildly elevated right atrial pressure (~8, range 5-10mmHg).    < end of copied text >      Bedside Ultrasound:    Tubes/Lines: None      GLOBAL ISSUE/BEST PRACTICE:  Analgesia:  Sedation:  HOB elevation: Y  Stress ulcer prophylaxis:  VTE prophylaxis:  Glycemic control:  Nutrition:    CODE STATUS:        Interval Events: Patient seen and examined at bedside. No acute overnight events. Patient states that he feels "great" and reports mild pain in right arm at access site as well as mild superficial sternal pain. Denies fevers, chills, headache, lighteadedness/dizziness, severe chest pain, palpitations, sob, abdominal pain, n/v.        ICU Vital Signs Last 24 Hrs  T(C): 36.6 (29 Oct 2023 07:55), Max: 37 (28 Oct 2023 23:54)  T(F): 97.8 (29 Oct 2023 07:55), Max: 98.6 (28 Oct 2023 23:54)  HR: 69 (29 Oct 2023 11:00) (62 - 117)  BP: 160/83 (29 Oct 2023 11:00) (127/68 - 170/89)  BP(mean): 113 (29 Oct 2023 11:00) (88 - 125)  ABP: --  ABP(mean): --  RR: 33 (29 Oct 2023 11:00) (15 - 39)  SpO2: 95% (29 Oct 2023 11:00) (94% - 98%)    O2 Parameters below as of 29 Oct 2023 12:00  Patient On (Oxygen Delivery Method): room air              10-28-23 @ 07:01  -  10-29-23 @ 07:00  --------------------------------------------------------  IN: 0 mL / OUT: 1300 mL / NET: -1300 mL    10-29-23 @ 07:01  -  10-29-23 @ 11:56  --------------------------------------------------------  IN: 62.5 mL / OUT: 0 mL / NET: 62.5 mL        CAPILLARY BLOOD GLUCOSE          I&O's Summary    28 Oct 2023 07:01  -  29 Oct 2023 07:00  --------------------------------------------------------  IN: 0 mL / OUT: 1300 mL / NET: -1300 mL    29 Oct 2023 07:01  -  29 Oct 2023 11:56  --------------------------------------------------------  IN: 62.5 mL / OUT: 0 mL / NET: 62.5 mL        Physical Exam:   Gen: NAD  Neuro: A&Ox3, nonfocal  HEENT: Moist mucous membranes, EOMI, PERRL, conjunctiva and sclera clear  Resp: CTAB  CVS: RRR, S1S2, no m/r/g  Abd: Soft, Nontender, Nondistended; Bowel sounds present  Ext: WWP, no clubbing or cyanosis   Skin: warm, dry    Meds:    metoprolol tartrate Oral    atorvastatin Oral      acetaminophen     Tablet .. Oral  zolpidem Oral PRN      aspirin enteric coated Oral  enoxaparin Injectable SubCutaneous  ticagrelor Oral            chlorhexidine 2% Cloths Topical                              15.2   11.83 )-----------( 220      ( 29 Oct 2023 08:23 )             46.5       10-29    135  |  102  |  16  ----------------------------<  142<H>  3.9   |  27  |  0.85    Ca    9.1      29 Oct 2023 08:23  Phos  2.1     10-29  Mg     2.0     10-29    TPro  7.5  /  Alb  3.8  /  TBili  0.8  /  DBili  x   /  AST  64<H>  /  ALT  31  /  AlkPhos  65  10-29          PT/INR - ( 28 Oct 2023 15:30 )   PT: 11.9 sec;   INR: 1.02 ratio         PTT - ( 28 Oct 2023 15:30 )  PTT:33.4 sec  Urinalysis Basic - ( 29 Oct 2023 08:23 )    Color: x / Appearance: x / SG: x / pH: x  Gluc: 142 mg/dL / Ketone: x  / Bili: x / Urobili: x   Blood: x / Protein: x / Nitrite: x   Leuk Esterase: x / RBC: x / WBC x   Sq Epi: x / Non Sq Epi: x / Bacteria: x                  Radiology:  < from: TTE W or WO Ultrasound Enhancing Agent (10.29.23 @ 08:40) >  CONCLUSIONS:      1. Technically difficult image quality.   2. Left ventricular systolic function is mildly decreased with an ejection fraction visually estimated at 40 to 45 %. Regional wall motion abnormalities present.   3. Apical septal segment, apex, and mid inferoseptal segment are abnormal.   4. There is mild (grade 1) left ventricular diastolic dysfunction.   5. Normal right ventricular cavity size, wall thickness, and systolic function.   6. Trileaflet aortic valve with normal systolic excursion. calcification of the aortic valve leaflets.   7. Trace mitral regurgitation.   8. Trace tricuspid regurgitation.   9. Trace pulmonic regurgitation.  10. The inferior vena cava is normal in size measuring 1.75 cm in diameter, (normal <2.1cm) with abnormal inspiratory collapse (abnormal <50%) consistent with mildly elevated right atrial pressure (~8, range 5-10mmHg).    < end of copied text >        Tubes/Lines: None      GLOBAL ISSUE/BEST PRACTICE:  Analgesia: Y  Sedation: NA  HOB elevation: Y  Stress ulcer prophylaxis: NA  VTE prophylaxis: Y  Glycemic control: Y  Nutrition: Y    CODE STATUS: FULL

## 2023-10-29 NOTE — PROGRESS NOTE ADULT - ASSESSMENT
76 y/o Male with HTN, HLD, Hypothyroidism, Ulcerative Colitis presents to  ED complaining of with acute onset substernal chest pain with:      1. Anterior STEMI         Neuro: Alert and oriented, mentating at baseline. Avoid delirogenic medications. Pain control PRN.   Resp: Maintaining O2>93% on NC. Actively titrating FiO2 as tolerated. Aspiration precautions.  CV: EKG on admission with inferior ST elevation with subtle ST depressions in V1 and V2. Taken emergently to Cath Lab, s/p DWAINE x3 to LAD. Radial band removed, no hematoma. Pulses and sensation intact. DAPT w/ ASA and Brilinta to promote stent patentcy. Plan for staged PCI to LCx on Monday. TTE ordered, results pending. Estimated EF 40% on LV gram. Initiated on Statin. Lipid panel in AM. Monitor clinical and laboratory end points of perfusion, maintain MAP >65.   GI: Dash diet, tolerating well. GI prophylaxis with Protonix.  Renal: Stable renal indicies. s/p gentle hydration for renal protection from contraction load. Voiding independently. Adequate urine output. Trend labs, replete lytes as needed to maintain K>4, Mg>3, Phos >2.  Endo: Thyroid panel in AM. Maintain euthyroid. C/W Synthroid.  ID: Leukocytosis, afebrile. No overt s/s of underlying infectious etiology. Likely reactive. Monitor off abx. Trend WBC and fevers.   Heme: H/H stable. Trend H/H, transfuse for hgb <7.0 if necessary. No need for Full AC per Interventionalist. DVT prophylaxis with Heparin. SCD in place.     76 y/o Male with HTN, HLD, Hypothyroidism, Ulcerative Colitis presents to  ED complaining of with acute onset substernal chest pain with:      1. Anterior STEMI         Neuro:  - Alert and oriented, mentating at baseline.   - tylenol for pain control PRN.     Resp:   - Maintaining O2>93% on RA    CV:   - EKG on admission with inferior ST elevation with subtle ST depressions in V1 and V2.   - s/p DWAINE x3 to LAD. Radial band removed, no hematoma. Pulses and sensation intact.   - DAPT w/ ASA and Brilinta to promote stent patentcy.   - Plan for staged PCI to LCx on Monday.   - Estimated EF 40% on LV gram.  - 10/29 TTE: ejection fraction visually estimated at 40 to 45 %. Regional wall motion abnormalities present.   - Initiated on Statin  - Lipid panel: LDL 96 (goal <70)  - maintain MAP >65.   - brief episode of VT on tele 10/29 in AM. Patient asymptomatic. Increased metoprolol from 25mg BID to 50mg BID    GI:   - Dash diet, tolerating well.   - GI prophylaxis with Protonix.    Renal:   - Stable renal indicies.   - s/p gentle hydration for renal protection from contraction load.   - Voiding independently. Adequate urine output.   - Trend labs, replete lytes as needed to maintain K>4, Mg>3, Phos >2.    Endo:   - Thyroid panel in AM.   - Maintain euthyroid.   - C/W Synthroid (VERIFY HOME DOSE)    ID:   - Leukocytosis, afebrile. No overt s/s of underlying infectious etiology. Likely reactive.   - Monitor off abx. Trend WBC and fevers.     Heme:   - H/H stable.   - Trend H/H, transfuse for hgb <8.0 if necessary.   - No need for Full AC per Interventionalist.   - DVT prophylaxis with Heparin. SCD in place.     78 y/o Male with HTN, HLD, Hypothyroidism, Ulcerative Colitis presents to  ED complaining of with acute onset substernal chest pain with:      1. Anterior STEMI         Neuro:  - Alert and oriented, mentating at baseline.   - tylenol for pain control PRN.     Resp:   - Maintaining O2>93% on RA    CV:   - EKG on admission with inferior ST elevation with subtle ST depressions in V1 and V2.   - s/p DWAINE x3 to LAD. Radial band removed, no hematoma. Pulses and sensation intact.   - DAPT w/ ASA and Brilinta to promote stent patentcy.   - Plan for staged PCI to LCx on Monday.   - Estimated EF 40% on LV gram.  - 10/29 TTE: ejection fraction visually estimated at 40 to 45 %. Regional wall motion abnormalities present.   - Initiated on Statin  - Lipid panel: LDL 96 (goal <70)  - maintain MAP >65.   - brief episode of VT on tele 10/29 in AM. Patient asymptomatic. Increased metoprolol from 25mg BID to 50mg BID    GI:   - Dash diet, tolerating well.   - GI prophylaxis with Protonix.    Renal:   - Stable renal indicies.   - s/p gentle hydration for renal protection from contraction load.   - Voiding independently. Adequate urine output.   - Trend labs, replete lytes as needed to maintain K>4, Mg>3, Phos >2.    Endo:   - Thyroid panel in AM.   - Maintain euthyroid.   - C/W Synthroid 88mcg    ID:   - Leukocytosis, afebrile. No overt s/s of underlying infectious etiology. Likely reactive.   - Monitor off abx. Trend WBC and fevers.     Heme:   - H/H stable.   - Trend H/H, transfuse for hgb <8.0 if necessary.   - No need for Full AC per Interventionalist.   - DVT prophylaxis with Heparin. SCD in place.

## 2023-10-29 NOTE — DIETITIAN INITIAL EVALUATION ADULT - PERTINENT MEDS FT
MEDICATIONS  (STANDING):  acetaminophen     Tablet .. 650 milliGRAM(s) Oral every 6 hours  aspirin enteric coated 81 milliGRAM(s) Oral daily  atorvastatin 80 milliGRAM(s) Oral at bedtime  chlorhexidine 2% Cloths 1 Application(s) Topical <User Schedule>  enoxaparin Injectable 40 milliGRAM(s) SubCutaneous every 24 hours  metoprolol tartrate 25 milliGRAM(s) Oral once  metoprolol tartrate 50 milliGRAM(s) Oral two times a day  potassium phosphate IVPB 15 milliMole(s) IV Intermittent once  ticagrelor 90 milliGRAM(s) Oral every 12 hours    MEDICATIONS  (PRN):  zolpidem 5 milliGRAM(s) Oral at bedtime PRN Insomnia

## 2023-10-30 ENCOUNTER — TRANSCRIPTION ENCOUNTER (OUTPATIENT)
Age: 78
End: 2023-10-30

## 2023-10-30 LAB
ANION GAP SERPL CALC-SCNC: 6 MMOL/L — SIGNIFICANT CHANGE UP (ref 5–17)
ANION GAP SERPL CALC-SCNC: 6 MMOL/L — SIGNIFICANT CHANGE UP (ref 5–17)
BUN SERPL-MCNC: 15 MG/DL — SIGNIFICANT CHANGE UP (ref 7–23)
BUN SERPL-MCNC: 15 MG/DL — SIGNIFICANT CHANGE UP (ref 7–23)
CALCIUM SERPL-MCNC: 9 MG/DL — SIGNIFICANT CHANGE UP (ref 8.5–10.1)
CALCIUM SERPL-MCNC: 9 MG/DL — SIGNIFICANT CHANGE UP (ref 8.5–10.1)
CHLORIDE SERPL-SCNC: 103 MMOL/L — SIGNIFICANT CHANGE UP (ref 96–108)
CHLORIDE SERPL-SCNC: 103 MMOL/L — SIGNIFICANT CHANGE UP (ref 96–108)
CO2 SERPL-SCNC: 28 MMOL/L — SIGNIFICANT CHANGE UP (ref 22–31)
CO2 SERPL-SCNC: 28 MMOL/L — SIGNIFICANT CHANGE UP (ref 22–31)
CREAT SERPL-MCNC: 0.87 MG/DL — SIGNIFICANT CHANGE UP (ref 0.5–1.3)
CREAT SERPL-MCNC: 0.87 MG/DL — SIGNIFICANT CHANGE UP (ref 0.5–1.3)
EGFR: 90 ML/MIN/1.73M2 — SIGNIFICANT CHANGE UP
EGFR: 90 ML/MIN/1.73M2 — SIGNIFICANT CHANGE UP
GLUCOSE SERPL-MCNC: 113 MG/DL — HIGH (ref 70–99)
GLUCOSE SERPL-MCNC: 113 MG/DL — HIGH (ref 70–99)
HCT VFR BLD CALC: 47.1 % — SIGNIFICANT CHANGE UP (ref 39–50)
HCT VFR BLD CALC: 47.1 % — SIGNIFICANT CHANGE UP (ref 39–50)
HGB BLD-MCNC: 15.3 G/DL — SIGNIFICANT CHANGE UP (ref 13–17)
HGB BLD-MCNC: 15.3 G/DL — SIGNIFICANT CHANGE UP (ref 13–17)
MAGNESIUM SERPL-MCNC: 2 MG/DL — SIGNIFICANT CHANGE UP (ref 1.6–2.6)
MAGNESIUM SERPL-MCNC: 2 MG/DL — SIGNIFICANT CHANGE UP (ref 1.6–2.6)
MCHC RBC-ENTMCNC: 27.9 PG — SIGNIFICANT CHANGE UP (ref 27–34)
MCHC RBC-ENTMCNC: 27.9 PG — SIGNIFICANT CHANGE UP (ref 27–34)
MCHC RBC-ENTMCNC: 32.5 GM/DL — SIGNIFICANT CHANGE UP (ref 32–36)
MCHC RBC-ENTMCNC: 32.5 GM/DL — SIGNIFICANT CHANGE UP (ref 32–36)
MCV RBC AUTO: 85.8 FL — SIGNIFICANT CHANGE UP (ref 80–100)
MCV RBC AUTO: 85.8 FL — SIGNIFICANT CHANGE UP (ref 80–100)
NRBC # BLD: 0 /100 WBCS — SIGNIFICANT CHANGE UP (ref 0–0)
NRBC # BLD: 0 /100 WBCS — SIGNIFICANT CHANGE UP (ref 0–0)
PHOSPHATE SERPL-MCNC: 2.6 MG/DL — SIGNIFICANT CHANGE UP (ref 2.5–4.5)
PHOSPHATE SERPL-MCNC: 2.6 MG/DL — SIGNIFICANT CHANGE UP (ref 2.5–4.5)
PLATELET # BLD AUTO: 232 K/UL — SIGNIFICANT CHANGE UP (ref 150–400)
PLATELET # BLD AUTO: 232 K/UL — SIGNIFICANT CHANGE UP (ref 150–400)
POTASSIUM SERPL-MCNC: 3.8 MMOL/L — SIGNIFICANT CHANGE UP (ref 3.5–5.3)
POTASSIUM SERPL-MCNC: 3.8 MMOL/L — SIGNIFICANT CHANGE UP (ref 3.5–5.3)
POTASSIUM SERPL-SCNC: 3.8 MMOL/L — SIGNIFICANT CHANGE UP (ref 3.5–5.3)
POTASSIUM SERPL-SCNC: 3.8 MMOL/L — SIGNIFICANT CHANGE UP (ref 3.5–5.3)
RBC # BLD: 5.49 M/UL — SIGNIFICANT CHANGE UP (ref 4.2–5.8)
RBC # BLD: 5.49 M/UL — SIGNIFICANT CHANGE UP (ref 4.2–5.8)
RBC # FLD: 13.2 % — SIGNIFICANT CHANGE UP (ref 10.3–14.5)
RBC # FLD: 13.2 % — SIGNIFICANT CHANGE UP (ref 10.3–14.5)
SODIUM SERPL-SCNC: 137 MMOL/L — SIGNIFICANT CHANGE UP (ref 135–145)
SODIUM SERPL-SCNC: 137 MMOL/L — SIGNIFICANT CHANGE UP (ref 135–145)
WBC # BLD: 10.63 K/UL — HIGH (ref 3.8–10.5)
WBC # BLD: 10.63 K/UL — HIGH (ref 3.8–10.5)
WBC # FLD AUTO: 10.63 K/UL — HIGH (ref 3.8–10.5)
WBC # FLD AUTO: 10.63 K/UL — HIGH (ref 3.8–10.5)

## 2023-10-30 PROCEDURE — 99233 SBSQ HOSP IP/OBS HIGH 50: CPT

## 2023-10-30 PROCEDURE — 92978 ENDOLUMINL IVUS OCT C 1ST: CPT | Mod: 26,LC

## 2023-10-30 PROCEDURE — 99152 MOD SED SAME PHYS/QHP 5/>YRS: CPT

## 2023-10-30 PROCEDURE — 92928 PRQ TCAT PLMT NTRAC ST 1 LES: CPT | Mod: LC

## 2023-10-30 PROCEDURE — 93010 ELECTROCARDIOGRAM REPORT: CPT

## 2023-10-30 PROCEDURE — 99233 SBSQ HOSP IP/OBS HIGH 50: CPT | Mod: GC

## 2023-10-30 RX ORDER — POTASSIUM CHLORIDE 20 MEQ
40 PACKET (EA) ORAL ONCE
Refills: 0 | Status: COMPLETED | OUTPATIENT
Start: 2023-10-30 | End: 2023-10-30

## 2023-10-30 RX ORDER — TICAGRELOR 90 MG/1
1 TABLET ORAL
Qty: 60 | Refills: 0
Start: 2023-10-30 | End: 2023-11-28

## 2023-10-30 RX ORDER — ASPIRIN/CALCIUM CARB/MAGNESIUM 324 MG
81 TABLET ORAL ONCE
Refills: 0 | Status: COMPLETED | OUTPATIENT
Start: 2023-10-30 | End: 2023-10-30

## 2023-10-30 RX ORDER — SACUBITRIL AND VALSARTAN 24; 26 MG/1; MG/1
1 TABLET, FILM COATED ORAL
Qty: 60 | Refills: 0
Start: 2023-10-30 | End: 2023-11-28

## 2023-10-30 RX ORDER — POTASSIUM CHLORIDE 20 MEQ
40 PACKET (EA) ORAL EVERY 4 HOURS
Refills: 0 | Status: DISCONTINUED | OUTPATIENT
Start: 2023-10-30 | End: 2023-10-30

## 2023-10-30 RX ORDER — ASPIRIN/CALCIUM CARB/MAGNESIUM 324 MG
1 TABLET ORAL
Qty: 30 | Refills: 0
Start: 2023-10-30 | End: 2023-11-28

## 2023-10-30 RX ORDER — METOPROLOL TARTRATE 50 MG
100 TABLET ORAL DAILY
Refills: 0 | Status: DISCONTINUED | OUTPATIENT
Start: 2023-10-30 | End: 2023-10-31

## 2023-10-30 RX ORDER — TICAGRELOR 90 MG/1
90 TABLET ORAL ONCE
Refills: 0 | Status: COMPLETED | OUTPATIENT
Start: 2023-10-30 | End: 2023-10-30

## 2023-10-30 RX ORDER — METOPROLOL TARTRATE 50 MG
1 TABLET ORAL
Qty: 30 | Refills: 0
Start: 2023-10-30 | End: 2023-11-28

## 2023-10-30 RX ORDER — ATORVASTATIN CALCIUM 80 MG/1
1 TABLET, FILM COATED ORAL
Qty: 30 | Refills: 0
Start: 2023-10-30 | End: 2023-11-28

## 2023-10-30 RX ORDER — TICAGRELOR 90 MG/1
90 TABLET ORAL EVERY 12 HOURS
Refills: 0 | Status: DISCONTINUED | OUTPATIENT
Start: 2023-10-30 | End: 2023-10-31

## 2023-10-30 RX ORDER — HEPARIN SODIUM 5000 [USP'U]/ML
5000 INJECTION INTRAVENOUS; SUBCUTANEOUS EVERY 8 HOURS
Refills: 0 | Status: DISCONTINUED | OUTPATIENT
Start: 2023-10-30 | End: 2023-10-31

## 2023-10-30 RX ORDER — POTASSIUM PHOSPHATE, MONOBASIC POTASSIUM PHOSPHATE, DIBASIC 236; 224 MG/ML; MG/ML
15 INJECTION, SOLUTION INTRAVENOUS ONCE
Refills: 0 | Status: COMPLETED | OUTPATIENT
Start: 2023-10-30 | End: 2023-10-30

## 2023-10-30 RX ORDER — ACETAMINOPHEN 500 MG
650 TABLET ORAL EVERY 6 HOURS
Refills: 0 | Status: DISCONTINUED | OUTPATIENT
Start: 2023-10-30 | End: 2023-10-31

## 2023-10-30 RX ORDER — ASPIRIN/CALCIUM CARB/MAGNESIUM 324 MG
81 TABLET ORAL
Refills: 0 | Status: DISCONTINUED | OUTPATIENT
Start: 2023-10-31 | End: 2023-10-31

## 2023-10-30 RX ORDER — SACUBITRIL AND VALSARTAN 24; 26 MG/1; MG/1
1 TABLET, FILM COATED ORAL
Refills: 0 | Status: DISCONTINUED | OUTPATIENT
Start: 2023-10-30 | End: 2023-10-31

## 2023-10-30 RX ADMIN — TICAGRELOR 90 MILLIGRAM(S): 90 TABLET ORAL at 07:30

## 2023-10-30 RX ADMIN — Medication 88 MICROGRAM(S): at 05:11

## 2023-10-30 RX ADMIN — POTASSIUM PHOSPHATE, MONOBASIC POTASSIUM PHOSPHATE, DIBASIC 62.5 MILLIMOLE(S): 236; 224 INJECTION, SOLUTION INTRAVENOUS at 08:00

## 2023-10-30 RX ADMIN — Medication 81 MILLIGRAM(S): at 07:31

## 2023-10-30 RX ADMIN — Medication 650 MILLIGRAM(S): at 10:34

## 2023-10-30 RX ADMIN — ATORVASTATIN CALCIUM 80 MILLIGRAM(S): 80 TABLET, FILM COATED ORAL at 21:37

## 2023-10-30 RX ADMIN — Medication 100 MILLIGRAM(S): at 11:21

## 2023-10-30 RX ADMIN — Medication 50 MILLIGRAM(S): at 05:11

## 2023-10-30 RX ADMIN — HEPARIN SODIUM 5000 UNIT(S): 5000 INJECTION INTRAVENOUS; SUBCUTANEOUS at 21:38

## 2023-10-30 RX ADMIN — SACUBITRIL AND VALSARTAN 1 TABLET(S): 24; 26 TABLET, FILM COATED ORAL at 17:02

## 2023-10-30 RX ADMIN — Medication 40 MILLIEQUIVALENT(S): at 10:34

## 2023-10-30 RX ADMIN — Medication 650 MILLIGRAM(S): at 11:30

## 2023-10-30 RX ADMIN — TICAGRELOR 90 MILLIGRAM(S): 90 TABLET ORAL at 17:02

## 2023-10-30 RX ADMIN — HEPARIN SODIUM 5000 UNIT(S): 5000 INJECTION INTRAVENOUS; SUBCUTANEOUS at 15:22

## 2023-10-30 NOTE — CONSULT NOTE ADULT - ASSESSMENT
Physical Exam:   Vital Signs Last 24 Hrs  T(C): 36.7 (31 Oct 2023 05:04), Max: 36.7 (30 Oct 2023 08:04)  T(F): 98 (31 Oct 2023 05:04), Max: 98.1 (30 Oct 2023 08:04)  HR: 72 (31 Oct 2023 07:00) (65 - 128)  BP: 148/81 (31 Oct 2023 07:00) (102/64 - 178/99)  BP(mean): 105 (31 Oct 2023 07:00) (74 - 133)  RR: 30 (31 Oct 2023 07:00) (14 - 44)  SpO2: 95% (31 Oct 2023 07:00) (93% - 97%)    Parameters below as of 30 Oct 2023 09:40  Patient On (Oxygen Delivery Method): room air             CAPILLARY BLOOD GLUCOSE          Cholesterol, Serum: 113 mg/dL (05.19.21 @ 08:36)     HDL Cholesterol, Serum: 22 mg/dL (05.19.21 @ 08:36)     LDL Cholesterol Calculated: 66 mg/dL (05.19.21 @ 08:36)     DIET: CC  >50%

## 2023-10-30 NOTE — PROGRESS NOTE ADULT - SUBJECTIVE AND OBJECTIVE BOX
Patient seen and examined  s/p stage II of  PCI for StEMI  patient seen after cath  feels well  denies any complaints  remains in ICU level of care post cath    Review of Systems:  General:denies fever chills, headache, weakness  HEENT: denies blurry vision,diffculty swallowing, difficulty hearing, tinnitus  Cardiovascular: denies chest pain  ,palpitations  Pulmonary:denies shortness of breath, cough, wheezing, hemoptysis  Gastrointestinal: denies abdominal pain, constipation, diarrhea,nausea , vomiting, hematochezia  : denies hematuria, dysuria, or incontinence  Neurological: denies weakness, numbness , tingling, dizziness, tremors  MSK: denies muscle pain, difficulty ambulating, swelling, back pain  skin: denies skin rash, itching, burning, or  skin lesions  Psychiatrical: denies mood disturbances, anxierty, feeling depressed, depression , or difficulty sleeping    Objective:  Vitals  T(C): 36.7 (10-30-23 @ 08:04), Max: 36.7 (10-30-23 @ 04:00)  HR: 128 (10-30-23 @ 11:00) (64 - 128)  BP: 156/93 (10-30-23 @ 11:00) (118/70 - 166/90)  RR: 20 (10-30-23 @ 11:00) (12 - 32)  SpO2: 96% (10-30-23 @ 11:00) (92% - 97%)    Physical Exam:  General: comfortable, no acute distress  HEENT: Atraumatic, no LAD, trachea midline, PERRLA  Cardiovascular: normal s1s2, no murmurs, gallops or fricition rubs  Pulmonary: clear to ausculation Bilaterally, no wheezing , rhonchi  Gastrointestinal: soft non tender non distended, no masses felt, no organomegally  Muscloskeletal: no lower extremity edema, intact bilateral lower extremity pulses  Neurological: CN II-12 intact. No focal weakness  Psychiatrical: normal mood, cooperative  SKIN: no rash, lesions or ulcers    Labs:                          15.3   10.63 )-----------( 232      ( 30 Oct 2023 06:10 )             47.1     10-30    137  |  103  |  15  ----------------------------<  113<H>  3.8   |  28  |  0.87    Ca    9.0      30 Oct 2023 06:10  Phos  2.6     10-30  Mg     2.0     10-30    TPro  7.5  /  Alb  3.8  /  TBili  0.8  /  DBili  x   /  AST  64<H>  /  ALT  31  /  AlkPhos  65  10-29    LIVER FUNCTIONS - ( 29 Oct 2023 08:23 )  Alb: 3.8 g/dL / Pro: 7.5 g/dL / ALK PHOS: 65 U/L / ALT: 31 U/L / AST: 64 U/L / GGT: x           PT/INR - ( 28 Oct 2023 15:30 )   PT: 11.9 sec;   INR: 1.02 ratio         PTT - ( 28 Oct 2023 15:30 )  PTT:33.4 sec      Active Medications  MEDICATIONS  (STANDING):  atorvastatin 80 milliGRAM(s) Oral at bedtime  chlorhexidine 2% Cloths 1 Application(s) Topical <User Schedule>  heparin   Injectable 5000 Unit(s) SubCutaneous every 8 hours  levothyroxine 88 MICROGram(s) Oral daily  metoprolol succinate  milliGRAM(s) Oral daily  sacubitril 24 mG/valsartan 26 mG 1 Tablet(s) Oral two times a day  ticagrelor 90 milliGRAM(s) Oral every 12 hours    MEDICATIONS  (PRN):  acetaminophen     Tablet .. 650 milliGRAM(s) Oral every 6 hours PRN Mild Pain (1 - 3)  zolpidem 5 milliGRAM(s) Oral at bedtime PRN Insomnia

## 2023-10-30 NOTE — CONSULT NOTE ADULT - SUBJECTIVE AND OBJECTIVE BOX
Department of Cardiology                                                               Division of Interventional Cardiology                                                               [facility] / [address] [place] [address]                                                                                 [phone]                                                                                                                               Interventional Cardiology Consult / Pre-Procedure Note    Brief Hospital Course:  77 year old male with PMH hypothyroidism, HTN, HLD, ulcerative colitis.   10/28 presented to [place] ED with SSCP. Admitted IW STEMI. Emergent LHC.    < from: Cardiac Catheterization (10.28.23 @ 15:55) >  Cath Lab Report    Diagnostic Cardiologist:       Vanessa Redding MD   Interventional Cardiologist:   Vanessa Redding MD   Procedures Performed                1.    Arterial Access - Right Radial   2.    Left Heart Cath   3.    Diagnostic Coronary Angiography   4.    PCI: DWAINE   5.    IVUS   Indications:                Myocardial infarction with ST elevation (STEMI)  Conclusions:        Acute STEMI presenting to [facility].        ECG suggesting deepthi-lateral STEMI with inferior involvement as well (the wrap-around portion of the apical LAD).         EF 40%.        Culprit in the prox and mid LAD.       s/p 3 DWAINE. IVUS used to guide post-dilatation balloon.       Additional severe disease noted in the Lcx.    Recommendations:        DAPT for 1 year.        Aggressive lipid therapy.        Trend CE.        Echocardiogram.       Start beta blocker.        Staged PCI to the Lcx prior to d/c.  Acute complication:    No complications   < end of copied text >  10/29 asymptomatic NSVT, metoprolol up titrated. TTE with LVSD, EF 40-45%, Apical septal segment, apex, and mid inferoseptal segment WMAs, G1DD.  10/30 for staged PCI to Circ today    Subjective/ROS: no c/o offered. Denies CP, SOB, palpitations, N/V, fever/chills, abd pain, numbness/tingling/weakness, other c/o at this time.  ROS negative x 10 systems except as documented as above.      HPI:  77-year-old male with history of HTN, HLD as in his baseline state of good health up until approximately 30 to 45 minutes prior to arrival here when while at rest developed anterior substernal nonradiating chest pressure/heaviness that was persisting with slight shortness of breath and slight sweating. On presentation to the ED, the  EKG was significant for inferior ST elevation with subtle ST depressions in V1 and V2. Patient has been transferred to ICU post catherization. He currently denies any fever, HA, cp, sob, abd pain, N/V/D/C. Pt states chest pain has improved and now feels like chest soreness.    ED course:   Vitals : T 97.7 /77, RR 25, SpO2 95% RA  HR 84  Labs: troponin 96.2, WBC 11.50  EKG:  inferior ST elevation with subtle ST depressions in V1 and V2.  (28 Oct 2023 17:11)      PAST MEDICAL & SURGICAL HISTORY:  Hypothyroid  Colitis  History of left hip replacement    FAMILY HISTORY:  No pertinent family history in first degree relatives    Social History:  + former smoker (quit about 20 years ago, was a 1ppk per week for about 30 years)  - denies etoh use  + did inhaled cocaine in the 80s (no IVDU) (28 Oct 2023 17:11)      MEDICATIONS  (STANDING):  atorvastatin 80 milliGRAM(s) Oral at bedtime  chlorhexidine 2% Cloths 1 Application(s) Topical <User Schedule>  levothyroxine 88 MICROGram(s) Oral daily  metoprolol tartrate 50 milliGRAM(s) Oral two times a day  ticagrelor 90 milliGRAM(s) Oral every 12 hours    MEDICATIONS  (PRN):  zolpidem 5 milliGRAM(s) Oral at bedtime PRN Insomnia    Allergies: penicillin (Swelling)      T(C): 36.7 (10-30-23 @ 08:04), Max: 36.7 (10-30-23 @ 04:00)  HR: 69 (10-30-23 @ 08:00) (64 - 80), SR  BP: 166/90 (10-30-23 @ 08:00) (118/70 - 166/90)  RR: 28 (10-30-23 @ 08:00) (12 - 34)  SpO2: 95% (10-30-23 @ 08:00) (92% - 96%)    Tele: SR 60-70s, NVST mostly in am on 10/29    I&O's Summary  29 Oct 2023 07:01  -  30 Oct 2023 07:00  --------------------------------------------------------  IN: 350 mL / OUT: 1800 mL / NET: -1450 mL      < from: 12 Lead ECG (10.29.23 @ 10:22) >  Ventricular Rate 71 BPM  Atrial Rate 71 BPM  P-R Interval 146 ms  QRS Duration 92 ms  Q-T Interval 372 ms  QTC Calculation(Bazett) 404 ms  P Axis 27 degrees  R Axis 25 degrees  T Axis 29 degrees  Diagnosis Line Normal sinus rhythm  Inferior infarct , age undetermined  Abnormal ECG  When compared with ECG of 29-OCT-2023 06:49,  No significant change was found  < end of copied text >    < from: 12 Lead ECG (10.29.23 @ 06:49) >  Ventricular Rate 71 BPM  Atrial Rate 71 BPM  P-R Interval 142 ms  QRS Duration 86 ms  Q-T Interval 378 ms  QTC Calculation(Bazett) 410 ms  P Axis 23 degrees  R Axis 17 degrees  T Axis 22 degrees  Diagnosis Line Normal sinus rhythm  Nonspecific ST abnormality  Abnormal ECG  No previous ECGs available  < end of copied text >    < from: 12 Lead ECG (10.28.23 @ 17:04) >  Ventricular Rate 82 BPM  Atrial Rate 82 BPM  P-R Interval 150 ms  QRS Duration 82 ms  Q-T Interval 358 ms  QTC Calculation(Bazett) 418 ms  P Axis 74 degrees  R Axis 28 degrees  T Axis 47 degrees  Diagnosis Line Normal sinus rhythm  Early repolarization  Normal ECG  When compared with ECG of 28-OCT-2023 15:15, (Unconfirmed)  ST less elevated in Inferior leads  ST less elevated in Lateral leads  < end of copied text >    < from: 12 Lead ECG (10.28.23 @ 15:15) >  Ventricular Rate 113 BPM  Atrial Rate 113 BPM  P-R Interval 134 ms  QRS Duration 86 ms  Q-T Interval 320 ms  QTC Calculation(Bazett) 438 ms  P Axis 64 degrees  R Axis 43 degrees  T Axis 70 degrees  Diagnosis Line *** Critical TestResult: STEMI  Sinus tachycardia  Possible Left atrial enlargement  Inferior-posterior infarct , possibly acute  Lateral injury pattern  ** ** ACUTE MI / STEMI ** **  Abnormal ECG  When compared with ECG of 01-OCT-2009 10:23,  Inferior-posterior infarct is now present  ST elevation now present in Inferior leads  ST elevation now present in Lateral leads  < end of copied text >    < from: TTE W or WO Ultrasound Enhancing Agent (10.29.23 @ 08:40) >  CONCLUSIONS:   1. Technically difficult image quality.   2. Left ventricular systolic function is mildly decreased with an ejection fraction visually estimated at 40 to 45 %. Regional wall motion abnormalities present.   3. Apical septal segment, apex, and mid inferoseptal segment are abnormal.   4. There is mild (grade 1) left ventricular diastolic dysfunction.   5. Normal right ventricular cavity size, wall thickness, and systolic function.   6. Trileaflet aortic valve with normal systolic excursion. calcification of the aortic valve leaflets.   7. Trace mitral regurgitation.   8. Trace tricuspid regurgitation.   9. Trace pulmonic regurgitation.  10. The inferior vena cava is normal in size measuring 1.75 cm in diameter, (normal <2.1cm) with abnormal inspiratory collapse (abnormal <50%) consistent with mildly elevated right atrial pressure (~8, range 5-10mmHg).  < end of copied text >      LABS:	 	                        15.3   10.63 )-----------( 232      ( 30 Oct 2023 06:10 )             47.1     10-30    137  |  103  |  15  ----------------------------<  113<H>  3.8   |  28  |  0.87    Ca    9.0      30 Oct 2023 06:10  Phos  2.6     10-30  Mg     2.0     10-30  TPro  7.5  /  Alb  3.8  /  TBili  0.8  /  DBili  x   /  AST  64<H>  /  ALT  31  /  AlkPhos  65  10-29    Troponin I, High Sensitivity (10.29.23 @ 19:30) 56514.8 ng/L  Troponin I, High Sensitivity (10.29.23 @ 08:23) 70776.0 ng/L  Troponin I, High Sensitivity (10.28.23 @ 18:11) 3643.6 ng/L  Troponin I, High Sensitivity (10.28.23 @ 15:30) 96.2 ng/L    Lipid Profile (10.28.23 @ 18:11)    Cholesterol: 162 mg/dL   HDL Cholesterol: 55 mg/dL   Non HDL Cholesterol: 107 mg/dL   LDL Cholesterol Calculated: 96 mg/dL    A1C with Estimated Average Glucose (10.28.23 @ 18:11) 6.4%   Estimated Average Glucose: 137 mg/dL      Physical Exam:  Constitutional: NAD  Neuro: A+O x 3, non-focal, speech clear and intact  HEENT: NC/AT, PERRL, EOMI, anicteric sclerae, oral mucosa pink and moist  Neck: supple, no JVD  CV: regular rate, regular rhythm, +S1S2, no murmurs or rub  Pulm/chest: lung sounds CTA and equal bilaterally, no accessory muscle use noted  Abd: soft, NT, ND, +BS  Ext: POLO x 4, no C/C/E  Pulses: R radial 2+, bilat DP 2+  Skin: warm, well perfused  Psych: calm, appropriate affect

## 2023-10-30 NOTE — PROGRESS NOTE ADULT - SUBJECTIVE AND OBJECTIVE BOX
Interval Events: Patient seen and examined at bedside. No acute overnight events. No episodes of VT overnight. Patient states that he feels "very good". Denies fevers, chills, headache, lighteadedness/dizziness, chest pain, palpitations, sob, abdominal pain, n/v.    Review of Systems:  Constitutional: No fever, chills, fatigue  Neuro: No headache, numbness, weakness  Resp: No cough, wheezing, shortness of breath  CVS: No chest pain, palpitations, leg swelling  GI: No abdominal pain, nausea, vomiting, diarrhea   : No dysuria, frequency, incontinence  Skin: No itching, burning, rashes, or lesions   Msk: No joint pain or swelling  Psych: No depression, anxiety, mood swings    ICU Vital Signs Last 24 Hrs  T(C): 36.7 (30 Oct 2023 08:04), Max: 36.7 (30 Oct 2023 04:00)  T(F): 98.1 (30 Oct 2023 08:04), Max: 98.1 (30 Oct 2023 08:04)  HR: 75 (30 Oct 2023 09:40) (64 - 80)  BP: 153/94 (30 Oct 2023 09:40) (118/70 - 166/90)  BP(mean): 112 (30 Oct 2023 08:10) (86 - 120)  ABP: --  ABP(mean): --  RR: 18 (30 Oct 2023 09:40) (12 - 33)  SpO2: 94% (30 Oct 2023 09:40) (92% - 96%)    O2 Parameters below as of 30 Oct 2023 09:40  Patient On (Oxygen Delivery Method): room air              10-29-23 @ 07:01  -  10-30-23 @ 07:00  --------------------------------------------------------  IN: 350 mL / OUT: 1800 mL / NET: -1450 mL        CAPILLARY BLOOD GLUCOSE          I&O's Summary    29 Oct 2023 07:01  -  30 Oct 2023 07:00  --------------------------------------------------------  IN: 350 mL / OUT: 1800 mL / NET: -1450 mL        Physical Exam:   Gen: NAD  Neuro: A&Ox3, nonfocal  HEENT: Moist mucous membranes, EOMI, PERRL, conjunctiva and sclera clear  Resp: CTAB  CVS: RRR, S1S2, no m/r/g  Abd: Soft, Nontender, Nondistended; Bowel sounds present  Ext: WWP, no clubbing or cyanosis   Skin: warm, dry    Meds:    metoprolol tartrate Oral    atorvastatin Oral  levothyroxine Oral      acetaminophen     Tablet .. Oral PRN  zolpidem Oral PRN      heparin   Injectable SubCutaneous  ticagrelor Oral        potassium chloride   Powder Oral      chlorhexidine 2% Cloths Topical                              15.3   10.63 )-----------( 232      ( 30 Oct 2023 06:10 )             47.1       10-30    137  |  103  |  15  ----------------------------<  113<H>  3.8   |  28  |  0.87    Ca    9.0      30 Oct 2023 06:10  Phos  2.6     10-30  Mg     2.0     10-30    TPro  7.5  /  Alb  3.8  /  TBili  0.8  /  DBili  x   /  AST  64<H>  /  ALT  31  /  AlkPhos  65  10-29      CARDIAC MARKERS ( 29 Oct 2023 19:30 )  x     / x     / 453 U/L / x     / x          PT/INR - ( 28 Oct 2023 15:30 )   PT: 11.9 sec;   INR: 1.02 ratio         PTT - ( 28 Oct 2023 15:30 )  PTT:33.4 sec  Urinalysis Basic - ( 30 Oct 2023 06:10 )    Color: x / Appearance: x / SG: x / pH: x  Gluc: 113 mg/dL / Ketone: x  / Bili: x / Urobili: x   Blood: x / Protein: x / Nitrite: x   Leuk Esterase: x / RBC: x / WBC x   Sq Epi: x / Non Sq Epi: x / Bacteria: x                  Radiology:    Bedside Ultrasound:    Tubes/Lines:      GLOBAL ISSUE/BEST PRACTICE:  Analgesia:  Sedation:  HOB elevation: Y  Stress ulcer prophylaxis:  VTE prophylaxis:  Glycemic control:  Nutrition:    CODE STATUS:        Interval Events: Patient seen and examined at bedside. No acute overnight events. No episodes of VT overnight. Patient states that he feels "very good". Denies fevers, chills, headache, lighteadedness/dizziness, chest pain, palpitations, sob, abdominal pain, n/v. Plan for staged PCI of LCx today.    Review of Systems:  Constitutional: No fever, chills, fatigue  Neuro: No headache, numbness, weakness  Resp: No cough, wheezing, shortness of breath  CVS: No chest pain, palpitations, leg swelling  GI: No abdominal pain, nausea, vomiting, diarrhea   : No dysuria, frequency, incontinence  Skin: No itching, burning, rashes, or lesions   Msk: No joint pain or swelling  Psych: No depression, anxiety, mood swings    ICU Vital Signs Last 24 Hrs  T(C): 36.7 (30 Oct 2023 08:04), Max: 36.7 (30 Oct 2023 04:00)  T(F): 98.1 (30 Oct 2023 08:04), Max: 98.1 (30 Oct 2023 08:04)  HR: 75 (30 Oct 2023 09:40) (64 - 80)  BP: 153/94 (30 Oct 2023 09:40) (118/70 - 166/90)  BP(mean): 112 (30 Oct 2023 08:10) (86 - 120)  ABP: --  ABP(mean): --  RR: 18 (30 Oct 2023 09:40) (12 - 33)  SpO2: 94% (30 Oct 2023 09:40) (92% - 96%)    O2 Parameters below as of 30 Oct 2023 09:40  Patient On (Oxygen Delivery Method): room air              10-29-23 @ 07:01  -  10-30-23 @ 07:00  --------------------------------------------------------  IN: 350 mL / OUT: 1800 mL / NET: -1450 mL        CAPILLARY BLOOD GLUCOSE          I&O's Summary    29 Oct 2023 07:01  -  30 Oct 2023 07:00  --------------------------------------------------------  IN: 350 mL / OUT: 1800 mL / NET: -1450 mL        Physical Exam:   Gen: NAD  Neuro: A&Ox3, interactive  HEENT: Moist mucous membranes, EOMI, PERRL, conjunctiva and sclera clear  Resp: CTAB  CVS: RRR, S1S2, no m/r/g  Abd: Soft, Nontender, Nondistended; Bowel sounds present  Ext: WWP, no clubbing or cyanosis   Skin: warm, dry    Meds:    metoprolol tartrate Oral    atorvastatin Oral  levothyroxine Oral      acetaminophen     Tablet .. Oral PRN  zolpidem Oral PRN      heparin   Injectable SubCutaneous  ticagrelor Oral        potassium chloride   Powder Oral      chlorhexidine 2% Cloths Topical                              15.3   10.63 )-----------( 232      ( 30 Oct 2023 06:10 )             47.1       10-30    137  |  103  |  15  ----------------------------<  113<H>  3.8   |  28  |  0.87    Ca    9.0      30 Oct 2023 06:10  Phos  2.6     10-30  Mg     2.0     10-30    TPro  7.5  /  Alb  3.8  /  TBili  0.8  /  DBili  x   /  AST  64<H>  /  ALT  31  /  AlkPhos  65  10-29      CARDIAC MARKERS ( 29 Oct 2023 19:30 )  x     / x     / 453 U/L / x     / x          PT/INR - ( 28 Oct 2023 15:30 )   PT: 11.9 sec;   INR: 1.02 ratio         PTT - ( 28 Oct 2023 15:30 )  PTT:33.4 sec  Urinalysis Basic - ( 30 Oct 2023 06:10 )    Color: x / Appearance: x / SG: x / pH: x  Gluc: 113 mg/dL / Ketone: x  / Bili: x / Urobili: x   Blood: x / Protein: x / Nitrite: x   Leuk Esterase: x / RBC: x / WBC x   Sq Epi: x / Non Sq Epi: x / Bacteria: x                  Radiology:      Tubes/Lines: None      GLOBAL ISSUE/BEST PRACTICE:  Analgesia: Y  Sedation: NA  HOB elevation: Y  Stress ulcer prophylaxis: NA  VTE prophylaxis: Y  Glycemic control: Y  Nutrition: Y    CODE STATUS: FULL Interval Events: Patient seen and examined at bedside. No acute overnight events. No episodes of VT overnight. Patient states that he feels "very good". Denies fevers, chills, headache, lighteadedness/dizziness, chest pain, palpitations, sob, abdominal pain, n/v. Plan for staged PCI of LCx today.    Review of Systems:  Constitutional: No fever, chills, fatigue  Neuro: No headache, numbness, weakness  Resp: No cough, wheezing, shortness of breath  CVS: No chest pain, palpitations, leg swelling  GI: No abdominal pain, nausea, vomiting, diarrhea   : No dysuria, frequency, incontinence  Skin: No itching, burning, rashes, or lesions   Msk: No joint pain or swelling  Psych: No depression, anxiety, mood swings    ICU Vital Signs Last 24 Hrs  T(C): 36.7 (30 Oct 2023 08:04), Max: 36.7 (30 Oct 2023 04:00)  T(F): 98.1 (30 Oct 2023 08:04), Max: 98.1 (30 Oct 2023 08:04)  HR: 75 (30 Oct 2023 09:40) (64 - 80)  BP: 153/94 (30 Oct 2023 09:40) (118/70 - 166/90)  BP(mean): 112 (30 Oct 2023 08:10) (86 - 120)  ABP: --  ABP(mean): --  RR: 18 (30 Oct 2023 09:40) (12 - 33)  SpO2: 94% (30 Oct 2023 09:40) (92% - 96%)    O2 Parameters below as of 30 Oct 2023 09:40  Patient On (Oxygen Delivery Method): room air              10-29-23 @ 07:01  -  10-30-23 @ 07:00  --------------------------------------------------------  IN: 350 mL / OUT: 1800 mL / NET: -1450 mL        CAPILLARY BLOOD GLUCOSE          I&O's Summary    29 Oct 2023 07:01  -  30 Oct 2023 07:00  --------------------------------------------------------  IN: 350 mL / OUT: 1800 mL / NET: -1450 mL        Physical Exam:   Gen: NAD  Neuro: A&Ox3, interactive  HEENT: Moist mucous membranes, EOMI, PERRL, conjunctiva and sclera clear  Resp: CTAB  CVS: RRR, S1S2, no m/r/g  Abd: Soft, Nontender, Nondistended; Bowel sounds present  Ext: WWP, no clubbing or cyanosis   Skin: warm, dry    Meds:    metoprolol tartrate Oral    atorvastatin Oral  levothyroxine Oral      acetaminophen     Tablet .. Oral PRN  zolpidem Oral PRN      heparin   Injectable SubCutaneous  ticagrelor Oral        potassium chloride   Powder Oral      chlorhexidine 2% Cloths Topical                              15.3   10.63 )-----------( 232      ( 30 Oct 2023 06:10 )             47.1       10-30    137  |  103  |  15  ----------------------------<  113<H>  3.8   |  28  |  0.87    Ca    9.0      30 Oct 2023 06:10  Phos  2.6     10-30  Mg     2.0     10-30    TPro  7.5  /  Alb  3.8  /  TBili  0.8  /  DBili  x   /  AST  64<H>  /  ALT  31  /  AlkPhos  65  10-29      CARDIAC MARKERS ( 29 Oct 2023 19:30 )  x     / x     / 453 U/L / x     / x          PT/INR - ( 28 Oct 2023 15:30 )   PT: 11.9 sec;   INR: 1.02 ratio         PTT - ( 28 Oct 2023 15:30 )  PTT:33.4 sec  Urinalysis Basic - ( 30 Oct 2023 06:10 )    Color: x / Appearance: x / SG: x / pH: x  Gluc: 113 mg/dL / Ketone: x  / Bili: x / Urobili: x   Blood: x / Protein: x / Nitrite: x   Leuk Esterase: x / RBC: x / WBC x   Sq Epi: x / Non Sq Epi: x / Bacteria: x                Tubes/Lines: None      GLOBAL ISSUE/BEST PRACTICE:  Analgesia: Y  Sedation: NA  HOB elevation: Y  Stress ulcer prophylaxis: NA  VTE prophylaxis: Y  Glycemic control: Y  Nutrition: Y    CODE STATUS: FULL

## 2023-10-30 NOTE — CHART NOTE - NSCHARTNOTEFT_GEN_A_CORE
Department of Cardiology                                                                     Division of Interventional Cardiology                                                                  [facility]/ [place] [address]                                                                             Telephone: [phone]                                                               Post Cardiac Catheterization Chart Note      < from: Cardiac Catheterization (10.30.23 @ 09:02) >  Cath Lab Report    Interventional Cardiologist:       Vanessa Redding MD   Referring Physician:     Rosio Dubois,    Procedures Performed   1.   Arterial Access - Right Radial   2.   PCI: DWAINE   3.   IVUS   Indications:           Staged Procedures   Conclusions: Successful staged PCI to the Lcx with two DWAINE   Recommendations: DAPT for 9 mo. Aggressive lipid therapy.    Acute complications: No complications   < end of copied text >      Patient without complaints. Denies CP, SOB, palpitations, N/V, fever/chills, abd pain, numbness/tingling/weakness, other c/o at this time.    A+O x 3, neurologically intact  RRA access site stable (clean, dry, intact, without bleeding, heat, erythema, or hematoma). Radial band successfully removed without incident.  RUE motor, neuro, circ intact.  Hemodynamically stable, neurologically intact, VS stable, afebrile    Post PCI ECG: SR with biphasic T waves in anterior leads, no acute or significant changes from ECG done prior to today's PCI      A/P: s/p LHC  10/28 DWAINE x 2 to mLAD  10/30 DWAINE x 2 to Circ      post cath/PCI and post radial band removal routine VS, access site, neuro-vascular monitoring and RUE post access precautions ordered  post cath hydration not ordered d/t minimal dye load and LVSD  May get OOB 30 minutes after radial band removed if wrist and hemodynamics remain stable   EKG post cath done  f/u labs and EKG in am  continue dual anti platelet therapy with aspirin AND ticagrelor  Pt education provided/reinforced re: importance of strict adherence to uninterrupted DAPT for minimum of 9-12 months (cardiologist will determine duration)  cardiac rehab info provided/referral and communication to cardiac rehab to be submitted by Dr Redding' office during follow-up visit  continue statin, beta blocker, ARNI  may resume prior diet  Lifestyle modifications discussed to reduce cardiovascular risk factors including weight reduction, smoking cessation (referral provided if applicable), medication compliance, and routine follow up with Cardiologist to track your BMI, cholesterol, and glucose levels.   Discharge in am if stable  follow-up on 11/3 with Dr Redding for post PCI check  follow-up in 2 weeks with outpatient/referring cardiologist  continue home medication regimen as appropriate  remainder of plan per primary team/non-interventional cardiology   above d/w ICU team

## 2023-10-30 NOTE — CONSULT NOTE ADULT - ASSESSMENT
78 y/o Male with HTN, HLD, Hypothyroidism, Ulcerative Colitis presents to  ED complaining of with acute onset substernal chest pain found to have STEMI.    10/30 s/p emergent LHC.   Acute STEMI presenting to [facility].        ECG suggesting deepthi-lateral STEMI with inferior involvement as well (the wrap-around portion of the apical LAD).         EF 40%.        Culprit in the prox and mid LAD.       s/p 3 DWAINE. IVUS used to guide post-dilatation balloon.       Additional severe disease noted in the Lcx.    Recommendations:        DAPT for 1 year.        Aggressive lipid therapy.        Trend CE.        Echocardiogram.       Start beta blocker.        Staged PCI to the Lcx prior to d/c.    NPO since MN for staged PCI to Circ  no pre-cath hydration d/t LVSD  EKG and am labs noted    remains in ICU for post STEMI monitoring   continue dual anti platelet therapy with aspirin AND ticagrelor  Pt education provided/reinforced re: importance of strict adherence to uninterrupted DAPT for 12 months   cardiac rehab info provided/referral and communication to cardiac rehab to be completed during f/u appt with Dr Redding  continue statin, beta blocker  need for ACEI/ARB/ARNI deferred to non-interventional cardiology   remainder of GDMT for HF deferred to primary cardiologist  Lifestyle modifications discussed to reduce cardiovascular risk factors including weight reduction, smoking cessation (referral provided if applicable), medication compliance, and routine follow up with Cardiologist to track your BMI, cholesterol, and glucose levels.   discharge in 10/31 vs 11/1 if stable  follow-up 7-10 days with Dr Redding for post PCI check  follow-up in 2 weeks with outpatient/referring cardiologist (pt will f/u with Dr Betts)  continue home medication regimen as appropriate  remainder of plan per primary team/non-interventional cardiology

## 2023-10-30 NOTE — DISCHARGE NOTE PROVIDER - NSDCCPCAREPLAN_GEN_ALL_CORE_FT
PRINCIPAL DISCHARGE DIAGNOSIS  Diagnosis: STEMI (ST elevation myocardial infarction)  Assessment and Plan of Treatment:      PRINCIPAL DISCHARGE DIAGNOSIS  Diagnosis: STEMI (ST elevation myocardial infarction)  Assessment and Plan of Treatment: A heart attack is a condition that occurs when your heart does not get enough oxygen. When this happens, the heart muscle begins to die. This can cause permanent damage if not treated right away. A heart attack is a medical emergency. This condition may be called a myocardial infarction, or MI.  - Please take you atorvastatin, sacubitril-valsartan (Entresto), ticagrelor (Brilinta) and metoprolol as prescribed  - Please continue to take aspirin 81mg once daily  Follow up with your PCP and cardiologist within 1 week

## 2023-10-30 NOTE — PROGRESS NOTE ADULT - ASSESSMENT
76 y/o Male with HTN, HLD, Pre-DM (a1c 6.4%), Hypothyroidism, Ulcerative Colitis presents to  ED complaining of with acute onset substernal chest pain found to have STEMI      EKG on admission with inferior/lat ST elevation with subtle ST depressions in V1 and V2.   Taken emergently to Cath Lab, s/p DWAINE x3 to LAD. Radial band removed, no hematoma.   Plan for staged PCI to LCx, 10/30  c/w DAPT - ASA and Brilinta    Meds to beds on discharge for 30 day supply of DAPT  Estimated EF 40% on LV gram.   TTE with an EF of 40-45% and abnormal apex, inferoseptal walls.   c/w high dose Statin.  Continue Lopressor 50mg BID (recently increased due to NSVT overnight), Will need to be switched to succinate tomorrow AM  Needs ACE/ARB/ARNI. If covered by insurance, would prefer initiation of Entresto 24-26mg BID given his LV dysfunction  Farxiga or Jardiance prior to discharge as well   tele monitoring  monitor hemodynamics carefully    - Monitor and replete lytes, keep K>4 and Mg >2  - Further cardiac workup will depend on clinical course.   - All other workup per primary team

## 2023-10-30 NOTE — DISCHARGE NOTE PROVIDER - OTHER REASON DETAILS
[Follow-up Visit ___] : a follow-up visit  for [unfilled] referral to be completed during outpt f/u visit by Dr Redding

## 2023-10-30 NOTE — PHARMACOTHERAPY INTERVENTION NOTE - COMMENTS
Metoprolol, ticagrelor, Entresto, asa and atorvastatin are filled through meds to beds at [facility] and will be delivered to patient bedside prior to discharge (10/31?).

## 2023-10-30 NOTE — CONSULT NOTE ADULT - CONSULT REASON
77y A1C with Estimated Average Glucose Result: 6.4 % (10-28-23 @ 18:11)   diabetes mellitus uncontrolled type
STEMI
Anterior STEMI
IW STEMI

## 2023-10-30 NOTE — DISCHARGE NOTE PROVIDER - NSDCMRMEDTOKEN_GEN_ALL_CORE_FT
aspirin 81 mg oral delayed release tablet: 1 tab(s) orally once a day  atorvastatin 80 mg oral tablet: 1 tab(s) orally once a day (at bedtime)  Entresto 24 mg-26 mg oral tablet: 1 tab(s) orally every 12 hours  levothyroxine 88 mcg (0.088 mg) oral tablet: 1 tab(s) orally once a day  Metoprolol Tartrate 25 mg oral tablet: 1 tab(s) orally 2 times a day  simvastatin 20 mg oral tablet: 1 tab(s) orally once a day (at bedtime)  ticagrelor 90 mg oral tablet: 1 tab(s) orally every 12 hours  Toprol- mg oral tablet, extended release: 1 tab(s) orally once a day  zolpidem 5 mg oral tablet: 1 tab(s) orally once a day (at bedtime), As needed, Insomnia   aspirin 81 mg oral delayed release tablet: 1 tab(s) orally once a day  aspirin 81 mg oral delayed release tablet: 1 tab(s) orally once a day  atorvastatin 80 mg oral tablet: 1 tab(s) orally once a day (at bedtime)  atorvastatin 80 mg oral tablet: 1 tab(s) orally once a day (at bedtime)  Entresto 24 mg-26 mg oral tablet: 1 tab(s) orally every 12 hours  levothyroxine 88 mcg (0.088 mg) oral tablet: 1 tab(s) orally once a day  metoprolol succinate 100 mg oral tablet, extended release: 1 tab(s) orally once a day  Metoprolol Tartrate 25 mg oral tablet: 1 tab(s) orally 2 times a day  sacubitril-valsartan 24 mg-26 mg oral tablet: 1 tab(s) orally once a day  simvastatin 20 mg oral tablet: 1 tab(s) orally once a day (at bedtime)  ticagrelor 90 mg oral tablet: 1 tab(s) orally every 12 hours  ticagrelor 90 mg oral tablet: 1 tab(s) orally every 12 hours  Toprol- mg oral tablet, extended release: 1 tab(s) orally once a day  zolpidem 5 mg oral tablet: 1 tab(s) orally once a day (at bedtime), As needed, Insomnia   aspirin 81 mg oral delayed release tablet: 1 tab(s) orally once a day  atorvastatin 80 mg oral tablet: 1 tab(s) orally once a day (at bedtime)  levothyroxine 88 mcg (0.088 mg) oral tablet: 1 tab(s) orally once a day  metoprolol succinate 100 mg oral tablet, extended release: 1 tab(s) orally once a day  sacubitril-valsartan 24 mg-26 mg oral tablet: 1 tab(s) orally once a day  ticagrelor 90 mg oral tablet: 1 tab(s) orally every 12 hours  zolpidem 5 mg oral tablet: 1 tab(s) orally once a day (at bedtime) As needed Insomnia

## 2023-10-30 NOTE — CONSULT NOTE ADULT - PROBLEM SELECTOR RECOMMENDATION 9
Type: prediabetes  A1c 6.4%   FU appt: [facility]  DSC recommendations: return to home regimen diet/exercise   diabetes education provided  Diabetes support info and cell # [phone] given   Goal 100-180 mg/dL; 140-180 mg/dL in critical care areas

## 2023-10-30 NOTE — DISCHARGE NOTE PROVIDER - NSDCCPTREATMENT_GEN_ALL_CORE_FT
PRINCIPAL PROCEDURE  Procedure: Left heart catheterization  Findings and Treatment: 10/28      SECONDARY PROCEDURE  Procedure: Placement of LAD coronary artery stent  Findings and Treatment: 10/28 DWAINE x 3 to mid and proximal LAD    Procedure: Angioplasty of circumflex coronary artery with insertion of stent  Findings and Treatment: 10/30 DWAINE x 2 to Circ

## 2023-10-30 NOTE — PROGRESS NOTE ADULT - SUBJECTIVE AND OBJECTIVE BOX
Patient is a 77y old  Male who presents with a chief complaint of STEMI (29 Oct 2023 11:56)    PAST MEDICAL & SURGICAL HISTORY:  Hypothyroid      Colitis      History of left hip replacement        ABUNDIO CALDERÓN   77y    Male    BRIEF HOSPITAL COURSE:    Review of Systems:    no CP SOB                    All other ROS are negative.    Allergies    penicillin (Swelling)    Intolerances          ICU Vital Signs Last 24 Hrs  T(C): 36.6 (29 Oct 2023 23:27), Max: 36.6 (29 Oct 2023 07:55)  T(F): 97.8 (29 Oct 2023 23:27), Max: 97.9 (29 Oct 2023 21:18)  HR: 67 (30 Oct 2023 02:00) (64 - 80)  BP: 151/84 (30 Oct 2023 02:00) (125/72 - 163/81)  BP(mean): 90 (30 Oct 2023 01:00) (86 - 115)  ABP: --  ABP(mean): --  RR: 18 (30 Oct 2023 02:00) (16 - 36)  SpO2: 95% (30 Oct 2023 02:00) (92% - 96%)    O2 Parameters below as of 29 Oct 2023 19:00  Patient On (Oxygen Delivery Method): room air          Physical Examination:    General: NAD     HEENT: No JVD    PULM: bilateral BS     CVS: s1 s2 reg    ABD: soft     EXT: no edema     SKIN: warm    Neuro: Alert NF          LABS:                        15.2   11.83 )-----------( 220      ( 29 Oct 2023 08:23 )             46.5     10-29    139  |  103  |  16  ----------------------------<  120<H>  4.3   |  29  |  1.10    Ca    9.2      29 Oct 2023 19:30  Phos  3.1     10-29  Mg     2.3     10-29    TPro  7.5  /  Alb  3.8  /  TBili  0.8  /  DBili  x   /  AST  64<H>  /  ALT  31  /  AlkPhos  65  10-29      CARDIAC MARKERS ( 29 Oct 2023 19:30 )  x     / x     / 453 U/L / x     / x          CAPILLARY BLOOD GLUCOSE        PT/INR - ( 28 Oct 2023 15:30 )   PT: 11.9 sec;   INR: 1.02 ratio         PTT - ( 28 Oct 2023 15:30 )  PTT:33.4 sec  Urinalysis Basic - ( 29 Oct 2023 19:30 )    Color: x / Appearance: x / SG: x / pH: x  Gluc: 120 mg/dL / Ketone: x  / Bili: x / Urobili: x   Blood: x / Protein: x / Nitrite: x   Leuk Esterase: x / RBC: x / WBC x   Sq Epi: x / Non Sq Epi: x / Bacteria: x      CULTURES:      Medications:  MEDICATIONS  (STANDING):  aspirin enteric coated 81 milliGRAM(s) Oral daily  atorvastatin 80 milliGRAM(s) Oral at bedtime  chlorhexidine 2% Cloths 1 Application(s) Topical <User Schedule>  enoxaparin Injectable 40 milliGRAM(s) SubCutaneous every 24 hours  levothyroxine 88 MICROGram(s) Oral daily  metoprolol tartrate 50 milliGRAM(s) Oral two times a day  ticagrelor 90 milliGRAM(s) Oral every 12 hours    MEDICATIONS  (PRN):  zolpidem 5 milliGRAM(s) Oral at bedtime PRN Insomnia        10-28 @ 07:01  -  10-29 @ 07:00  --------------------------------------------------------  IN: 0 mL / OUT: 1300 mL / NET: -1300 mL    10-29 @ 07:01  -  10-30 @ 02:33  --------------------------------------------------------  IN: 350 mL / OUT: 1800 mL / NET: -1450 mL        RADIOLOGY/IMAGING/ECHO    Critical care point of care ultrasound:    Assessment/Plan:    76 yo man with Hx htn, HLD, Sudden CP while gambling at the local [employer].       Inferolateral STEMI, s/p cath with DWAINE x 3 to LAD.        Post cath complicated by WCT NSVT.  Now quiescent electrolytes checked and are OK     LV gram  EF 40%    For staged PCI to CX today.    DAPT/statin/BB increased    Check 2 D echo     ARB prior to discharge     DVT P

## 2023-10-30 NOTE — PROGRESS NOTE ADULT - ASSESSMENT
76 y/o Male with HTN, HLD, Hypothyroidism, Ulcerative Colitis presents to  ED complaining of with acute onset substernal chest pain with:      1. Anterior STEMI         Neuro:  - Alert and oriented, mentating at baseline.   - tylenol for pain control PRN.     Resp:   - Maintaining O2>93% on RA    CV:   - EKG on admission with inferior ST elevation with subtle ST depressions in V1 and V2.   - s/p DWAINE x3 to LAD. Radial band removed, no hematoma. Pulses and sensation intact.   - DAPT w/ ASA and Brilinta to promote stent patentcy.    - Estimated EF 40% on LV gram.  - 10/29 TTE: ejection fraction visually estimated at 40 to 45 %. Regional wall motion abnormalities present.   - Initiated on Statin  - Lipid panel: LDL 96 (goal <70)  - maintain MAP >65.   - cont. metoprolol 50mg BID  - s/p PCI to LCx. 2 stents placed on 10/30    GI:   - Dash diet, tolerating well.   - GI prophylaxis with Protonix.    Renal:   - Stable renal indicies.   - s/p gentle hydration for renal protection from contraction load.   - Voiding independently. Adequate urine output.   - Trend labs, replete lytes as needed to maintain K>4, Mg>2, Phos >3.    Endo:    - Maintain euthyroid.   - C/W Synthroid 88mcg    ID:   - Leukocytosis, afebrile. No overt s/s of underlying infectious etiology. Likely reactive.   - Monitor off abx. Trend WBC and fevers.     Heme:   - H/H stable.   - Trend H/H, transfuse for hgb <8.0 if necessary.   - No need for Full AC per Interventionalist.   - DVT prophylaxis with Heparin. SCD in place.     76 y/o Male with HTN, HLD, Hypothyroidism, Ulcerative Colitis presents to  ED complaining of with acute onset substernal chest pain with:      1. Anterior STEMI         Neuro:  - Alert and oriented, mentating at baseline.   - tylenol for pain control PRN.     Resp:   - Maintaining O2>93% on RA    CV:   - EKG on admission with inferior ST elevation with subtle ST depressions in V1 and V2.   - s/p DWAINE x3 to LAD  - DAPT w/ ASA and Brilinta to promote stent patentcy.    - Estimated EF 40% on LV gram.  - 10/29 TTE: ejection fraction visually estimated at 40 to 45 %. Regional wall motion abnormalities present.   - Initiated on Statin  - Lipid panel: LDL 96 (goal <70)  - cont. metoprolol 50mg BID  - s/p PCI to LCx. 2 stents placed on 10/30    GI:   - Dash diet, tolerating well.   - GI prophylaxis with Protonix.    Renal:   - Stable renal indicies.   - s/p gentle hydration for renal protection from contraction load.   - Voiding independently. Adequate urine output.   - Trend labs, replete lytes as needed to maintain K>4, Mg>2, Phos >3.    Endo:    - Maintain euthyroid.   - C/W Synthroid 88mcg    ID:   - Leukocytosis, afebrile. No overt s/s of underlying infectious etiology. Likely reactive.   - Monitor off abx. Trend WBC and fevers.     Heme:   - H/H stable.   - Trend H/H, transfuse for hgb <8.0 if necessary.   - No need for Full AC per Interventionalist.   - DVT prophylaxis with Heparin. SCD in place.     76 y/o Male with HTN, HLD, Hypothyroidism, Ulcerative Colitis presents to  ED complaining of with acute onset substernal chest pain with:      1. Anterior STEMI         Neuro:  - Alert and oriented, mentating at baseline.   - tylenol for pain control PRN.     Resp:   - Maintaining O2>93% on RA    CV:   - EKG on admission with inferior ST elevation with subtle ST depressions in V1 and V2.   - s/p DWAINE x3 to LAD  - DAPT w/ ASA and Brilinta to promote stent patentcy.    - Estimated EF 40% on LV gram.  - 10/29 TTE: ejection fraction visually estimated at 40 to 45 %. Regional wall motion abnormalities present.   - cont. Statin  - Lipid panel: LDL 96 (goal <70)  - cont. metoprolol 50mg BID  - s/p PCI to LCx. 2 stents placed on 10/30    GI:   - Dash diet, tolerating well.   - GI prophylaxis with Protonix.    Renal:   - Stable renal indicies.   - s/p gentle hydration for renal protection from contraction load.   - Voiding independently. Adequate urine output.   - Trend labs, replete lytes as needed to maintain K>4, Mg>2, Phos >3.    Endo:    - Maintain euthyroid.   - C/W Synthroid 88mcg    ID:   - Leukocytosis, afebrile. No overt s/s of underlying infectious etiology. Likely reactive.   - Monitor off abx. Trend WBC and fevers.     Heme:   - H/H stable.   - Trend H/H, transfuse for hgb <8.0 if necessary.   - No need for Full AC per Interventionalist.   - DVT prophylaxis with Heparin. SCD in place.

## 2023-10-30 NOTE — PROGRESS NOTE ADULT - CRITICAL CARE ATTENDING COMMENT
Upon my evaluation, this patient is at high risk for imminent or life threatening deterioration due to STEMI and other active medical issues which require my direct attention, intervention, and personal management.  I have personally spent >30 minutes  of critical care time exclusive of time spent on separate billing procedures. This includes review of laboratory data, radiology results, discussion with primary team\patient, and monitoring for potential decompensation. Interventions were performed as documented above.

## 2023-10-30 NOTE — DISCHARGE NOTE PROVIDER - CARE PROVIDER_API CALL
Leeann Betts  Cardiology  [address] [place] [address]  Phone: [phone]  Fax: [phone]  Follow Up Time: 2 weeks    Vanessa Redding  [address] [place] [address]  Phone: [phone]  Fax: (   )    -  Scheduled Appointment: 11/03/2023 10:15 AM

## 2023-10-30 NOTE — CONSULT NOTE ADULT - SUBJECTIVE AND OBJECTIVE BOX
Patient is a 77y old  Male who presents with a chief complaint of STEMI (30 Oct 2023 14:51)    Type: DX year known complications Endocrine Last seen Rx home Hx DKA/HHS, Glucometer checks, needs, weight, diet, exercise  diabetes education provided  Hx ASCVD, CKD, HF    HPI:  77-year-old male with history of HTN, HLD as in his baseline state of good health up until approximately 30 to 45 minutes prior to arrival here when while at rest developed anterior substernal nonradiating chest pressure/heaviness that was persisting with slight shortness of breath and slight sweating. On presentation to the ED, the  EKG was significant for inferior ST elevation with subtle ST depressions in V1 and V2. Patient has been transferred to ICU post catherization. He currently denies any fever, HA, cp, sob, abd pain, N/V/D/C. Pt states chest pain has improved and now feels like chest soreness.    ED course:   Vitals : T 97.7 /77, RR 25, SpO2 95% RA  HR 84  Labs: troponin 96.2, WBC 11.50  EKG:  inferior ST elevation with subtle ST depressions in V1 and V2.          (28 Oct 2023 17:11)      PAST MEDICAL & SURGICAL HISTORY:  Hypothyroid      Colitis      History of left hip replacement          Allergies    penicillin (Swelling)    Intolerances        MEDICATIONS  (STANDING):  atorvastatin 80 milliGRAM(s) Oral at bedtime  chlorhexidine 2% Cloths 1 Application(s) Topical <User Schedule>  heparin   Injectable 5000 Unit(s) SubCutaneous every 8 hours  levothyroxine 88 MICROGram(s) Oral daily  metoprolol succinate  milliGRAM(s) Oral daily  sacubitril 24 mG/valsartan 26 mG 1 Tablet(s) Oral two times a day  ticagrelor 90 milliGRAM(s) Oral every 12 hours       Patient is a 77y old  Male who presents with a chief complaint of STEMI (30 Oct 2023 14:51)    A1c 6.4% pre-diabetes. diabetes education provided- complications, diet/exercise. Patient states follows balanced diet- exercise 3x week.     HPI:  77-year-old male with history of HTN, HLD as in his baseline state of good health up until approximately 30 to 45 minutes prior to arrival here when while at rest developed anterior substernal nonradiating chest pressure/heaviness that was persisting with slight shortness of breath and slight sweating. On presentation to the ED, the  EKG was significant for inferior ST elevation with subtle ST depressions in V1 and V2. Patient has been transferred to ICU post catherization. He currently denies any fever, HA, cp, sob, abd pain, N/V/D/C. Pt states chest pain has improved and now feels like chest soreness.    ED course:   Vitals : T 97.7 /77, RR 25, SpO2 95% RA  HR 84  Labs: troponin 96.2, WBC 11.50  EKG:  inferior ST elevation with subtle ST depressions in V1 and V2.          (28 Oct 2023 17:11)      PAST MEDICAL & SURGICAL HISTORY:  Hypothyroid      Colitis      History of left hip replacement          Allergies    penicillin (Swelling)    Intolerances        MEDICATIONS  (STANDING):  atorvastatin 80 milliGRAM(s) Oral at bedtime  chlorhexidine 2% Cloths 1 Application(s) Topical <User Schedule>  heparin   Injectable 5000 Unit(s) SubCutaneous every 8 hours  levothyroxine 88 MICROGram(s) Oral daily  metoprolol succinate  milliGRAM(s) Oral daily  sacubitril 24 mG/valsartan 26 mG 1 Tablet(s) Oral two times a day  ticagrelor 90 milliGRAM(s) Oral every 12 hours

## 2023-10-30 NOTE — DISCHARGE NOTE PROVIDER - PROVIDER TOKENS
PROVIDER:[TOKEN:[[id number]],FOLLOWUP:[2 weeks]],FREE:[LAST:[Boutis],FIRST:[Vanessa],PHONE:[phone]],FAX:[(   )    -],ADDRESS:[[address] [place] [address]],SCHEDULEDAPPT:[11/03/2023],SCHEDULEDAPPTTIME:[10:15 AM]]

## 2023-10-30 NOTE — DISCHARGE NOTE PROVIDER - NSDCFUSCHEDAPPT_GEN_ALL_CORE_FT
Vanessa Redding  [facility] Physician [facility], [facility]  CARDIOLOGY [address]  Scheduled Appointment: 11/03/2023

## 2023-10-30 NOTE — DISCHARGE NOTE PROVIDER - NSDCFUADDINST_GEN_ALL_CORE_FT
Wound Care:   the day AFTER your procedure...     Remove the bandage from the site and gently clean with soap and water then pat dry; leave open to air.     You may take a brief shower     Do NOT apply lotions, creams, powders, ointments, or perfumes to your incision site unless prescribed by your physician     Do NOT soak your procedure site for 1 week (no baths, no pools, no tubs, etc...)     Check  your groin and /or wrist daily. A small amount of bruising, and soreness are normal    ACTIVITY: for 24 hours      - DO NOT DRIVE     - DO NOT make any important decisions or sign legal documents      - DO NOT operate heavy machinery      - you may resume sexual activity in 48 hours, unless otherwise instructed by your cardiologist          If your procedure was done through the WRIST: for the NEXT 3 DAYS:          - avoid pushing, pulling, with that affected wrist (such as pushing up from a seated position)          - avoid repeated movement of that hand and wrist (such as typing or hammering)          - DO NOT LIFT anything more than 5 pounds         If your procedure was done through the GROIN: for the NEXT 5 DAYS          - Limit climbing stairs, DO NOT soak in bathtub or pool          - no strenous activities, pushing, pulling, straining          - Do not lift anything more than 10 pounds     MEDICATION:      Please take your medications as explained to you (found on your discharge paperwork)      If you received a stent, you will be taking medication to KEEP YOUR STENT OPEN. Refer to the "keep your stent open sheet"           You MUST start taking this medication immediately.           Take this medication as prescribed and uninterrupted.            DO NOT STOP taking them for any reason without consulting with your cardiologist first.      **if you have diabetes and take metformin please do not take this medication for 2 days after the procedure. Restart and take as usual starting day 3.    Follow the heart healthy diet recommended by your doctor.   Drink plenty of water for the next 24 hours unless otherwise instructed.  Do not drink any alcoholic beverages for 24 hours (beer, wine, liquor, etc).    If you smoke: STOP SMOKING. Call the [facility] at [phone] for assistance.    CALL your cardiologist/primary care doctor to make a follow-up appointment in 2 WEEKS     **CALL YOUR DOCTOR if you experience     fever, chills, body aches, or severe pain, swelling, redness, heat or yellow discharge at incision site     bleeding or excruciating pain at the procedural site, swelling (golf ball size) at your procedural site     CHEST PAIN     numbness, tingling, temperature change (of your procedural site)     Pain  -you may have pain after your surgery or procedure at the puncture site or in the artery/vein that has been treated.  -take pain medication as directed by your doctor.  call your doctor if your pain is not getting better within 5 days or if it gets worse  -prescription pain medication should be taken with food, and can cause constipation, an over-the-counter softener may be helpful    Nausea  -anesthesia/sedation can upset your stomach  -eat bland foods (Jell-o, crackers, toast) and drink ginger ale if you are nauseated  -drink plenty of fluids such as water or ginger ale (unless instructed otherwise by your doctor)  -if you have nausea or vomiting the day after your procedure, call your doctor    Bleeding  -you may have a small amount of oozing from your surgical or procedural site  -bleeding as the site can be dangerous and should prompt immediate medical attention    Infection  -if you have any of the following signs of infection, call your doctor:       redness, swelling, fever over 101 degrees, thick yellow/white drainage    If you are unable to reach your doctor, you may contact:   Dr Vanessa Redding @ [phone]    **Call 911 immediately if:     - your hand or leg becomes blue, feels cold to touch, or if you have numbness or tingling     - bleeding or swelling from your wrist or groin site cannot be controlled or if area becomes very red or hot to touch     - you have pain, pressure, tightness or burning in your chest, arms, jaw or stomach; shortness of breath; nausea or excessive sweating; lightheadedness; dizziness or a fainting spell; or if you have sudden back or stomach pain     -you have rapid heartbeat or palpitations     - you have bright red blood in large amounts, severe pain at access site (wrist or groin) or significant new swelling at the puncture site

## 2023-10-30 NOTE — DISCHARGE NOTE PROVIDER - HOSPITAL COURSE
77 year old male with PMH hypothyroidism, HTN, HLD, ulcerative colitis.     10/28 presented to [place] ED with SSCP. Admitted IW STEMI. Emergent LHC.    < from: Cardiac Catheterization (10.28.23 @ 15:55) >  Cath Lab Report    Diagnostic Cardiologist:       Vanessa Redding MD   Interventional Cardiologist:   Vanessa Redding MD   Procedures Performed                1.    Arterial Access - Right Radial   2.    Left Heart Cath   3.    Diagnostic Coronary Angiography   4.    PCI: DWAINE   5.    IVUS   Indications:                Myocardial infarction with ST elevation (STEMI)  Conclusions:        Acute STEMI presenting to [facility].        ECG suggesting deepthi-lateral STEMI with inferior involvement as well (the wrap-around portion of the apical LAD).         EF 40%.        Culprit in the prox and mid LAD.       s/p 3 DWAINE. IVUS used to guide post-dilatation balloon.       Additional severe disease noted in the Lcx.    Recommendations:        DAPT for 1 year.        Aggressive lipid therapy.        Trend CE.        Echocardiogram.       Start beta blocker.        Staged PCI to the Lcx prior to d/c.  Acute complication:    No complications   < end of copied text >    10/29 asymptomatic NSVT, metoprolol up titrated.   < from: TTE W or WO Ultrasound Enhancing Agent (10.29.23 @ 08:40) >  CONCLUSIONS:   1. Technically difficult image quality.   2. Left ventricular systolic function is mildly decreased with an ejection fraction visually estimated at 40 to 45 %. Regional wall motion abnormalities present.   3. Apical septal segment, apex, and mid inferoseptal segment are abnormal.   4. There is mild (grade 1) left ventricular diastolic dysfunction.   5. Normal right ventricular cavity size, wall thickness, and systolic function.   6. Trileaflet aortic valve with normal systolic excursion. calcification of the aortic valve leaflets.   7. Trace mitral regurgitation.   8. Trace tricuspid regurgitation.   9. Trace pulmonic regurgitation.  10. The inferior vena cava is normal in size measuring 1.75 cm in diameter, (normal <2.1cm) with abnormal inspiratory collapse (abnormal <50%) consistent with mildly elevated right atrial pressure (~8, range 5-10mmHg).  < end of copied text >    10/30 < from: Cardiac Catheterization (10.30.23 @ 09:02) >  Cath Lab Report    Interventional Cardiologist:       Vanessa Redding MD   Referring Physician:     Rosio Dubois,    Procedures Performed   1.   Arterial Access - Right Radial   2.   PCI: DWAINE   3.   IVUS   Indications:           Staged Procedures   Conclusions: Successful staged PCI to the Lcx with two DWAINE   Recommendations: DAPT for 9 mo. Aggressive lipid therapy.    Acute complications: No complications   < end of copied text >    10/31 d/c home? 77 year old male with PMH hypothyroidism, HTN, HLD, ulcerative colitis.     10/28 presented to [place] ED with SSCP. Admitted IW STEMI. Emergent LHC.    < from: Cardiac Catheterization (10.28.23 @ 15:55) >  Cath Lab Report    Diagnostic Cardiologist:       Vanessa Redding MD   Interventional Cardiologist:   Vanessa Redding MD   Procedures Performed                1.    Arterial Access - Right Radial   2.    Left Heart Cath   3.    Diagnostic Coronary Angiography   4.    PCI: DWAINE   5.    IVUS   Indications:                Myocardial infarction with ST elevation (STEMI)  Conclusions:        Acute STEMI presenting to [facility].        ECG suggesting deepthi-lateral STEMI with inferior involvement as well (the wrap-around portion of the apical LAD).         EF 40%.        Culprit in the prox and mid LAD.       s/p 3 DWAINE. IVUS used to guide post-dilatation balloon.       Additional severe disease noted in the Lcx.    Recommendations:        DAPT for 1 year.        Aggressive lipid therapy.        Trend CE.        Echocardiogram.       Start beta blocker.        Staged PCI to the Lcx prior to d/c.  Acute complication:    No complications   < end of copied text >    10/29 asymptomatic NSVT, metoprolol up titrated.   < from: TTE W or WO Ultrasound Enhancing Agent (10.29.23 @ 08:40) >  CONCLUSIONS:   1. Technically difficult image quality.   2. Left ventricular systolic function is mildly decreased with an ejection fraction visually estimated at 40 to 45 %. Regional wall motion abnormalities present.   3. Apical septal segment, apex, and mid inferoseptal segment are abnormal.   4. There is mild (grade 1) left ventricular diastolic dysfunction.   5. Normal right ventricular cavity size, wall thickness, and systolic function.   6. Trileaflet aortic valve with normal systolic excursion. calcification of the aortic valve leaflets.   7. Trace mitral regurgitation.   8. Trace tricuspid regurgitation.   9. Trace pulmonic regurgitation.  10. The inferior vena cava is normal in size measuring 1.75 cm in diameter, (normal <2.1cm) with abnormal inspiratory collapse (abnormal <50%) consistent with mildly elevated right atrial pressure (~8, range 5-10mmHg).  < end of copied text >    10/30 < from: Cardiac Catheterization (10.30.23 @ 09:02) >  Cath Lab Report    Interventional Cardiologist:       Vanessa Redding MD   Referring Physician:     Rosio Dubois,    Procedures Performed   1.   Arterial Access - Right Radial   2.   PCI: DWAINE   3.   IVUS   Indications:           Staged Procedures   Conclusions: Successful staged PCI to the Lcx with two DWAINE   Recommendations: DAPT for 9 mo. Aggressive lipid therapy.    Acute complications: No complications   < end of copied text >    10/31 d/c home

## 2023-10-30 NOTE — PROGRESS NOTE ADULT - SUBJECTIVE AND OBJECTIVE BOX
[facility] - Jenni Wilkins, Trent Aragon Savella, [place]  Office Number:  [phone]    Patient resting comfortably in bed in NAD.  Laying flat with no respiratory distress.  No complaints of chest pain, dyspnea, palpitations, PND, or orthopnea.  Planned for staged PCI today    ROS: negative unless otherwise mentioned.    Telemetry: NSVT    MEDICATIONS  (STANDING):  atorvastatin 80 milliGRAM(s) Oral at bedtime  chlorhexidine 2% Cloths 1 Application(s) Topical <User Schedule>  levothyroxine 88 MICROGram(s) Oral daily  metoprolol tartrate 50 milliGRAM(s) Oral two times a day  ticagrelor 90 milliGRAM(s) Oral every 12 hours    MEDICATIONS  (PRN):  zolpidem 5 milliGRAM(s) Oral at bedtime PRN Insomnia      Allergies    penicillin (Swelling)    Intolerances        Vital Signs Last 24 Hrs  T(C): 36.7 (30 Oct 2023 04:00), Max: 36.7 (30 Oct 2023 04:00)  T(F): 98 (30 Oct 2023 04:00), Max: 98 (30 Oct 2023 04:00)  HR: 69 (30 Oct 2023 08:00) (64 - 80)  BP: 166/90 (30 Oct 2023 08:00) (118/70 - 166/90)  BP(mean): 120 (30 Oct 2023 08:00) (86 - 120)  RR: 28 (30 Oct 2023 08:00) (12 - 34)  SpO2: 95% (30 Oct 2023 08:00) (92% - 96%)    Parameters below as of 29 Oct 2023 19:00  Patient On (Oxygen Delivery Method): room air        I&O's Summary    29 Oct 2023 07:01  -  30 Oct 2023 07:00  --------------------------------------------------------  IN: 350 mL / OUT: 1800 mL / NET: -1450 mL        ON EXAM:    General: NAD, awake and alert, oriented x 3  HEENT: Mucous membranes are moist, anicteric  Lungs: Non-labored, breath sounds are clear bilaterally, No wheezing, rales or rhonchi  Cardiovascular: Regular, S1 and S2, no murmurs, rubs, or gallops  Gastrointestinal: Bowel Sounds present, soft, nontender.   Lymph: No peripheral edema. No lymphadenopathy.  Skin: No rashes or ulcers  Psych:  Mood & affect appropriate    LABS: All Labs Reviewed:                        15.3   10.63 )-----------( 232      ( 30 Oct 2023 06:10 )             47.1                         15.2   11.83 )-----------( 220      ( 29 Oct 2023 08:23 )             46.5                         15.4   11.50 )-----------( 259      ( 28 Oct 2023 15:30 )             48.0     30 Oct 2023 06:10    137    |  103    |  15     ----------------------------<  113    3.8     |  28     |  0.87   29 Oct 2023 19:30    139    |  103    |  16     ----------------------------<  120    4.3     |  29     |  1.10   29 Oct 2023 08:23    135    |  102    |  16     ----------------------------<  142    3.9     |  27     |  0.85     Ca    9.0        30 Oct 2023 06:10  Ca    9.2        29 Oct 2023 19:30  Ca    9.1        29 Oct 2023 08:23  Phos  2.6       30 Oct 2023 06:10  Phos  3.1       29 Oct 2023 19:30  Phos  2.1       29 Oct 2023 08:23  Mg     2.0       30 Oct 2023 06:10  Mg     2.3       29 Oct 2023 19:30  Mg     2.0       29 Oct 2023 08:23    TPro  7.5    /  Alb  3.8    /  TBili  0.8    /  DBili  x      /  AST  64     /  ALT  31     /  AlkPhos  65     29 Oct 2023 08:23  TPro  7.7    /  Alb  4.1    /  TBili  0.5    /  DBili  x      /  AST  18     /  ALT  28     /  AlkPhos  59     28 Oct 2023 15:30    PT/INR - ( 28 Oct 2023 15:30 )   PT: 11.9 sec;   INR: 1.02 ratio         PTT - ( 28 Oct 2023 15:30 )  PTT:33.4 sec  CARDIAC MARKERS ( 29 Oct 2023 19:30 )  x     / x     / 453 U/L / x     / x          Blood Culture:     10-28 @ 18:11  TSH: 0.41    TTE:  CONCLUSIONS:  1. Technically difficult image quality.  2. Left ventricular systolic function is mildly decreased with an ejection fraction visually estimated at 40   to 45 %. Regional wall motion abnormalities present.  3. Apical septal segment, apex, and mid inferoseptal segment are abnormal.  4. There is mild (grade 1) left ventricular diastolic dysfunction.  5. Normal right ventricular cavity size, wall thickness, and systolic function.  6. Trileaflet aortic valve with normal systolic excursion. calcification of the aortic valve leaflets.  7. Trace mitral regurgitation.  8. Trace tricuspid regurgitation.  9. Trace pulmonic regurgitation.  10. The inferior vena cava is normal in size measuring 1.75 cm in diameter, (normal <2.1cm) with   abnormal inspiratory collapse (abnormal <50%) consistent with mildly elevated right atrial pressure   (~8, range 5-10mmHg)

## 2023-10-31 ENCOUNTER — TRANSCRIPTION ENCOUNTER (OUTPATIENT)
Age: 78
End: 2023-10-31

## 2023-10-31 VITALS — TEMPERATURE: 98 F

## 2023-10-31 DIAGNOSIS — R73.03 PREDIABETES: ICD-10-CM

## 2023-10-31 LAB
ALBUMIN SERPL ELPH-MCNC: 3.7 G/DL — SIGNIFICANT CHANGE UP (ref 3.3–5)
ALBUMIN SERPL ELPH-MCNC: 3.7 G/DL — SIGNIFICANT CHANGE UP (ref 3.3–5)
ALP SERPL-CCNC: 66 U/L — SIGNIFICANT CHANGE UP (ref 40–120)
ALP SERPL-CCNC: 66 U/L — SIGNIFICANT CHANGE UP (ref 40–120)
ALT FLD-CCNC: 33 U/L — SIGNIFICANT CHANGE UP (ref 12–78)
ALT FLD-CCNC: 33 U/L — SIGNIFICANT CHANGE UP (ref 12–78)
ANION GAP SERPL CALC-SCNC: 8 MMOL/L — SIGNIFICANT CHANGE UP (ref 5–17)
ANION GAP SERPL CALC-SCNC: 8 MMOL/L — SIGNIFICANT CHANGE UP (ref 5–17)
APTT BLD: 37.4 SEC — HIGH (ref 24.5–35.6)
APTT BLD: 37.4 SEC — HIGH (ref 24.5–35.6)
AST SERPL-CCNC: 39 U/L — HIGH (ref 15–37)
AST SERPL-CCNC: 39 U/L — HIGH (ref 15–37)
BILIRUB SERPL-MCNC: 0.8 MG/DL — SIGNIFICANT CHANGE UP (ref 0.2–1.2)
BILIRUB SERPL-MCNC: 0.8 MG/DL — SIGNIFICANT CHANGE UP (ref 0.2–1.2)
BUN SERPL-MCNC: 17 MG/DL — SIGNIFICANT CHANGE UP (ref 7–23)
BUN SERPL-MCNC: 17 MG/DL — SIGNIFICANT CHANGE UP (ref 7–23)
CALCIUM SERPL-MCNC: 9.2 MG/DL — SIGNIFICANT CHANGE UP (ref 8.5–10.1)
CALCIUM SERPL-MCNC: 9.2 MG/DL — SIGNIFICANT CHANGE UP (ref 8.5–10.1)
CHLORIDE SERPL-SCNC: 104 MMOL/L — SIGNIFICANT CHANGE UP (ref 96–108)
CHLORIDE SERPL-SCNC: 104 MMOL/L — SIGNIFICANT CHANGE UP (ref 96–108)
CO2 SERPL-SCNC: 25 MMOL/L — SIGNIFICANT CHANGE UP (ref 22–31)
CO2 SERPL-SCNC: 25 MMOL/L — SIGNIFICANT CHANGE UP (ref 22–31)
CREAT SERPL-MCNC: 0.87 MG/DL — SIGNIFICANT CHANGE UP (ref 0.5–1.3)
CREAT SERPL-MCNC: 0.87 MG/DL — SIGNIFICANT CHANGE UP (ref 0.5–1.3)
EGFR: 90 ML/MIN/1.73M2 — SIGNIFICANT CHANGE UP
EGFR: 90 ML/MIN/1.73M2 — SIGNIFICANT CHANGE UP
GLUCOSE SERPL-MCNC: 113 MG/DL — HIGH (ref 70–99)
GLUCOSE SERPL-MCNC: 113 MG/DL — HIGH (ref 70–99)
HCT VFR BLD CALC: 48.6 % — SIGNIFICANT CHANGE UP (ref 39–50)
HCT VFR BLD CALC: 48.6 % — SIGNIFICANT CHANGE UP (ref 39–50)
HGB BLD-MCNC: 16.3 G/DL — SIGNIFICANT CHANGE UP (ref 13–17)
HGB BLD-MCNC: 16.3 G/DL — SIGNIFICANT CHANGE UP (ref 13–17)
INR BLD: 1.04 RATIO — SIGNIFICANT CHANGE UP (ref 0.85–1.18)
INR BLD: 1.04 RATIO — SIGNIFICANT CHANGE UP (ref 0.85–1.18)
MAGNESIUM SERPL-MCNC: 1.9 MG/DL — SIGNIFICANT CHANGE UP (ref 1.6–2.6)
MAGNESIUM SERPL-MCNC: 1.9 MG/DL — SIGNIFICANT CHANGE UP (ref 1.6–2.6)
MCHC RBC-ENTMCNC: 28 PG — SIGNIFICANT CHANGE UP (ref 27–34)
MCHC RBC-ENTMCNC: 28 PG — SIGNIFICANT CHANGE UP (ref 27–34)
MCHC RBC-ENTMCNC: 33.5 GM/DL — SIGNIFICANT CHANGE UP (ref 32–36)
MCHC RBC-ENTMCNC: 33.5 GM/DL — SIGNIFICANT CHANGE UP (ref 32–36)
MCV RBC AUTO: 83.5 FL — SIGNIFICANT CHANGE UP (ref 80–100)
MCV RBC AUTO: 83.5 FL — SIGNIFICANT CHANGE UP (ref 80–100)
NRBC # BLD: 0 /100 WBCS — SIGNIFICANT CHANGE UP (ref 0–0)
NRBC # BLD: 0 /100 WBCS — SIGNIFICANT CHANGE UP (ref 0–0)
PLATELET # BLD AUTO: 254 K/UL — SIGNIFICANT CHANGE UP (ref 150–400)
PLATELET # BLD AUTO: 254 K/UL — SIGNIFICANT CHANGE UP (ref 150–400)
POTASSIUM SERPL-MCNC: 4.4 MMOL/L — SIGNIFICANT CHANGE UP (ref 3.5–5.3)
POTASSIUM SERPL-MCNC: 4.4 MMOL/L — SIGNIFICANT CHANGE UP (ref 3.5–5.3)
POTASSIUM SERPL-SCNC: 4.4 MMOL/L — SIGNIFICANT CHANGE UP (ref 3.5–5.3)
POTASSIUM SERPL-SCNC: 4.4 MMOL/L — SIGNIFICANT CHANGE UP (ref 3.5–5.3)
PROT SERPL-MCNC: 7.6 G/DL — SIGNIFICANT CHANGE UP (ref 6–8.3)
PROT SERPL-MCNC: 7.6 G/DL — SIGNIFICANT CHANGE UP (ref 6–8.3)
PROTHROM AB SERPL-ACNC: 12.1 SEC — SIGNIFICANT CHANGE UP (ref 9.5–13)
PROTHROM AB SERPL-ACNC: 12.1 SEC — SIGNIFICANT CHANGE UP (ref 9.5–13)
RBC # BLD: 5.82 M/UL — HIGH (ref 4.2–5.8)
RBC # BLD: 5.82 M/UL — HIGH (ref 4.2–5.8)
RBC # FLD: 13 % — SIGNIFICANT CHANGE UP (ref 10.3–14.5)
RBC # FLD: 13 % — SIGNIFICANT CHANGE UP (ref 10.3–14.5)
SODIUM SERPL-SCNC: 137 MMOL/L — SIGNIFICANT CHANGE UP (ref 135–145)
SODIUM SERPL-SCNC: 137 MMOL/L — SIGNIFICANT CHANGE UP (ref 135–145)
WBC # BLD: 11.49 K/UL — HIGH (ref 3.8–10.5)
WBC # BLD: 11.49 K/UL — HIGH (ref 3.8–10.5)
WBC # FLD AUTO: 11.49 K/UL — HIGH (ref 3.8–10.5)
WBC # FLD AUTO: 11.49 K/UL — HIGH (ref 3.8–10.5)

## 2023-10-31 PROCEDURE — 93010 ELECTROCARDIOGRAM REPORT: CPT

## 2023-10-31 PROCEDURE — 99233 SBSQ HOSP IP/OBS HIGH 50: CPT

## 2023-10-31 PROCEDURE — 99238 HOSP IP/OBS DSCHRG MGMT 30/<: CPT | Mod: GC

## 2023-10-31 PROCEDURE — 99221 1ST HOSP IP/OBS SF/LOW 40: CPT

## 2023-10-31 RX ORDER — METOPROLOL TARTRATE 50 MG
1 TABLET ORAL
Qty: 90 | Refills: 3
Start: 2023-10-31 | End: 2024-10-24

## 2023-10-31 RX ORDER — METOPROLOL TARTRATE 50 MG
1 TABLET ORAL
Qty: 30 | Refills: 0
Start: 2023-10-31 | End: 2023-11-29

## 2023-10-31 RX ORDER — ZOLPIDEM TARTRATE 10 MG/1
1 TABLET ORAL
Qty: 0 | Refills: 0 | DISCHARGE
Start: 2023-10-31

## 2023-10-31 RX ORDER — TICAGRELOR 90 MG/1
1 TABLET ORAL
Qty: 180 | Refills: 3
Start: 2023-10-31 | End: 2024-10-24

## 2023-10-31 RX ORDER — SACUBITRIL AND VALSARTAN 24; 26 MG/1; MG/1
1 TABLET, FILM COATED ORAL
Qty: 90 | Refills: 3
Start: 2023-10-31 | End: 2024-10-24

## 2023-10-31 RX ORDER — ASPIRIN/CALCIUM CARB/MAGNESIUM 324 MG
1 TABLET ORAL
Qty: 90 | Refills: 3
Start: 2023-10-31 | End: 2024-10-24

## 2023-10-31 RX ORDER — LEVOTHYROXINE SODIUM 125 MCG
1 TABLET ORAL
Refills: 0 | DISCHARGE
Start: 2023-10-31

## 2023-10-31 RX ORDER — ATORVASTATIN CALCIUM 80 MG/1
1 TABLET, FILM COATED ORAL
Qty: 30 | Refills: 0
Start: 2023-10-31

## 2023-10-31 RX ORDER — ATORVASTATIN CALCIUM 80 MG/1
1 TABLET, FILM COATED ORAL
Qty: 90 | Refills: 3
Start: 2023-10-31 | End: 2024-10-24

## 2023-10-31 RX ORDER — LEVOTHYROXINE SODIUM 125 MCG
1 TABLET ORAL
Refills: 0 | DISCHARGE

## 2023-10-31 RX ORDER — METOPROLOL TARTRATE 50 MG
1 TABLET ORAL
Refills: 0 | DISCHARGE

## 2023-10-31 RX ORDER — LEVOTHYROXINE SODIUM 125 MCG
1 TABLET ORAL
Qty: 30 | Refills: 0
Start: 2023-10-31 | End: 2023-11-29

## 2023-10-31 RX ORDER — ASPIRIN/CALCIUM CARB/MAGNESIUM 324 MG
1 TABLET ORAL
Qty: 0 | Refills: 0 | DISCHARGE
Start: 2023-10-31

## 2023-10-31 RX ORDER — TICAGRELOR 90 MG/1
1 TABLET ORAL
Qty: 60 | Refills: 0
Start: 2023-10-31 | End: 2023-11-29

## 2023-10-31 RX ORDER — SIMVASTATIN 20 MG/1
1 TABLET, FILM COATED ORAL
Refills: 0 | DISCHARGE

## 2023-10-31 RX ORDER — MAGNESIUM SULFATE 500 MG/ML
1 VIAL (ML) INJECTION ONCE
Refills: 0 | Status: COMPLETED | OUTPATIENT
Start: 2023-10-31 | End: 2023-10-31

## 2023-10-31 RX ORDER — METOPROLOL TARTRATE 100 MG/1
1 TABLET, FILM COATED ORAL
Qty: 30 | Refills: 0 | DISCHARGE
Start: 2023-10-31 | End: 2023-11-29

## 2023-10-31 RX ADMIN — Medication 88 MICROGRAM(S): at 05:32

## 2023-10-31 RX ADMIN — Medication 100 MILLIGRAM(S): at 05:31

## 2023-10-31 RX ADMIN — TICAGRELOR 90 MILLIGRAM(S): 90 TABLET ORAL at 05:32

## 2023-10-31 RX ADMIN — HEPARIN SODIUM 5000 UNIT(S): 5000 INJECTION INTRAVENOUS; SUBCUTANEOUS at 05:32

## 2023-10-31 RX ADMIN — Medication 100 GRAM(S): at 08:02

## 2023-10-31 RX ADMIN — Medication 81 MILLIGRAM(S): at 05:36

## 2023-10-31 RX ADMIN — CHLORHEXIDINE GLUCONATE 1 APPLICATION(S): 213 SOLUTION TOPICAL at 05:35

## 2023-10-31 RX ADMIN — SACUBITRIL AND VALSARTAN 1 TABLET(S): 24; 26 TABLET, FILM COATED ORAL at 05:33

## 2023-10-31 NOTE — PROGRESS NOTE ADULT - SUBJECTIVE AND OBJECTIVE BOX
Department of Cardiology                                                                     Division of Interventional Cardiology                                                                  [facility]/ [place] [address]                                                                             Telephone: [phone]                                                    Post- Procedure Note: Left Heart Cardiac Catheterization       77y Male p/w CP. "Code STEMI". Now s/p left heart catheterization with DWAINE x 3 to prox & mid LAD on 10/28 and DWAINE x 2 to Circ on 10/30    Subjective/ROS: no c/o offered. Denies CP, palpitations, SOB, N/V, fever/chills, dizziness, weakness, numbness/tingling.      HPI:  77-year-old male with history of HTN, HLD as in his baseline state of good health up until approximately 30 to 45 minutes prior to arrival here when while at rest developed anterior substernal nonradiating chest pressure/heaviness that was persisting with slight shortness of breath and slight sweating. On presentation to the ED, the  EKG was significant for inferior ST elevation with subtle ST depressions in V1 and V2. Patient has been transferred to ICU post catherization. He currently denies any fever, HA, cp, sob, abd pain, N/V/D/C. Pt states chest pain has improved and now feels like chest soreness.    ED course:   Vitals : T 97.7 /77, RR 25, SpO2 95% RA  HR 84  Labs: troponin 96.2, WBC 11.50  EKG:  inferior ST elevation with subtle ST depressions in V1 and V2.  (28 Oct 2023 17:11)      PAST MEDICAL & SURGICAL HISTORY:  Hypothyroid  Colitis  History of left hip replacement      MEDICATIONS  (STANDING):  aspirin enteric coated 81 milliGRAM(s) Oral <User Schedule>  atorvastatin 80 milliGRAM(s) Oral at bedtime  chlorhexidine 2% Cloths 1 Application(s) Topical <User Schedule>  heparin   Injectable 5000 Unit(s) SubCutaneous every 8 hours  levothyroxine 88 MICROGram(s) Oral daily  metoprolol succinate  milliGRAM(s) Oral daily  sacubitril 24 mG/valsartan 26 mG 1 Tablet(s) Oral two times a day  ticagrelor 90 milliGRAM(s) Oral every 12 hours    MEDICATIONS  (PRN):  acetaminophen     Tablet .. 650 milliGRAM(s) Oral every 6 hours PRN Mild Pain (1 - 3)  zolpidem 5 milliGRAM(s) Oral at bedtime PRN Insomnia    Allergies: penicillin (Swelling)                          16.3   11.49 )-----------( 254      ( 31 Oct 2023 05:53 )             48.6     10-31    137  |  104  |  17  ----------------------------<  113<H>  4.4   |  25  |  0.87    Ca    9.2      31 Oct 2023 05:53  Phos  2.6     10-30  Mg     1.9     10-31  TPro  7.6  /  Alb  3.7  /  TBili  0.8  /  DBili  x   /  AST  39<H>  /  ALT  33  /  AlkPhos  66  10-31    PT/INR - ( 31 Oct 2023 05:53 )   PT: 12.1 sec;   INR: 1.04 ratio      PTT - ( 31 Oct 2023 05:53 )  PTT:37.4 sec      Vital Signs Last 24 Hrs  T(C): 36.4 (31 Oct 2023 08:07), Max: 36.7 (30 Oct 2023 16:21)  T(F): 97.6 (31 Oct 2023 08:07), Max: 98 (30 Oct 2023 16:21)  HR: 74 (31 Oct 2023 08:00) (65 - 128)  Tele: NSR, no further NSVT  BP: 106/65 (31 Oct 2023 08:00) (102/64 - 178/99)  BP(mean): 81 (31 Oct 2023 08:00) (74 - 133)  RR: 17 (31 Oct 2023 08:00) (14 - 44)  SpO2: 96% (31 Oct 2023 08:00) (93% - 97%) on room air      < from: 12 Lead ECG (10.30.23 @ 10:25) >  Ventricular Rate 65 BPM  Atrial Rate 65 BPM  P-R Interval 146 ms  QRS Duration 88 ms  Q-T Interval 424 ms  QTC Calculation(Bazett) 440 ms  P Axis 34 degrees  R Axis 12 degrees  T Axis -19 degrees  Diagnosis Line *** Critical Test Result: STEMI  Normal sinus rhythm  Inferior infarct (cited on or before 30-OCT-2023)  Anterior infarct (cited on or before 30-OCT-2023)  Lateral injury pattern  ** ** ACUTE MI / STEMI ** **  Abnormal ECG  < end of copied text >    10/31/23 ECG (my read): NSR, still with biphasic TW and without significant interval change from prior ECG      Constitutional: NAD  Neuro: A+O x 3, non-focal, speech clear and intact  HEENT: NC/AT, PERRL, EOMI, anicteric sclerae, oral mucosa pink and moist  Neck: supple, no JVD  CV: regular rate, regular rhythm, +S1S2, no murmurs or rub  Pulm/chest: lung sounds CTA and equal bilaterally, no accessory muscle use noted  Abd: soft, NT, ND, +BS  Ext: POLO x 4, no C/C/E  Access site: R wrist C/D/I/soft without hematoma, distal motor/neuro/circ intact. R radial pulse 2+.  Skin: warm, well perfused  Psych: calm, appropriate affect

## 2023-10-31 NOTE — PHARMACOTHERAPY INTERVENTION NOTE - COMMENTS
Prescriptions filled at [facility].    Brought to bedside and counseled.  [facility] name: Jan Stephens, Pharm. D.  Counseling materials provided/counseling aids used: Info button, med bottles  Time spent Counselin minutes  Counseling provided according to ArnoldP guidelines including side effects  Notes: follow up rxs sent to home [email or web address].

## 2023-10-31 NOTE — PROGRESS NOTE ADULT - SUBJECTIVE AND OBJECTIVE BOX
Interval Events: Patient seen and examined at bedside. No overnight events.    Review of Systems:  Constitutional: No fever, chills, fatigue  Neuro: No headache, numbness, weakness  Resp: No cough, wheezing, shortness of breath  CVS: No chest pain, palpitations, leg swelling  GI: No abdominal pain, nausea, vomiting, diarrhea   : No dysuria, frequency, incontinence  Skin: No itching, burning, rashes, or lesions   Msk: No joint pain or swelling  Psych: No depression, anxiety, mood swings    ICU Vital Signs Last 24 Hrs  T(C): 36.4 (31 Oct 2023 08:07), Max: 36.7 (30 Oct 2023 16:21)  T(F): 97.6 (31 Oct 2023 08:07), Max: 98 (30 Oct 2023 16:21)  HR: 74 (31 Oct 2023 08:00) (65 - 128)  BP: 106/65 (31 Oct 2023 08:00) (102/64 - 178/99)  BP(mean): 81 (31 Oct 2023 08:00) (74 - 133)  ABP: --  ABP(mean): --  RR: 17 (31 Oct 2023 08:00) (14 - 44)  SpO2: 96% (31 Oct 2023 08:00) (93% - 97%)    O2 Parameters below as of 31 Oct 2023 08:00  Patient On (Oxygen Delivery Method): room air              10-30-23 @ 07:01  -  10-31-23 @ 07:00  --------------------------------------------------------  IN: 475 mL / OUT: 1100 mL / NET: -625 mL    10-31-23 @ 07:01  -  10-31-23 @ 08:53  --------------------------------------------------------  IN: 225 mL / OUT: 0 mL / NET: 225 mL        CAPILLARY BLOOD GLUCOSE          I&O's Summary    30 Oct 2023 07:01  -  31 Oct 2023 07:00  --------------------------------------------------------  IN: 475 mL / OUT: 1100 mL / NET: -625 mL    31 Oct 2023 07:01  -  31 Oct 2023 08:53  --------------------------------------------------------  IN: 225 mL / OUT: 0 mL / NET: 225 mL        Physical Exam:   Gen:   Neuro:   HEENT:  Resp:  CVS:  Abd:  Ext:  Skin:     Meds:    metoprolol succinate ER Oral  sacubitril 24 mG/valsartan 26 mG Oral    atorvastatin Oral  levothyroxine Oral      acetaminophen     Tablet .. Oral PRN  zolpidem Oral PRN      aspirin enteric coated Oral  heparin   Injectable SubCutaneous  ticagrelor Oral            chlorhexidine 2% Cloths Topical                              16.3   11.49 )-----------( 254      ( 31 Oct 2023 05:53 )             48.6       10-31    137  |  104  |  17  ----------------------------<  113<H>  4.4   |  25  |  0.87    Ca    9.2      31 Oct 2023 05:53  Phos  2.6     10-30  Mg     1.9     10-31    TPro  7.6  /  Alb  3.7  /  TBili  0.8  /  DBili  x   /  AST  39<H>  /  ALT  33  /  AlkPhos  66  10-31      CARDIAC MARKERS ( 29 Oct 2023 19:30 )  x     / x     / 453 U/L / x     / x          PT/INR - ( 31 Oct 2023 05:53 )   PT: 12.1 sec;   INR: 1.04 ratio         PTT - ( 31 Oct 2023 05:53 )  PTT:37.4 sec  Urinalysis Basic - ( 31 Oct 2023 05:53 )    Color: x / Appearance: x / SG: x / pH: x  Gluc: 113 mg/dL / Ketone: x  / Bili: x / Urobili: x   Blood: x / Protein: x / Nitrite: x   Leuk Esterase: x / RBC: x / WBC x   Sq Epi: x / Non Sq Epi: x / Bacteria: x                  Radiology:    Bedside Ultrasound:    Tubes/Lines:      GLOBAL ISSUE/BEST PRACTICE:  Analgesia:  Sedation:  HOB elevation: Y  Stress ulcer prophylaxis:  VTE prophylaxis:  Glycemic control:  Nutrition:    CODE STATUS:        Interval Events: Patient seen and examined at bedside. No overnight events.    Review of Systems:  Constitutional: No fever, chills, fatigue  Neuro: No headache, numbness, weakness  Resp: No cough, wheezing, shortness of breath  CVS: No chest pain, palpitations, leg swelling  GI: No abdominal pain, nausea, vomiting, diarrhea   : No dysuria, frequency, incontinence  Skin: No itching, burning, rashes, or lesions   Msk: No joint pain or swelling  Psych: No depression, anxiety, mood swings    ICU Vital Signs Last 24 Hrs  T(C): 36.4 (31 Oct 2023 08:07), Max: 36.7 (30 Oct 2023 16:21)  T(F): 97.6 (31 Oct 2023 08:07), Max: 98 (30 Oct 2023 16:21)  HR: 74 (31 Oct 2023 08:00) (65 - 128)  BP: 106/65 (31 Oct 2023 08:00) (102/64 - 178/99)  BP(mean): 81 (31 Oct 2023 08:00) (74 - 133)  ABP: --  ABP(mean): --  RR: 17 (31 Oct 2023 08:00) (14 - 44)  SpO2: 96% (31 Oct 2023 08:00) (93% - 97%)    O2 Parameters below as of 31 Oct 2023 08:00  Patient On (Oxygen Delivery Method): room air        10-30-23 @ 07:01  -  10-31-23 @ 07:00  --------------------------------------------------------  IN: 475 mL / OUT: 1100 mL / NET: -625 mL    10-31-23 @ 07:01  -  10-31-23 @ 08:53  --------------------------------------------------------  IN: 225 mL / OUT: 0 mL / NET: 225 mL      I&O's Summary    30 Oct 2023 07:01  -  31 Oct 2023 07:00  --------------------------------------------------------  IN: 475 mL / OUT: 1100 mL / NET: -625 mL    31 Oct 2023 07:01  -  31 Oct 2023 08:53  --------------------------------------------------------  IN: 225 mL / OUT: 0 mL / NET: 225 mL        Physical Exam:     Gen: NAD  Neuro: A&Ox3, interactive  HEENT: Moist mucous membranes, EOMI, PERRL, conjunctiva and sclera clear  Resp: CTAB  CVS: RRR, S1S2, no m/r/g  Abd: Soft, Nontender, Nondistended; Bowel sounds present  Ext: R Radial WDL +2 pulse, no clubbing or cyanosis   Skin: warm, dry      Meds:    metoprolol succinate ER Oral  sacubitril 24 mG/valsartan 26 mG Oral    atorvastatin Oral  levothyroxine Oral      acetaminophen     Tablet .. Oral PRN  zolpidem Oral PRN      aspirin enteric coated Oral  heparin   Injectable SubCutaneous  ticagrelor Oral        chlorhexidine 2% Cloths Topical                              16.3   11.49 )-----------( 254      ( 31 Oct 2023 05:53 )             48.6       10-31    137  |  104  |  17  ----------------------------<  113<H>  4.4   |  25  |  0.87    Ca    9.2      31 Oct 2023 05:53  Phos  2.6     10-30  Mg     1.9     10-31    TPro  7.6  /  Alb  3.7  /  TBili  0.8  /  DBili  x   /  AST  39<H>  /  ALT  33  /  AlkPhos  66  10-31      CARDIAC MARKERS ( 29 Oct 2023 19:30 )  x     / x     / 453 U/L / x     / x          PT/INR - ( 31 Oct 2023 05:53 )   PT: 12.1 sec;   INR: 1.04 ratio         PTT - ( 31 Oct 2023 05:53 )  PTT:37.4 sec  Urinalysis Basic - ( 31 Oct 2023 05:53 )    Color: x / Appearance: x / SG: x / pH: x  Gluc: 113 mg/dL / Ketone: x  / Bili: x / Urobili: x   Blood: x / Protein: x / Nitrite: x   Leuk Esterase: x / RBC: x / WBC x   Sq Epi: x / Non Sq Epi: x / Bacteria: x    Tubes/Lines: L PIV      GLOBAL ISSUE/BEST PRACTICE:  Analgesia: N  Sedation: N  HOB elevation: Y  Stress ulcer prophylaxis: N  VTE prophylaxis: Y  Glycemic control: Y  Nutrition: Y    CODE STATUS: Full Code       Interval Events: Patient seen and examined at bedside. No overnight events. Patient states he is doing well. Reports mild tenderness in right radial access site    Review of Systems:  Constitutional: No fever, chills, fatigue  Neuro: No headache, numbness, weakness  Resp: No cough, wheezing, shortness of breath  CVS: No chest pain, palpitations, leg swelling  GI: No abdominal pain, nausea, vomiting, diarrhea   : No dysuria, frequency, incontinence  Skin: No itching, burning, rashes, or lesions   Msk: No joint pain or swelling +mild tenderness in right radial access site  Psych: No depression, anxiety, mood swings    ICU Vital Signs Last 24 Hrs  T(C): 36.4 (31 Oct 2023 08:07), Max: 36.7 (30 Oct 2023 16:21)  T(F): 97.6 (31 Oct 2023 08:07), Max: 98 (30 Oct 2023 16:21)  HR: 74 (31 Oct 2023 08:00) (65 - 128)  BP: 106/65 (31 Oct 2023 08:00) (102/64 - 178/99)  BP(mean): 81 (31 Oct 2023 08:00) (74 - 133)  ABP: --  ABP(mean): --  RR: 17 (31 Oct 2023 08:00) (14 - 44)  SpO2: 96% (31 Oct 2023 08:00) (93% - 97%)    O2 Parameters below as of 31 Oct 2023 08:00  Patient On (Oxygen Delivery Method): room air        10-30-23 @ 07:01  -  10-31-23 @ 07:00  --------------------------------------------------------  IN: 475 mL / OUT: 1100 mL / NET: -625 mL    10-31-23 @ 07:01  -  10-31-23 @ 08:53  --------------------------------------------------------  IN: 225 mL / OUT: 0 mL / NET: 225 mL      I&O's Summary    30 Oct 2023 07:01  -  31 Oct 2023 07:00  --------------------------------------------------------  IN: 475 mL / OUT: 1100 mL / NET: -625 mL    31 Oct 2023 07:01  -  31 Oct 2023 08:53  --------------------------------------------------------  IN: 225 mL / OUT: 0 mL / NET: 225 mL        Physical Exam:     Gen: NAD  Neuro: A&Ox3, interactive  HEENT: Moist mucous membranes, EOMI, PERRL, conjunctiva and sclera clear  Resp: CTAB  CVS: RRR, S1S2, no m/r/g  Abd: Soft, Nontender, Nondistended; Bowel sounds present  Ext: R Radial WDL +2 pulse, no clubbing or cyanosis   Skin: warm, dry      Meds:    metoprolol succinate ER Oral  sacubitril 24 mG/valsartan 26 mG Oral    atorvastatin Oral  levothyroxine Oral      acetaminophen     Tablet .. Oral PRN  zolpidem Oral PRN      aspirin enteric coated Oral  heparin   Injectable SubCutaneous  ticagrelor Oral        chlorhexidine 2% Cloths Topical                              16.3   11.49 )-----------( 254      ( 31 Oct 2023 05:53 )             48.6       10-31    137  |  104  |  17  ----------------------------<  113<H>  4.4   |  25  |  0.87    Ca    9.2      31 Oct 2023 05:53  Phos  2.6     10-30  Mg     1.9     10-31    TPro  7.6  /  Alb  3.7  /  TBili  0.8  /  DBili  x   /  AST  39<H>  /  ALT  33  /  AlkPhos  66  10-31      CARDIAC MARKERS ( 29 Oct 2023 19:30 )  x     / x     / 453 U/L / x     / x          PT/INR - ( 31 Oct 2023 05:53 )   PT: 12.1 sec;   INR: 1.04 ratio         PTT - ( 31 Oct 2023 05:53 )  PTT:37.4 sec  Urinalysis Basic - ( 31 Oct 2023 05:53 )    Color: x / Appearance: x / SG: x / pH: x  Gluc: 113 mg/dL / Ketone: x  / Bili: x / Urobili: x   Blood: x / Protein: x / Nitrite: x   Leuk Esterase: x / RBC: x / WBC x   Sq Epi: x / Non Sq Epi: x / Bacteria: x    Tubes/Lines: L PIV      GLOBAL ISSUE/BEST PRACTICE:  Analgesia: N  Sedation: N  HOB elevation: Y  Stress ulcer prophylaxis: N  VTE prophylaxis: Y  Glycemic control: Y  Nutrition: Y    CODE STATUS: Full Code

## 2023-10-31 NOTE — PROGRESS NOTE ADULT - PROBLEM SELECTOR PLAN 1
Patient presented with cp and associated diaphoresis: admitted to ICU  for Staged PCI  - EKG significant for inferior JESSI with subtle ST depressions in V1 and V2. Labs notable for troponin 96.2  - S/P  cardiac cath with interventional cardiology Dr. Redding with DWAINE to LAD  - Started on ASA and Brillinta   -s/p second stage cath 10/30  - Further management as per ICU team and interventional cardiology
Patient presented with cp and associated diaphoresis: admitted to ICU  for Staged PCI  - EKG significant for inferior JESSI with subtle ST depressions in V1 and V2. Labs notable for troponin 96.2  - S/P  cardiac cath with interventional cardiology Dr. Redding with DWAINE to LAD  - Started on ASA and Brillinta   -s/p second stage cath today  - Further management as per ICU team and interventional cardiology

## 2023-10-31 NOTE — DISCHARGE NOTE NURSING/CASE MANAGEMENT/SOCIAL WORK - PATIENT PORTAL LINK FT
You can access the FollowMyHealth Patient Portal offered by [facility] by registering at the following website: [email or web address]. By joining [facility]’s FollowMyHealth portal, you will also be able to view your health information using other applications (apps) compatible with our system.

## 2023-10-31 NOTE — PROGRESS NOTE ADULT - SUBJECTIVE AND OBJECTIVE BOX
[facility] - Jenni Wilkins, Mahesh, Trent, Alpesh Chris  Office Number:  [phone]    Patient resting comfortably in bed in NAD.  Laying flat with no respiratory distress.  No complaints of chest pain, dyspnea, palpitations, PND, or orthopnea.    ROS: negative unless otherwise mentioned.    Telemetry:  sr    MEDICATIONS  (STANDING):  aspirin enteric coated 81 milliGRAM(s) Oral <User Schedule>  atorvastatin 80 milliGRAM(s) Oral at bedtime  chlorhexidine 2% Cloths 1 Application(s) Topical <User Schedule>  heparin   Injectable 5000 Unit(s) SubCutaneous every 8 hours  levothyroxine 88 MICROGram(s) Oral daily  metoprolol succinate  milliGRAM(s) Oral daily  sacubitril 24 mG/valsartan 26 mG 1 Tablet(s) Oral two times a day  ticagrelor 90 milliGRAM(s) Oral every 12 hours    MEDICATIONS  (PRN):  acetaminophen     Tablet .. 650 milliGRAM(s) Oral every 6 hours PRN Mild Pain (1 - 3)  zolpidem 5 milliGRAM(s) Oral at bedtime PRN Insomnia      Allergies    penicillin (Swelling)    Intolerances        Vital Signs Last 24 Hrs  T(C): 36.4 (31 Oct 2023 08:07), Max: 36.7 (30 Oct 2023 16:21)  T(F): 97.6 (31 Oct 2023 08:07), Max: 98 (30 Oct 2023 16:21)  HR: 75 (31 Oct 2023 09:00) (65 - 128)  BP: 109/64 (31 Oct 2023 09:00) (102/64 - 178/99)  BP(mean): 81 (31 Oct 2023 09:00) (74 - 133)  RR: 22 (31 Oct 2023 09:00) (14 - 44)  SpO2: 91% (31 Oct 2023 09:00) (91% - 97%)    Parameters below as of 31 Oct 2023 08:00  Patient On (Oxygen Delivery Method): room air        I&O's Summary    30 Oct 2023 07:01  -  31 Oct 2023 07:00  --------------------------------------------------------  IN: 475 mL / OUT: 1100 mL / NET: -625 mL    31 Oct 2023 07:01  -  31 Oct 2023 09:42  --------------------------------------------------------  IN: 225 mL / OUT: 0 mL / NET: 225 mL        ON EXAM:    General: NAD, awake and alert, oriented x 3  HEENT: Mucous membranes are moist, anicteric  Lungs: Non-labored, breath sounds are clear bilaterally, No wheezing, rales or rhonchi  Cardiovascular: Regular, S1 and S2, no murmurs, rubs, or gallops  Gastrointestinal: Bowel Sounds present, soft, nontender.   Lymph: No peripheral edema. No lymphadenopathy.  Skin: No rashes or ulcers  Psych:  Mood & affect appropriate      LABS: All Labs Reviewed:                        16.3   11.49 )-----------( 254      ( 31 Oct 2023 05:53 )             48.6                         15.3   10.63 )-----------( 232      ( 30 Oct 2023 06:10 )             47.1                         15.2   11.83 )-----------( 220      ( 29 Oct 2023 08:23 )             46.5     31 Oct 2023 05:53    137    |  104    |  17     ----------------------------<  113    4.4     |  25     |  0.87   30 Oct 2023 06:10    137    |  103    |  15     ----------------------------<  113    3.8     |  28     |  0.87   29 Oct 2023 19:30    139    |  103    |  16     ----------------------------<  120    4.3     |  29     |  1.10     Ca    9.2        31 Oct 2023 05:53  Ca    9.0        30 Oct 2023 06:10  Ca    9.2        29 Oct 2023 19:30  Phos  2.6       30 Oct 2023 06:10  Phos  3.1       29 Oct 2023 19:30  Phos  2.1       29 Oct 2023 08:23  Mg     1.9       31 Oct 2023 05:53  Mg     2.0       30 Oct 2023 06:10  Mg     2.3       29 Oct 2023 19:30    TPro  7.6    /  Alb  3.7    /  TBili  0.8    /  DBili  x      /  AST  39     /  ALT  33     /  AlkPhos  66     31 Oct 2023 05:53  TPro  7.5    /  Alb  3.8    /  TBili  0.8    /  DBili  x      /  AST  64     /  ALT  31     /  AlkPhos  65     29 Oct 2023 08:23  TPro  7.7    /  Alb  4.1    /  TBili  0.5    /  DBili  x      /  AST  18     /  ALT  28     /  AlkPhos  59     28 Oct 2023 15:30    PT/INR - ( 31 Oct 2023 05:53 )   PT: 12.1 sec;   INR: 1.04 ratio         PTT - ( 31 Oct 2023 05:53 )  PTT:37.4 sec  CARDIAC MARKERS ( 29 Oct 2023 19:30 )  x     / x     / 453 U/L / x     / x          Blood Culture:     10-28 @ 18:11  TSH: 0.41

## 2023-10-31 NOTE — PROGRESS NOTE ADULT - ASSESSMENT
78 y/o Male with HTN, HLD, Hypothyroidism, Ulcerative Colitis presents to  ED complaining of with acute onset substernal chest pain with:      1. Anterior STEMI         Neuro:  - Alert and oriented, mentating at baseline.   - tylenol for pain control PRN.     Resp:   - Maintaining O2>93% on RA    CV:   - EKG on admission with inferior ST elevation with subtle ST depressions in V1 and V2.   - s/p DWAINE x3 to LAD  - DAPT w/ ASA and Brilinta to promote stent patentcy.    - Estimated EF 40% on LV gram.  - 10/29 TTE: ejection fraction visually estimated at 40 to 45 %. Regional wall motion abnormalities present.   - cont. Statin  - Lipid panel: LDL 96 (goal <70)  - cont. metoprolol 50mg BID  - s/p PCI to LCx. 2 stents placed on 10/30    GI:   - Dash diet, tolerating well.   - GI prophylaxis with Protonix.    Renal:   - Stable renal indicies.   - s/p gentle hydration for renal protection from contraction load.   - Voiding independently. Adequate urine output.   - Trend labs, replete lytes as needed to maintain K>4, Mg>2, Phos >3.    Endo:    - Maintain euthyroid.   - C/W Synthroid 88mcg    ID:   - Leukocytosis, afebrile. No overt s/s of underlying infectious etiology. Likely reactive.   - Monitor off abx. Trend WBC and fevers.     Heme:   - H/H stable.   - Trend H/H, transfuse for hgb <8.0 if necessary.   - No need for Full AC per Interventionalist.   - DVT prophylaxis with Heparin. SCD in place.     78 y/o Male with HTN, HLD, Hypothyroidism, Ulcerative Colitis presents to  ED complaining of with acute onset substernal chest pain with:      1. Anterior STEMI         Neuro:  - Alert and oriented, mentating at baseline.   - tylenol for pain control PRN.     Resp:   - Maintaining O2>93% on RA    CV:   - EKG on admission with inferior ST elevation with subtle ST depressions in V1 and V2.   - s/p DWAINE x3 to LAD  - DAPT w/ ASA and Brilinta to promote stent patentcy.    - Estimated EF 40% on LV gram.  - 10/29 TTE: ejection fraction visually estimated at 40 to 45 %. Regional wall motion abnormalities present.   - cont. Statin  - Lipid panel: LDL 96 (goal <70)  - cont. metoprolol 50mg BID  - s/p PCI to LCx. 2 stents placed on 10/30    GI:   - Dash diet, tolerating well.   - GI prophylaxis with Protonix.    Renal:   - Stable renal indicies.   - s/p gentle hydration for renal protection from contraction load.   - Voiding independently. Adequate urine output.   - Trend labs, Mg 1.9, give Mag sulf  - replete lytes as needed to maintain K>4, Mg>2, Phos >3.    Endo:    - Maintain euthyroid.   - C/W Synthroid 88mcg    ID:   - Leukocytosis, afebrile. No overt s/s of underlying infectious etiology. Likely reactive.   - Monitor off abx. Trend WBC and fevers.     Heme:   - H/H stable.   - Trend H/H, transfuse for hgb <8.0 if necessary.   - No need for Full AC per Interventionalist.   - DVT prophylaxis with Heparin. SCD in place.     76 y/o Male with HTN, HLD, Hypothyroidism, Ulcerative Colitis presents to  ED complaining of with acute onset substernal chest pain with:      1. Anterior STEMI         Neuro:  - Alert and oriented, mentating at baseline.   - tylenol for pain control PRN.     Resp:   - Maintaining O2>93% on RA    CV:   - EKG on admission with inferior ST elevation with subtle ST depressions in V1 and V2.   - s/p DWAINE x3 to LAD  - DAPT w/ ASA and Brilinta to promote stent patentcy.    - Estimated EF 40% on LV gram.  - 10/29 TTE: ejection fraction visually estimated at 40 to 45 %. Regional wall motion abnormalities present.   - cont. Statin  - Lipid panel: LDL 96 (goal <70)  - cont. metoprolol 50mg BID  - s/p PCI to LCx. 2 stents placed on 10/30  - 10/31 EKG- read by Cath NP: NSR, still with biphasic TW and without significant interval change from prior ECG      GI:   - Dash diet, tolerating well.   - GI prophylaxis with Protonix.    Renal:   - Stable renal indicies.   - s/p gentle hydration for renal protection from contraction load.   - Voiding independently. Adequate urine output.   - Trend labs, Mg 1.9, give Mag sulf  - replete lytes as needed to maintain K>4, Mg>2, Phos >3.    Endo:    - Maintain euthyroid.   - C/W Synthroid 88mcg    ID:   - Leukocytosis, afebrile. No overt s/s of underlying infectious etiology. Likely reactive.   - Monitor off abx. Trend WBC and fevers.     Heme:   - H/H stable.   - Trend H/H, transfuse for hgb <8.0 if necessary.   - No need for Full AC per Interventionalist.   - DVT prophylaxis with Heparin. SCD in place.

## 2023-10-31 NOTE — PROGRESS NOTE ADULT - ASSESSMENT
78 y/o Male with HTN, HLD, Pre-DM (a1c 6.4%), Hypothyroidism, Ulcerative Colitis presents to  ED complaining of with acute onset substernal chest pain found to have STEMI    EKG on admission with inferior/lat ST elevation with subtle ST depressions in V1 and V2.   Taken emergently to Cath Lab, s/p DWAINE x3 to LAD.   s/p staged PCI to LCx, 10/30  c/w DAPT - ASA and Brilinta    Meds to beds on discharge for 30 day supply of DAPT  Estimated EF 40% on LV gram. TTE with an EF of 40-45% and abnormal apex, inferoseptal walls.   c/w high dose Statin.  Change lopressor to toprol xl 100. No additional NSVT overnight.  agree with initiation of Entresto 24-26mg BID given his LV dysfunction  eventual Farxiga or Jardiance  tele monitoring  monitor hemodynamics carefully  Monitor and replete lytes, keep K>4 and Mg >2  Further cardiac workup will depend on clinical course.   To follow up in our office upon dc

## 2023-10-31 NOTE — PROGRESS NOTE ADULT - SUBJECTIVE AND OBJECTIVE BOX
Patient seen and examined  feels well in good spirits  for dc planning for today  Review of Systems:  General:denies fever chills, headache, weakness  HEENT: denies blurry vision,diffculty swallowing, difficulty hearing, tinnitus  Cardiovascular: denies chest pain  ,palpitations  Pulmonary:denies shortness of breath, cough, wheezing, hemoptysis  Gastrointestinal: denies abdominal pain, constipation, diarrhea,nausea , vomiting, hematochezia  : denies hematuria, dysuria, or incontinence  Neurological: denies weakness, numbness , tingling, dizziness, tremors  MSK: denies muscle pain, difficulty ambulating, swelling, back pain  skin: denies skin rash, itching, burning, or  skin lesions  Psychiatrical: denies mood disturbances, anxierty, feeling depressed, depression , or difficulty sleeping    Objective:  Vitals  T(C): 36.4 (10-31-23 @ 08:07), Max: 36.7 (10-30-23 @ 16:21)  HR: 75 (10-31-23 @ 09:00) (65 - 82)  BP: 109/64 (10-31-23 @ 09:00) (102/64 - 178/99)  RR: 22 (10-31-23 @ 09:00) (14 - 44)  SpO2: 91% (10-31-23 @ 09:00) (91% - 97%)    Physical Exam:  General: comfortable, no acute distress  HEENT: Atraumatic, no LAD, trachea midline, PERRLA  Cardiovascular: normal s1s2, no murmurs, gallops or fricition rubs  Pulmonary: clear to ausculation Bilaterally, no wheezing , rhonchi  Gastrointestinal: soft non tender non distended, no masses felt, no organomegally  Muscloskeletal: no lower extremity edema, intact bilateral lower extremity pulses  Neurological: CN II-12 intact. No focal weakness  Psychiatrical: normal mood, cooperative  SKIN: no rash, lesions or ulcers    Labs:                          16.3   11.49 )-----------( 254      ( 31 Oct 2023 05:53 )             48.6     10-31    137  |  104  |  17  ----------------------------<  113<H>  4.4   |  25  |  0.87    Ca    9.2      31 Oct 2023 05:53  Phos  2.6     10-30  Mg     1.9     10-31    TPro  7.6  /  Alb  3.7  /  TBili  0.8  /  DBili  x   /  AST  39<H>  /  ALT  33  /  AlkPhos  66  10-31    LIVER FUNCTIONS - ( 31 Oct 2023 05:53 )  Alb: 3.7 g/dL / Pro: 7.6 g/dL / ALK PHOS: 66 U/L / ALT: 33 U/L / AST: 39 U/L / GGT: x           PT/INR - ( 31 Oct 2023 05:53 )   PT: 12.1 sec;   INR: 1.04 ratio         PTT - ( 31 Oct 2023 05:53 )  PTT:37.4 sec      Active Medications  MEDICATIONS  (STANDING):  aspirin enteric coated 81 milliGRAM(s) Oral <User Schedule>  atorvastatin 80 milliGRAM(s) Oral at bedtime  chlorhexidine 2% Cloths 1 Application(s) Topical <User Schedule>  heparin   Injectable 5000 Unit(s) SubCutaneous every 8 hours  levothyroxine 88 MICROGram(s) Oral daily  metoprolol succinate  milliGRAM(s) Oral daily  sacubitril 24 mG/valsartan 26 mG 1 Tablet(s) Oral two times a day  ticagrelor 90 milliGRAM(s) Oral every 12 hours    MEDICATIONS  (PRN):  acetaminophen     Tablet .. 650 milliGRAM(s) Oral every 6 hours PRN Mild Pain (1 - 3)  zolpidem 5 milliGRAM(s) Oral at bedtime PRN Insomnia

## 2023-10-31 NOTE — PROGRESS NOTE ADULT - PROVIDER SPECIALTY LIST ADULT
Cardiology
Critical Care
Intervent Cardiology
Cardiology
Internal Medicine
Internal Medicine

## 2023-10-31 NOTE — PROGRESS NOTE ADULT - ASSESSMENT
78 y/o Male with HTN, HLD, Hypothyroidism, Ulcerative Colitis presents to  ED complaining of with acute onset substernal chest pain found to have STEMI.    10/28 s/p emergent LHC.       Acute STEMI presenting to [facility].        ECG suggesting deepthi-lateral STEMI with inferior involvement as well (the wrap-around portion of the apical LAD).         EF 40%.        Culprit in the prox and mid LAD.       s/p 3 DWAINE. IVUS used to guide post-dilatation balloon.       Additional severe disease noted in the Lcx.    10/30 s/p staged PCI        Successful staged PCI to the Lcx with two DWAINE       no objection to d/c from Interventional Cardiology perspective   post radial access precautions reviewed  continue dual anti platelet therapy with aspirin AND ticagrelor  Pt education provided/reinforced re: importance of strict adherence to uninterrupted DAPT for 9-12 months   cardiac rehab info provided/referral and communication to cardiac rehab to be completed during f/u appt with Dr Reddnig  continue statin, beta blocker, ARNI  remainder of GDMT for HF deferred to primary cardiologist  Lifestyle modifications discussed to reduce cardiovascular risk factors including weight reduction, smoking cessation (referral provided if applicable), medication compliance, and routine follow up with Cardiologist to track your BMI, cholesterol, and glucose levels.   follow-up 11/3 with Dr Redding for post PCI check  follow-up in 2 weeks with outpatient/referring cardiologist (pt will f/u with Dr Betts)  continue home medication regimen as appropriate  remainder of plan per primary team/non-interventional cardiology

## 2023-11-02 DIAGNOSIS — E03.9 HYPOTHYROIDISM, UNSPECIFIED: ICD-10-CM

## 2023-11-02 RX ORDER — HYDROCORTISONE 25 MG/G
2.5 CREAM TOPICAL
Qty: 60 | Refills: 0 | Status: DISCONTINUED | COMMUNITY
Start: 2022-11-15 | End: 2023-11-02

## 2023-11-02 RX ORDER — NYSTATIN 100000 [USP'U]/G
100000 CREAM TOPICAL
Qty: 30 | Refills: 0 | Status: DISCONTINUED | COMMUNITY
Start: 2022-11-10 | End: 2023-11-02

## 2023-11-02 RX ORDER — DIPHENHYDRAMINE HYDROCHLORIDE 25 MG/1
25 CAPSULE ORAL
Qty: 30 | Refills: 0 | Status: DISCONTINUED | COMMUNITY
Start: 2022-04-01 | End: 2023-11-02

## 2023-11-02 RX ORDER — AZELASTINE HYDROCHLORIDE 0.5 MG/ML
0.05 SOLUTION/ DROPS OPHTHALMIC
Qty: 6 | Refills: 0 | Status: DISCONTINUED | COMMUNITY
Start: 2022-09-23 | End: 2023-11-02

## 2023-11-02 RX ORDER — SIMVASTATIN 20 MG/1
20 TABLET, FILM COATED ORAL
Qty: 90 | Refills: 0 | Status: DISCONTINUED | COMMUNITY
Start: 2022-04-23 | End: 2023-11-02

## 2023-11-02 RX ORDER — NEOMYCIN AND POLYMYXIN B SULFATES AND HYDROCORTISONE OTIC 10; 3.5; 1 MG/ML; MG/ML; [USP'U]/ML
3.5-10000-1 SUSPENSION AURICULAR (OTIC)
Qty: 10 | Refills: 0 | Status: DISCONTINUED | COMMUNITY
Start: 2022-04-14 | End: 2023-11-02

## 2023-11-02 RX ORDER — TRIAMCINOLONE ACETONIDE 1 MG/G
0.1 CREAM TOPICAL
Qty: 30 | Refills: 0 | Status: DISCONTINUED | COMMUNITY
Start: 2022-10-20 | End: 2023-11-02

## 2023-11-03 ENCOUNTER — APPOINTMENT (OUTPATIENT)
Dept: CARDIOLOGY | Facility: CLINIC | Age: 78
End: 2023-11-03
Payer: MEDICARE

## 2023-11-03 ENCOUNTER — NON-APPOINTMENT (OUTPATIENT)
Age: 78
End: 2023-11-03

## 2023-11-03 VITALS
DIASTOLIC BLOOD PRESSURE: 79 MMHG | TEMPERATURE: 97.4 F | BODY MASS INDEX: 25.01 KG/M2 | OXYGEN SATURATION: 95 % | HEIGHT: 68 IN | WEIGHT: 165 LBS | SYSTOLIC BLOOD PRESSURE: 128 MMHG | HEART RATE: 91 BPM

## 2023-11-03 PROCEDURE — 99214 OFFICE O/P EST MOD 30 MIN: CPT

## 2023-11-03 PROCEDURE — 93000 ELECTROCARDIOGRAM COMPLETE: CPT

## 2023-11-13 PROCEDURE — C1894: CPT

## 2023-11-13 PROCEDURE — 96375 TX/PRO/DX INJ NEW DRUG ADDON: CPT

## 2023-11-13 PROCEDURE — 83880 ASSAY OF NATRIURETIC PEPTIDE: CPT

## 2023-11-13 PROCEDURE — 85730 THROMBOPLASTIN TIME PARTIAL: CPT

## 2023-11-13 PROCEDURE — 36415 COLL VENOUS BLD VENIPUNCTURE: CPT

## 2023-11-13 PROCEDURE — 84484 ASSAY OF TROPONIN QUANT: CPT

## 2023-11-13 PROCEDURE — 85610 PROTHROMBIN TIME: CPT

## 2023-11-13 PROCEDURE — 92978 ENDOLUMINL IVUS OCT C 1ST: CPT | Mod: LD

## 2023-11-13 PROCEDURE — C1769: CPT

## 2023-11-13 PROCEDURE — 71045 X-RAY EXAM CHEST 1 VIEW: CPT

## 2023-11-13 PROCEDURE — 96374 THER/PROPH/DIAG INJ IV PUSH: CPT

## 2023-11-13 PROCEDURE — C1874: CPT

## 2023-11-13 PROCEDURE — 93306 TTE W/DOPPLER COMPLETE: CPT

## 2023-11-13 PROCEDURE — 84443 ASSAY THYROID STIM HORMONE: CPT

## 2023-11-13 PROCEDURE — 80053 COMPREHEN METABOLIC PANEL: CPT

## 2023-11-13 PROCEDURE — C9600: CPT | Mod: LC

## 2023-11-13 PROCEDURE — 83735 ASSAY OF MAGNESIUM: CPT

## 2023-11-13 PROCEDURE — 86900 BLOOD TYPING SEROLOGIC ABO: CPT

## 2023-11-13 PROCEDURE — C1876: CPT

## 2023-11-13 PROCEDURE — 82550 ASSAY OF CK (CPK): CPT

## 2023-11-13 PROCEDURE — 80061 LIPID PANEL: CPT

## 2023-11-13 PROCEDURE — 80048 BASIC METABOLIC PNL TOTAL CA: CPT

## 2023-11-13 PROCEDURE — 86901 BLOOD TYPING SEROLOGIC RH(D): CPT

## 2023-11-13 PROCEDURE — 93458 L HRT ARTERY/VENTRICLE ANGIO: CPT | Mod: 59

## 2023-11-13 PROCEDURE — C9606: CPT | Mod: LD

## 2023-11-13 PROCEDURE — 86850 RBC ANTIBODY SCREEN: CPT

## 2023-11-13 PROCEDURE — 83036 HEMOGLOBIN GLYCOSYLATED A1C: CPT

## 2023-11-13 PROCEDURE — C1753: CPT

## 2023-11-13 PROCEDURE — 85025 COMPLETE CBC W/AUTO DIFF WBC: CPT

## 2023-11-13 PROCEDURE — 99285 EMERGENCY DEPT VISIT HI MDM: CPT

## 2023-11-13 PROCEDURE — C1887: CPT

## 2023-11-13 PROCEDURE — 85027 COMPLETE CBC AUTOMATED: CPT

## 2023-11-13 PROCEDURE — C1725: CPT

## 2023-11-13 PROCEDURE — 93005 ELECTROCARDIOGRAM TRACING: CPT

## 2023-11-13 PROCEDURE — 84100 ASSAY OF PHOSPHORUS: CPT

## 2023-11-22 ENCOUNTER — APPOINTMENT (OUTPATIENT)
Dept: CARDIOLOGY | Facility: CLINIC | Age: 78
End: 2023-11-22
Payer: MEDICARE

## 2023-11-22 ENCOUNTER — NON-APPOINTMENT (OUTPATIENT)
Age: 78
End: 2023-11-22

## 2023-11-22 VITALS — SYSTOLIC BLOOD PRESSURE: 123 MMHG | DIASTOLIC BLOOD PRESSURE: 73 MMHG

## 2023-11-22 VITALS
WEIGHT: 165 LBS | BODY MASS INDEX: 25.9 KG/M2 | DIASTOLIC BLOOD PRESSURE: 78 MMHG | SYSTOLIC BLOOD PRESSURE: 153 MMHG | HEART RATE: 64 BPM | HEIGHT: 67 IN

## 2023-11-22 PROCEDURE — 93000 ELECTROCARDIOGRAM COMPLETE: CPT

## 2023-11-22 PROCEDURE — 99215 OFFICE O/P EST HI 40 MIN: CPT

## 2023-12-04 ENCOUNTER — NON-APPOINTMENT (OUTPATIENT)
Age: 78
End: 2023-12-04

## 2023-12-04 LAB
ALBUMIN SERPL ELPH-MCNC: 4.3 G/DL
ALP BLD-CCNC: 76 U/L
ALT SERPL-CCNC: 32 U/L
ANION GAP SERPL CALC-SCNC: 10 MMOL/L
AST SERPL-CCNC: 21 U/L
BILIRUB SERPL-MCNC: 0.4 MG/DL
BUN SERPL-MCNC: 20 MG/DL
CALCIUM SERPL-MCNC: 10.2 MG/DL
CHLORIDE SERPL-SCNC: 99 MMOL/L
CO2 SERPL-SCNC: 29 MMOL/L
CREAT SERPL-MCNC: 0.9 MG/DL
EGFR: 89 ML/MIN/1.73M2
GLUCOSE SERPL-MCNC: 83 MG/DL
POTASSIUM SERPL-SCNC: 4 MMOL/L
PROT SERPL-MCNC: 7.2 G/DL
SODIUM SERPL-SCNC: 138 MMOL/L

## 2023-12-22 ENCOUNTER — NON-APPOINTMENT (OUTPATIENT)
Age: 78
End: 2023-12-22

## 2023-12-22 ENCOUNTER — APPOINTMENT (OUTPATIENT)
Dept: CARDIOLOGY | Facility: CLINIC | Age: 78
End: 2023-12-22
Payer: MEDICARE

## 2023-12-22 VITALS
WEIGHT: 163 LBS | HEIGHT: 67 IN | BODY MASS INDEX: 25.58 KG/M2 | HEART RATE: 68 BPM | SYSTOLIC BLOOD PRESSURE: 127 MMHG | OXYGEN SATURATION: 98 % | DIASTOLIC BLOOD PRESSURE: 75 MMHG

## 2023-12-22 PROCEDURE — 93000 ELECTROCARDIOGRAM COMPLETE: CPT

## 2023-12-22 PROCEDURE — 99214 OFFICE O/P EST MOD 30 MIN: CPT

## 2023-12-22 RX ORDER — TICAGRELOR 90 MG/1
90 TABLET ORAL TWICE DAILY
Qty: 180 | Refills: 3 | Status: COMPLETED | COMMUNITY
End: 2023-12-22

## 2023-12-22 RX ORDER — ZOLPIDEM TARTRATE 5 MG/1
5 TABLET ORAL
Qty: 30 | Refills: 0 | Status: COMPLETED | COMMUNITY
Start: 2022-09-21 | End: 2023-12-22

## 2023-12-22 NOTE — DISCUSSION/SUMMARY
[EKG obtained to assist in diagnosis and management of assessed problem(s)] : EKG obtained to assist in diagnosis and management of assessed problem(s) [FreeTextEntry1] : Yossi is a 77 y/o Male with HTN, HLD, Pre-DM (a1c 6.4%), Hypothyroidism, Ulcerative Colitis presented to [facility] ED complaining of with acute onset substernal chest pain found to have a STEMI s/p DWAINE x 3 to the LAD and staged DWAINE x 2 to LCx, now presents for follow up.  He is doing very well from a cardiac standpoint. He has no symptoms at this time and is exercising on the bicycle at the gym and feeling very well.   TTE from his hospitalization showed a reduced LVEF of ~40%. At our last visit, Entresto was increased to 49-51mg BID and Jardiance 10mg was initiated after a repeat BMP.   He is on ASA and Brilinta and has had no bleeding episodes. He is tolerating his Metoprolol succinate 100mg well, HR in the 50's-60s at home.   We will uptitrate Entresto to 97/103mg BID for further GDMT. He will call me next week with his BP readings at home to ensure they remain stable.  We will repeat a BMP and lipid panel, direct LDL the first week of January.  He will continue Jardiance 10mg daily as well.   He will continue Metoprolol succinate 100mg daily in addition to his DAPT. He will continue Lipitor 80mg daily (will repeat a lipid panel in January 2024) A repeat TTE will be done again once he is on max tolerated GDMT.  To follow up in 3 months. Will repeat a TTE at that time.

## 2023-12-22 NOTE — REASON FOR VISIT
[Hyperlipidemia] : hyperlipidemia [Hypertension] : hypertension [Coronary Artery Disease] : coronary artery disease [FreeTextEntry1] : Follow up

## 2023-12-22 NOTE — CARDIOLOGY SUMMARY
[TextEntry] : [facility] 10/28/23: Conclusions: Acute STEMI presenting to [facility]. ECG suggesting deepthi-lateral STEMI with inferior involvement as well (the wrap-around  portion of the apical LAD). EF 40%. Culprit in the prox and mid LAD. - s/p 3 DWAINE. IVUS used to guide post-dilatation balloon. Additional severe disease noted in the Lcx. Recommendations:  DAPT for 1 year. Aggressive lipid therapy. Trend CE. Echocardiogram. Start beta blocker. Staged PCI to the Lcx prior to d/c. Acute complication: No complications   [facility] 10/30/23: Conclusions: Successful staged PCI to the Lcx with two DWAINE Recommendations: DAPT for 9 mo. Aggressive lipid therapy.  TTE 10/28/23: CONCLUSIONS: 1. Technically difficult image quality. 2. Left ventricular systolic function is mildly decreased with an ejection fraction visually estimated at 40 to 45 %. Regional wall motion abnormalities present. 3. Apical septal segment, apex, and mid inferoseptal segment are abnormal. 4. There is mild (grade 1) left ventricular diastolic dysfunction. 5. Normal right ventricular cavity size, wall thickness, and systolic function. 6. Trileaflet aortic valve with normal systolic excursion. calcification of the aortic valve leaflets. 7. Trace mitral regurgitation. 8. Trace tricuspid regurgitation. 9. Trace pulmonic regurgitation. 10. The inferior vena cava is normal in size measuring 1.75 cm in diameter, (normal <2.1cm) with abnormal inspiratory collapse (abnormal <50%) consistent with mildly elevated right atrial pressure (~8, range 5-10mmHg).  EKG 12/22/23: NSR, TWI inferiorly. Improved from prior.

## 2023-12-22 NOTE — HISTORY OF PRESENT ILLNESS
[FreeTextEntry1] : Yossi is a 77 y/o Male with HTN, HLD, Pre-DM (a1c 6.4%), Hypothyroidism, Ulcerative Colitis presented to [facility] ED complaining of with acute onset substernal chest pain found to have a STEMI s/p DWAINE x 3 to the LAD and staged DWAINE x 2 to LCx, now presents for follow up.  He was last seen in November 2023 for hospital follow up. He was continued on ASA and Brilinta. Noted to have a moderately reduced LVEF to 40-45%.  He was continued on DAPT. Entresto was uptitrated to 49-51mg BID with repeat BMP in 1 week which showed normal renal function and K of 4.0 Metoprolol succinate 100mg was continued Lipitor 80mg continued as well Needs a repeat lipid panel in January to assess LDLc  Repeat TTE once on maximally tolerated GDMT   LDL-c of 96 on discharge a1c 6.4%  BPs at home: 119/69, 112/69, 117/68, 121/67, 115/72, 107/63, 102/62, HR in the 50's-60s Denies any lightheadedness, dizziness, chest pain, SOB.  Has been going to the gym and does muscle exercises but no heavy lifting.  Has been doing the bike for about 1 hour about 3 times per week

## 2024-01-01 NOTE — PROGRESS NOTE ADULT - ATTENDING COMMENTS
76 yo man with Hx htn, HLD, presenting STEMI, s/p cath with DWAINE x 3 to LAD, s/p DWAINE x2 to LCx    - Continue ASA, brillinta, statin  - Continue metoprolol and entresto    D/c home today per cardiology
76 yo man with Hx htn, HLD, presenting STEMI, s/p cath with DWAINE x 3 to LAD, s/p DWAINE x2 to LCx    - Continue ASA, brillinta, statin  - Continue metoprolol and start entresto  - Likely d/c home tomorrow
76 yo man with Hx htn, HLD, presenting STEMI, s/p cath with DWAINE x 3 to LAD, plan for staged PCI to LCx 10/30.  Today asymptomatic but with episodes of NSVT, longest 20 beats, all asymptomatic.    --minimal superficial chest pain, treated with tylenol  --CAD, STEMI s/p DEX x 3 to LAD  continue ASA, brilinta  continue statin  increase lopressor due to NSVT  agressively replace electrolytes  --stable from respiratory standpoint  --normal renal function  --PO diet, continue home mesalamine  --no infectious concerns.  --preDM based on A1c, dietary/DM educator consult  --ambulating today around ICU without difficulty  --plan discussed with pt and wife  discussed with gen and interventional cards
No

## 2024-01-04 LAB
ANION GAP SERPL CALC-SCNC: 10 MMOL/L
BUN SERPL-MCNC: 20 MG/DL
CALCIUM SERPL-MCNC: 9.2 MG/DL
CHLORIDE SERPL-SCNC: 102 MMOL/L
CHOLEST SERPL-MCNC: 130 MG/DL
CO2 SERPL-SCNC: 27 MMOL/L
CREAT SERPL-MCNC: 0.92 MG/DL
EGFR: 86 ML/MIN/1.73M2
GLUCOSE SERPL-MCNC: 95 MG/DL
HDLC SERPL-MCNC: 42 MG/DL
LDLC SERPL CALC-MCNC: 70 MG/DL
LDLC SERPL DIRECT ASSAY-MCNC: 71 MG/DL
NONHDLC SERPL-MCNC: 88 MG/DL
POTASSIUM SERPL-SCNC: 4.2 MMOL/L
SODIUM SERPL-SCNC: 139 MMOL/L
TRIGL SERPL-MCNC: 92 MG/DL

## 2024-01-26 ENCOUNTER — EMERGENCY (EMERGENCY)
Facility: HOSPITAL | Age: 79
LOS: 1 days | Discharge: HOME | End: 2024-01-26
Attending: EMERGENCY MEDICINE | Admitting: EMERGENCY MEDICINE
Payer: MEDICARE

## 2024-01-26 VITALS
RESPIRATION RATE: 16 BRPM | HEIGHT: 68 IN | DIASTOLIC BLOOD PRESSURE: 68 MMHG | HEART RATE: 69 BPM | OXYGEN SATURATION: 95 % | WEIGHT: 154.98 LBS | TEMPERATURE: 97 F | SYSTOLIC BLOOD PRESSURE: 131 MMHG

## 2024-01-26 VITALS
SYSTOLIC BLOOD PRESSURE: 112 MMHG | OXYGEN SATURATION: 96 % | DIASTOLIC BLOOD PRESSURE: 66 MMHG | TEMPERATURE: 97 F | HEART RATE: 66 BPM | RESPIRATION RATE: 20 BRPM

## 2024-01-26 DIAGNOSIS — Z96.642 PRESENCE OF LEFT ARTIFICIAL HIP JOINT: Chronic | ICD-10-CM

## 2024-01-26 LAB
ALBUMIN SERPL ELPH-MCNC: 3.1 G/DL — LOW (ref 3.3–5)
ALP SERPL-CCNC: 62 U/L — SIGNIFICANT CHANGE UP (ref 40–120)
ALT FLD-CCNC: 38 U/L — SIGNIFICANT CHANGE UP (ref 12–78)
ANION GAP SERPL CALC-SCNC: 7 MMOL/L — SIGNIFICANT CHANGE UP (ref 5–17)
AST SERPL-CCNC: 33 U/L — SIGNIFICANT CHANGE UP (ref 15–37)
BASOPHILS # BLD AUTO: 0.01 K/UL — SIGNIFICANT CHANGE UP (ref 0–0.2)
BASOPHILS NFR BLD AUTO: 0.2 % — SIGNIFICANT CHANGE UP (ref 0–2)
BILIRUB SERPL-MCNC: 0.4 MG/DL — SIGNIFICANT CHANGE UP (ref 0.2–1.2)
BUN SERPL-MCNC: 17 MG/DL — SIGNIFICANT CHANGE UP (ref 7–23)
CALCIUM SERPL-MCNC: 8.5 MG/DL — SIGNIFICANT CHANGE UP (ref 8.5–10.1)
CHLORIDE SERPL-SCNC: 105 MMOL/L — SIGNIFICANT CHANGE UP (ref 96–108)
CO2 SERPL-SCNC: 26 MMOL/L — SIGNIFICANT CHANGE UP (ref 22–31)
CREAT SERPL-MCNC: 0.9 MG/DL — SIGNIFICANT CHANGE UP (ref 0.5–1.3)
EGFR: 89 ML/MIN/1.73M2 — SIGNIFICANT CHANGE UP
EOSINOPHIL # BLD AUTO: 0.06 K/UL — SIGNIFICANT CHANGE UP (ref 0–0.5)
EOSINOPHIL NFR BLD AUTO: 1.3 % — SIGNIFICANT CHANGE UP (ref 0–6)
FLUAV AG NPH QL: DETECTED
FLUBV AG NPH QL: SIGNIFICANT CHANGE UP
GLUCOSE SERPL-MCNC: 124 MG/DL — HIGH (ref 70–99)
HCT VFR BLD CALC: 44.3 % — SIGNIFICANT CHANGE UP (ref 39–50)
HGB BLD-MCNC: 14.4 G/DL — SIGNIFICANT CHANGE UP (ref 13–17)
IMM GRANULOCYTES NFR BLD AUTO: 0.4 % — SIGNIFICANT CHANGE UP (ref 0–0.9)
LYMPHOCYTES # BLD AUTO: 0.71 K/UL — LOW (ref 1–3.3)
LYMPHOCYTES # BLD AUTO: 15.6 % — SIGNIFICANT CHANGE UP (ref 13–44)
MAGNESIUM SERPL-MCNC: 1.9 MG/DL — SIGNIFICANT CHANGE UP (ref 1.6–2.6)
MCHC RBC-ENTMCNC: 27.6 PG — SIGNIFICANT CHANGE UP (ref 27–34)
MCHC RBC-ENTMCNC: 32.5 GM/DL — SIGNIFICANT CHANGE UP (ref 32–36)
MCV RBC AUTO: 84.9 FL — SIGNIFICANT CHANGE UP (ref 80–100)
MONOCYTES # BLD AUTO: 0.69 K/UL — SIGNIFICANT CHANGE UP (ref 0–0.9)
MONOCYTES NFR BLD AUTO: 15.1 % — HIGH (ref 2–14)
NEUTROPHILS # BLD AUTO: 3.07 K/UL — SIGNIFICANT CHANGE UP (ref 1.8–7.4)
NEUTROPHILS NFR BLD AUTO: 67.4 % — SIGNIFICANT CHANGE UP (ref 43–77)
NRBC # BLD: 0 /100 WBCS — SIGNIFICANT CHANGE UP (ref 0–0)
PLATELET # BLD AUTO: 172 K/UL — SIGNIFICANT CHANGE UP (ref 150–400)
POTASSIUM SERPL-MCNC: 3.9 MMOL/L — SIGNIFICANT CHANGE UP (ref 3.5–5.3)
POTASSIUM SERPL-SCNC: 3.9 MMOL/L — SIGNIFICANT CHANGE UP (ref 3.5–5.3)
PROT SERPL-MCNC: 6.4 G/DL — SIGNIFICANT CHANGE UP (ref 6–8.3)
RBC # BLD: 5.22 M/UL — SIGNIFICANT CHANGE UP (ref 4.2–5.8)
RBC # FLD: 14 % — SIGNIFICANT CHANGE UP (ref 10.3–14.5)
RSV RNA NPH QL NAA+NON-PROBE: SIGNIFICANT CHANGE UP
SARS-COV-2 RNA SPEC QL NAA+PROBE: SIGNIFICANT CHANGE UP
SODIUM SERPL-SCNC: 138 MMOL/L — SIGNIFICANT CHANGE UP (ref 135–145)
TROPONIN I, HIGH SENSITIVITY RESULT: 12.5 NG/L — SIGNIFICANT CHANGE UP
TROPONIN I, HIGH SENSITIVITY RESULT: 14.3 NG/L — SIGNIFICANT CHANGE UP
WBC # BLD: 4.56 K/UL — SIGNIFICANT CHANGE UP (ref 3.8–10.5)
WBC # FLD AUTO: 4.56 K/UL — SIGNIFICANT CHANGE UP (ref 3.8–10.5)

## 2024-01-26 PROCEDURE — 87637 SARSCOV2&INF A&B&RSV AMP PRB: CPT

## 2024-01-26 PROCEDURE — 93010 ELECTROCARDIOGRAM REPORT: CPT

## 2024-01-26 PROCEDURE — 84484 ASSAY OF TROPONIN QUANT: CPT

## 2024-01-26 PROCEDURE — 83735 ASSAY OF MAGNESIUM: CPT

## 2024-01-26 PROCEDURE — 99285 EMERGENCY DEPT VISIT HI MDM: CPT | Mod: 25

## 2024-01-26 PROCEDURE — 99285 EMERGENCY DEPT VISIT HI MDM: CPT

## 2024-01-26 PROCEDURE — 71045 X-RAY EXAM CHEST 1 VIEW: CPT | Mod: 26

## 2024-01-26 PROCEDURE — 36415 COLL VENOUS BLD VENIPUNCTURE: CPT

## 2024-01-26 PROCEDURE — 80053 COMPREHEN METABOLIC PANEL: CPT

## 2024-01-26 PROCEDURE — 93005 ELECTROCARDIOGRAM TRACING: CPT

## 2024-01-26 PROCEDURE — 71045 X-RAY EXAM CHEST 1 VIEW: CPT

## 2024-01-26 PROCEDURE — 85025 COMPLETE CBC W/AUTO DIFF WBC: CPT

## 2024-01-26 RX ORDER — SODIUM CHLORIDE 9 MG/ML
1000 INJECTION INTRAMUSCULAR; INTRAVENOUS; SUBCUTANEOUS ONCE
Refills: 0 | Status: COMPLETED | OUTPATIENT
Start: 2024-01-26 | End: 2024-01-26

## 2024-01-26 RX ADMIN — SODIUM CHLORIDE 1000 MILLILITER(S): 9 INJECTION INTRAMUSCULAR; INTRAVENOUS; SUBCUTANEOUS at 09:22

## 2024-01-26 NOTE — ED ADULT TRIAGE NOTE - CHIEF COMPLAINT QUOTE
fainted while sitting in a chair- wife lowered patient to the chair- LOC for a minute- h/o cardiac stents- denies any chest pain

## 2024-01-26 NOTE — ED PROVIDER NOTE - OBJECTIVE STATEMENT
Over the last week and a half patient states that he has been "under the weather" and has had a dry cough and generalized malaise.  Patient states this morning he started to feel better and was making some mata and eggs for him and his wife and then felt exhausted so he sat down at the kitchen table and then wife states he suddenly passed out his head jerked backward and mouth opened.  Wife states she tried to get him off the chair onto the ground which she did successfully.  Wife reports that patient was fully limp.  Wife states patient was out for about 5 to 10 minutes.  Patient states he is never passed out before.  Patient had 5 stents placed on October 2023.  Patient denies having any chest pain.  Patient is otherwise very active, works out and drinks plenty of fluids.

## 2024-01-26 NOTE — ED PROVIDER NOTE - CLINICAL SUMMARY MEDICAL DECISION MAKING FREE TEXT BOX
Yamila: pt with episode of brief syncope Yamila: pt with episode of brief syncope, likely from dehydration/overexertion in setting of influenza A

## 2024-01-26 NOTE — ED PROVIDER NOTE - ED STEMI HIDDEN
[FreeTextEntry1] : 69 [facility] with DR. Cantu\par \par Janelle Donis ANP-C \par 
hide

## 2024-01-26 NOTE — ED PROVIDER NOTE - PATIENT PORTAL LINK FT
You can access the FollowMyHealth Patient Portal offered by [facility] by registering at the following website: [email or web address]. By joining [employer]’s FollowMyHealth portal, you will also be able to view your health information using other applications (apps) compatible with our system.

## 2024-01-26 NOTE — ED PROVIDER NOTE - PHYSICAL EXAMINATION
Gen: alert, NAD  HEENT:  NC/AT, PERR  CV:  well perfused  Pulm:  normal RR, breathing comfortably, cta b/l  Abd: s/nt/nd  MSK: moving all extremities  Neuro:  non-focal  Skin:  visualized areas intact  Psych: AOx3

## 2024-01-26 NOTE — CONSULT NOTE ADULT - ASSESSMENT
Yossi is a pleasant 78 year old male with inferolateral STEMI in 10/23 s/p PCI x 5, here with generalized malaise and a syncopal episode.    - syncope likely vagally mediated in setting of viral illness  - agree with ivf  - viral panel  - check orthostatics  - last echo with ef in 40-45% range    - no sign of acute ischemia, and troponin is negative  - ekg with nsst abn, though no worrisome findings or ectopy  - no sign of volume overload    - if BW and orthostatics negative, can dc home  - will fu with Dr. Betts in the office for outpt monitoring

## 2024-01-26 NOTE — ED ADULT NURSE NOTE - NS ED NURSE LEVEL OF CONSCIOUSNESS SPEECH
[facility]    Subjective:     Patient ID: Vj Barry is a 54 y.o. y.o. male. HPI Patient in for office for routine health maintenance of chronic conditions and medication refills. I have reviewed the patient's medical history in detail and updated the computerized patient record. He is due for lab work and is agreeable. He currently tamayo snot take medications. He voices no concerns at this visit. History reviewed. No pertinent past medical history. History reviewed. No pertinent surgical history. History reviewed. No pertinent family history. No Known Allergies    No current outpatient medications on file. No current facility-administered medications for this visit. Review of Systems   Constitutional: Negative. Negative for activity change and appetite change. Respiratory: Negative. Cardiovascular: Negative. Gastrointestinal: Negative. Musculoskeletal: Negative. Neurological: Negative. Objective:       /70 (Site: Left Upper Arm, Position: Sitting, Cuff Size: Large Adult)   Pulse 72   Temp 97.7 °F (36.5 °C)   Ht 5' 10\" (1.778 m)   Wt 195 lb (88.5 kg)   SpO2 98%   BMI 27.98 kg/m²     Physical Exam  Vitals signs and nursing note reviewed. Constitutional:       Appearance: Normal appearance. He is well-developed. HENT:      Head: Normocephalic and atraumatic. Right Ear: External ear normal.      Left Ear: External ear normal.      Nose: Nose normal.      Mouth/Throat:      Mouth: Mucous membranes are moist.   Eyes:      Conjunctiva/sclera: Conjunctivae normal.      Pupils: Pupils are equal, round, and reactive to light. Neck:      Musculoskeletal: Normal range of motion. Cardiovascular:      Rate and Rhythm: Normal rate and regular rhythm. Pulmonary:      Effort: Pulmonary effort is normal.      Breath sounds: Normal breath sounds.    Abdominal:      General: Bowel sounds are normal.      Palpations: Abdomen is soft.   Musculoskeletal: Normal range of motion. Skin:     General: Skin is warm and dry. Neurological:      Mental Status: He is alert and oriented to person, place, and time. Psychiatric:         Behavior: Behavior normal. Behavior is cooperative. Thought Content: Thought content normal.         Judgment: Judgment normal.           Assessment & Plan:      1. Routine health maintenance    - Lipid Panel; Future  - Comprehensive Metabolic Panel; Future  - CBC Auto Differential; Future    2. Family hx of prostate cancer    - PSA Screening; Future    Will call with lab work results and treat if needed. Answered all of the patient's questions. Agrees with plan of care. Follow up in one year or sooner if needed.        DODIE Pierre Arm - CNP   6/5/2020 9:31 AM EDT Speaking Coherently

## 2024-01-26 NOTE — CONSULT NOTE ADULT - SUBJECTIVE AND OBJECTIVE BOX
[facility] - Jenni Wilkins, Trent Aragon, Alpesh Chris  Office Number: [phone]    Initial Consult Note    CHIEF COMPLAINT: Patient is a 78y old  Male who presents with a chief complaint of syncope    HPI:  Over the last week and a half patient states that he has been "under the weather" and has had a dry cough and generalized malaise.  Patient states this morning he started to feel better and was making some mata and eggs for him and his wife and then felt exhausted so he sat down at the kitchen table and then wife states he suddenly passed out his head jerked backward and mouth opened.  Wife states she tried to get him off the chair onto the ground which she did successfully.  Wife reports that patient was fully limp.  Wife states patient was out for about 5 to 10 minutes.  Patient states he is never passed out before.  Patient had 5 stents placed on October 2023.  Patient denies having any chest pain.  Patient is otherwise very active, works out and drinks plenty of fluids.    Recent admission for inferolateral STEMI, s/p DWAINE x3 to LAD, then s/p staged PCI to LCx, 10/30  no chest pain or dyspnea at current time  feels that he is fighting a viral illness      PAST MEDICAL & SURGICAL HISTORY:  Hypothyroid      Colitis      History of left hip replacement          SOCIAL HISTORY:  No tobacco, ethanol, or drug abuse.    FAMILY HISTORY:  No pertinent family history in first degree relatives      No family history of acute MI or sudden cardiac death.    MEDICATIONS  (STANDING):    MEDICATIONS  (PRN):      Allergies    penicillin (Swelling)    Intolerances        REVIEW OF SYSTEMS:    CONSTITUTIONAL: No weakness, fevers or chills  EYES/ENT: No visual changes;  No vertigo or throat pain   NECK: No pain or stiffness  RESPIRATORY: No cough, wheezing, hemoptysis; No shortness of breath  CARDIOVASCULAR: No chest pain or palpitations  GASTROINTESTINAL: No abdominal pain. No nausea, vomiting, or hematemesis; No diarrhea or constipation. No melena or hematochezia.  GENITOURINARY: No dysuria, frequency or hematuria  NEUROLOGICAL: No numbness or weakness  SKIN: No itching or rash  All other review of systems is negative unless indicated above    VITAL SIGNS:   Vital Signs Last 24 Hrs  T(C): 36.2 (26 Jan 2024 08:10), Max: 36.2 (26 Jan 2024 08:10)  T(F): 97.2 (26 Jan 2024 08:10), Max: 97.2 (26 Jan 2024 08:10)  HR: 69 (26 Jan 2024 08:10) (69 - 69)  BP: 131/68 (26 Jan 2024 08:10) (131/68 - 131/68)  BP(mean): --  RR: 16 (26 Jan 2024 08:10) (16 - 16)  SpO2: 95% (26 Jan 2024 08:10) (95% - 95%)    Parameters below as of 26 Jan 2024 08:10  Patient On (Oxygen Delivery Method): room air        I&O's Summary      On Exam:    Constitutional: NAD, alert and oriented x 3  Lungs:  Non-labored, breath sounds are clear bilaterally, No wheezing, rales or rhonchi  Cardiovascular: RRR.  S1 and S2 positive.  No murmurs, rubs, gallops or clicks  Gastrointestinal: Bowel Sounds present, soft, nontender.   Lymph: No peripheral edema. No cervical lymphadenopathy.  Neurological: Alert, no focal deficits  Skin: No rashes or ulcers   Psych:  Mood & affect appropriate.    LABS: All Labs Reviewed:                        14.4   4.56  )-----------( 172      ( 26 Jan 2024 08:56 )             44.3     26 Jan 2024 08:56    138    |  105    |  17     ----------------------------<  124    3.9     |  26     |  0.90     Ca    8.5        26 Jan 2024 08:56  Mg     1.9       26 Jan 2024 08:56    TPro  6.4    /  Alb  3.1    /  TBili  0.4    /  DBili  x      /  AST  33     /  ALT  38     /  AlkPhos  62     26 Jan 2024 08:56          Blood Culture:         RADIOLOGY:    EKG: sr, nsst abn

## 2024-01-26 NOTE — ED PROVIDER NOTE - NSFOLLOWUPINSTRUCTIONS_ED_ALL_ED_FT
-- You should update your primary care physician on your Emergency Department visit and follow up with them.  If you do not have a physician or have difficulty following up, please call: [phone] to obtain a [facility] doctor or specialist who can provide follow up.    -- Return to the ER for worsening or persistent symptoms, and/or ANY NEW OR CONCERNING SYMPTOMS.

## 2024-01-26 NOTE — ED ADULT NURSE NOTE - NSFALLRISKINTERV_ED_ALL_ED
Assistance OOB with selected safe patient handling equipment if applicable/Communicate fall risk and risk factors to all staff, patient, and family/Orthostatic vital signs/Provide visual cue: yellow wristband, yellow gown, etc/Reinforce activity limits and safety measures with patient and family/Call bell, personal items and telephone in reach/Instruct patient to call for assistance before getting out of bed/chair/stretcher/Non-slip footwear applied when patient is off stretcher/[place] to call system/Physically safe environment - no spills, clutter or unnecessary equipment/Purposeful Proactive Rounding/Room/bathroom lighting operational, light cord in reach

## 2024-01-26 NOTE — ED PROVIDER NOTE - CARE PLAN
1 Principal Discharge DX:	Syncope   Principal Discharge DX:	Syncope  Secondary Diagnosis:	Influenza A

## 2024-02-16 LAB
ALBUMIN SERPL ELPH-MCNC: 4.1 G/DL
ALP BLD-CCNC: 79 U/L
ALT SERPL-CCNC: 28 U/L
ANION GAP SERPL CALC-SCNC: 11 MMOL/L
AST SERPL-CCNC: 18 U/L
BILIRUB SERPL-MCNC: 0.6 MG/DL
BUN SERPL-MCNC: 16 MG/DL
CALCIUM SERPL-MCNC: 9.3 MG/DL
CHLORIDE SERPL-SCNC: 100 MMOL/L
CO2 SERPL-SCNC: 29 MMOL/L
CREAT SERPL-MCNC: 0.84 MG/DL
EGFR: 90 ML/MIN/1.73M2
GLUCOSE SERPL-MCNC: 81 MG/DL
POTASSIUM SERPL-SCNC: 4 MMOL/L
PROT SERPL-MCNC: 6.3 G/DL
SODIUM SERPL-SCNC: 140 MMOL/L

## 2024-03-20 ENCOUNTER — APPOINTMENT (OUTPATIENT)
Dept: CARDIOLOGY | Facility: CLINIC | Age: 79
End: 2024-03-20
Payer: MEDICARE

## 2024-03-20 VITALS
SYSTOLIC BLOOD PRESSURE: 129 MMHG | HEIGHT: 67 IN | DIASTOLIC BLOOD PRESSURE: 73 MMHG | WEIGHT: 155 LBS | BODY MASS INDEX: 24.33 KG/M2

## 2024-03-20 DIAGNOSIS — I10 ESSENTIAL (PRIMARY) HYPERTENSION: ICD-10-CM

## 2024-03-20 DIAGNOSIS — I70.90 UNSPECIFIED ATHEROSCLEROSIS: ICD-10-CM

## 2024-03-20 DIAGNOSIS — I50.20 UNSPECIFIED SYSTOLIC (CONGESTIVE) HEART FAILURE: ICD-10-CM

## 2024-03-20 DIAGNOSIS — I21.3 ST ELEVATION (STEMI) MYOCARDIAL INFARCTION OF UNSPECIFIED SITE: ICD-10-CM

## 2024-03-20 DIAGNOSIS — E78.5 HYPERLIPIDEMIA, UNSPECIFIED: ICD-10-CM

## 2024-03-20 PROCEDURE — G2211 COMPLEX E/M VISIT ADD ON: CPT

## 2024-03-20 PROCEDURE — 93000 ELECTROCARDIOGRAM COMPLETE: CPT

## 2024-03-20 PROCEDURE — 99214 OFFICE O/P EST MOD 30 MIN: CPT

## 2024-03-20 RX ORDER — TICAGRELOR 90 MG/1
90 TABLET ORAL TWICE DAILY
Qty: 180 | Refills: 3 | Status: ACTIVE | COMMUNITY
Start: 2023-12-22

## 2024-03-20 RX ORDER — ATORVASTATIN CALCIUM 80 MG/1
80 TABLET, FILM COATED ORAL
Qty: 90 | Refills: 3 | Status: ACTIVE | COMMUNITY

## 2024-03-20 RX ORDER — EMPAGLIFLOZIN 10 MG/1
10 TABLET, FILM COATED ORAL DAILY
Qty: 90 | Refills: 3 | Status: DISCONTINUED | COMMUNITY
Start: 2023-11-22 | End: 2024-03-20

## 2024-03-20 RX ORDER — LEVOTHYROXINE SODIUM 0.09 MG/1
88 TABLET ORAL
Qty: 90 | Refills: 0 | Status: ACTIVE | COMMUNITY
Start: 2022-09-19

## 2024-03-20 RX ORDER — METOPROLOL SUCCINATE 100 MG/1
100 TABLET, EXTENDED RELEASE ORAL
Qty: 90 | Refills: 3 | Status: ACTIVE | COMMUNITY

## 2024-03-20 NOTE — PHYSICAL EXAM
[Well Developed] : well developed [No Acute Distress] : no acute distress [Well Nourished] : well nourished [Normal Conjunctiva] : normal conjunctiva [Normal Venous Pressure] : normal venous pressure [Normal S1, S2] : normal S1, S2 [No Carotid Bruit] : no carotid bruit [No Murmur] : no murmur [No Rub] : no rub [Clear Lung Fields] : clear lung fields [No Gallop] : no gallop [Good Air Entry] : good air entry [Soft] : abdomen soft [No Respiratory Distress] : no respiratory distress  [Non Tender] : non-tender [No Masses/organomegaly] : no masses/organomegaly [Normal Bowel Sounds] : normal bowel sounds [Normal Gait] : normal gait [No Edema] : no edema [No Cyanosis] : no cyanosis [No Clubbing] : no clubbing [No Rash] : no rash [No Varicosities] : no varicosities [No Skin Lesions] : no skin lesions [Moves all extremities] : moves all extremities [No Focal Deficits] : no focal deficits [Normal Speech] : normal speech [Alert and Oriented] : alert and oriented [Normal memory] : normal memory

## 2024-03-20 NOTE — HISTORY OF PRESENT ILLNESS
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  He was last seen in December of 2023 for follow up. His Entresto was increased to 97/103mg BID. He was continued on Jardiance 10mg, continued on Metoprolol Succinate 100mg daily. He was continued on DAPT and max dose Lipitor. He has stopped his Jardiance due to some GI symptoms.  Denies any chest pain or SOB.   He has been riding his bike and going to the gym 3 times per week.  Has been cutting down his salt intake as well His BPs have been <120 at home.

## 2024-03-20 NOTE — DISCUSSION/SUMMARY
[EKG obtained to assist in diagnosis and management of assessed problem(s)] : EKG obtained to assist in diagnosis and management of assessed problem(s) [FreeTextEntry1] : Yossi is a 77 y/o Male with HTN, HLD, Pre-DM (a1c 6.4%), Hypothyroidism, Ulcerative Colitis presented to [facility] ED complaining of with acute onset substernal chest pain found to have a STEMI s/p DWAINE x 3 to the LAD and staged DWAINE x 2 to LCx, now presents for follow up.  He is doing very well from a cardiac standpoint. He has no symptoms at this time and is exercising on the bicycle at the gym and feeling very well.  TTE from his hospitalization showed a reduced LVEF of ~40%. To now repeat a TTE today.   He is on ASA and Brilinta and has had no bleeding episodes. He is tolerating his Metoprolol succinate 100mg well, HR in the 50's-60s at home.  He will continue Entresto 97/103mg BID.  We will hold on Jardiance for now as he is technically NYHA class I without symptoms. Will repeat a TTE today.   He will continue Metoprolol succinate 100mg daily in addition to his DAPT. He will continue Lipitor 80mg daily. Lipids were done in January of 2024 which showed an LDL-c of 70.  A repeat TTE will be done now that he is on max tolerated GDMT.   To follow up in 5 months.

## 2024-03-20 NOTE — CARDIOLOGY SUMMARY
[TextEntry] : [facility] 10/28/23: Conclusions: Acute STEMI presenting to \[facility]\"". ECG suggesting deepthi-lateral STEMI with inferior involvement as well (the wrap-around  portion of the apical LAD). EF 40%. Culprit in the prox and mid LAD. - s/p 3 DWAINE. IVUS used to guide post-dilatation balloon. Additional severe disease noted in the Lcx. Recommendations:  DAPT for 1 year. Aggressive lipid therapy. Trend CE. Echocardiogram. Start beta blocker. Staged PCI to the Lcx prior to d/c. Acute complication: No complications   [facility] 10/30/23: Conclusions: Successful staged PCI to the Lcx with two DWAINE Recommendations: DAPT for 9 mo. Aggressive lipid therapy.  TTE 10/28/23: CONCLUSIONS: 1. Technically difficult image quality. 2. Left ventricular systolic function is mildly decreased with an ejection fraction visually estimated at 40 to 45 %. Regional wall motion abnormalities present. 3. Apical septal segment, apex, and mid inferoseptal segment are abnormal. 4. There is mild (grade 1) left ventricular diastolic dysfunction. 5. Normal right ventricular cavity size, wall thickness, and systolic function. 6. Trileaflet aortic valve with normal systolic excursion. calcification of the aortic valve leaflets. 7. Trace mitral regurgitation. 8. Trace tricuspid regurgitation. 9. Trace pulmonic regurgitation. 10. The inferior vena cava is normal in size measuring 1.75 cm in diameter, (normal <2.1cm) with abnormal inspiratory collapse (abnormal <50%) consistent with mildly elevated right atrial pressure (~8, range 5-10mmHg).  EKG 12/22/23: NSR, TWI inferiorly. Improved from prior. EKG 3/20/24: NSR, non specific T wave changes

## 2024-04-03 ENCOUNTER — APPOINTMENT (OUTPATIENT)
Dept: CARDIOLOGY | Facility: CLINIC | Age: 79
End: 2024-04-03
Payer: MEDICARE

## 2024-04-03 PROCEDURE — 93306 TTE W/DOPPLER COMPLETE: CPT

## 2024-06-04 RX ORDER — SACUBITRIL AND VALSARTAN 49; 51 MG/1; MG/1
49-51 TABLET, FILM COATED ORAL TWICE DAILY
Qty: 180 | Refills: 2 | Status: ACTIVE | COMMUNITY

## 2024-09-04 ENCOUNTER — NON-APPOINTMENT (OUTPATIENT)
Age: 79
End: 2024-09-04

## 2024-09-04 ENCOUNTER — APPOINTMENT (OUTPATIENT)
Dept: CARDIOLOGY | Facility: CLINIC | Age: 79
End: 2024-09-04
Payer: MEDICARE

## 2024-09-04 VITALS
BODY MASS INDEX: 24.64 KG/M2 | OXYGEN SATURATION: 96 % | WEIGHT: 157 LBS | HEART RATE: 59 BPM | DIASTOLIC BLOOD PRESSURE: 67 MMHG | HEIGHT: 67 IN | SYSTOLIC BLOOD PRESSURE: 118 MMHG

## 2024-09-04 DIAGNOSIS — E78.5 HYPERLIPIDEMIA, UNSPECIFIED: ICD-10-CM

## 2024-09-04 DIAGNOSIS — I10 ESSENTIAL (PRIMARY) HYPERTENSION: ICD-10-CM

## 2024-09-04 DIAGNOSIS — I50.20 UNSPECIFIED SYSTOLIC (CONGESTIVE) HEART FAILURE: ICD-10-CM

## 2024-09-04 DIAGNOSIS — I21.3 ST ELEVATION (STEMI) MYOCARDIAL INFARCTION OF UNSPECIFIED SITE: ICD-10-CM

## 2024-09-04 PROCEDURE — G2211 COMPLEX E/M VISIT ADD ON: CPT

## 2024-09-04 PROCEDURE — 99214 OFFICE O/P EST MOD 30 MIN: CPT

## 2024-09-04 PROCEDURE — 93000 ELECTROCARDIOGRAM COMPLETE: CPT

## 2024-09-04 NOTE — PHYSICAL EXAM

## 2024-09-04 NOTE — DISCUSSION/SUMMARY
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  He is doing very well from a cardiac standpoint. He is exercising regularly and denies any symptoms. His LVEF has normalized to 62% on GDMT. He continues to take Metoprolol succinate 100mg daily and Entresto 49-51mg BID.  He will continue ASA and Brilinta for his CAD. Brilinta will be stopped after 1 year from his STEMI on October 30th 2024.  He will continue Lipitor 80mg daily. Last LDL c was 71.  Repeat labs were done by his PCP recently, I will follow up those results and ensure his LDL has not increased.   He will follow up closely in 6 months.

## 2024-09-04 NOTE — HISTORY OF PRESENT ILLNESS
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 ED complaining of with acute onset substernal chest pain found to have a STEMI s/p DWAINE x 3 to the LAD and staged DWAINE x 2 to LCx, now presents for follow up.  He was last seen in March 2024 for follow up. He was doing well at that time. He was continued on DAPT, Metoprolol 100mg BID and Entresto 49-51mg BID (it was decreased due to symptoms from ).   He has been doing well from a cardiac standpoint. He is exercising regularly and is doing very well.

## 2024-09-04 NOTE — CARDIOLOGY SUMMARY
[TextEntry] : [facility] 10/28/23: Conclusions: Acute STEMI presenting to [facility]. ECG suggesting deepthi-lateral STEMI with inferior involvement as well (the wrap-around portion of the apical LAD). EF 40%. Culprit in the prox and mid LAD. - s/p 3 DWAINE. IVUS used to guide post-dilatation balloon. Additional severe disease noted in the Lcx. Recommendations: DAPT for 1 year. Aggressive lipid therapy. Trend CE. Echocardiogram. Start beta blocker. Staged PCI to the Lcx prior to d/c. Acute complication: No complications  [facility] 10/30/23: Conclusions: Successful staged PCI to the Lcx with two DWAINE Recommendations: DAPT for 9 mo. Aggressive lipid therapy.  TTE 10/28/23: CONCLUSIONS: 1. Technically difficult image quality. 2. Left ventricular systolic function is mildly decreased with an ejection fraction visually estimated at 40 to 45 %. Regional wall motion abnormalities present. 3. Apical septal segment, apex, and mid inferoseptal segment are abnormal. 4. There is mild (grade 1) left ventricular diastolic dysfunction. 5. Normal right ventricular cavity size, wall thickness, and systolic function. 6. Trileaflet aortic valve with normal systolic excursion. calcification of the aortic valve leaflets. 7. Trace mitral regurgitation. 8. Trace tricuspid regurgitation. 9. Trace pulmonic regurgitation. 10. The inferior vena cava is normal in size measuring 1.75 cm in diameter, (normal <2.1cm) with abnormal inspiratory collapse (abnormal <50%) consistent with mildly elevated right atrial pressure (~8, range 5-10mmHg).  TTE 4/2024: CONCLUSIONS 1. Left ventricular cavity is normal in size. Left ventricular wall thickness is normal. Left ventricular systolic function is normal with an ejection fraction of 62 % by Cramer's method of disks. 2. There is mild (grade 1) left ventricular diastolic dysfunction. 3. Normal right ventricular cavity size and normal systolic function. 4. The left atrium is normal in size. 5. Structurally normal mitral valve with normal leaflet excursion. 6. There is calcification of the mitral valve annulus. 7. Trace mitral regurgitation. 8. Trileaflet aortic valve with normal systolic excursion. There is focal calcification of the aortic valve leaflets. 9. No evidence of aortic regurgitation. 10. Compared to the transthoracic echocardiogram the left ventricle ejection fraction has improved to normal and the previous wall segment abnormalities are normal now.  EKG 12/22/23: NSR, TWI inferiorly. Improved from prior. EKG 3/20/24: NSR, non specific T wave changes EKG 9/4/24: NSR

## 2024-10-08 RX ORDER — EZETIMIBE 10 MG/1
10 TABLET ORAL
Qty: 90 | Refills: 0 | Status: ACTIVE | COMMUNITY
Start: 2024-10-08 | End: 1900-01-01

## 2024-11-12 ENCOUNTER — NON-APPOINTMENT (OUTPATIENT)
Age: 79
End: 2024-11-12

## 2024-11-15 ENCOUNTER — RX RENEWAL (OUTPATIENT)
Age: 79
End: 2024-11-15

## 2024-11-26 ENCOUNTER — RESULT REVIEW (OUTPATIENT)
Age: 79
End: 2024-11-26

## 2024-11-26 ENCOUNTER — OUTPATIENT (OUTPATIENT)
Dept: OUTPATIENT SERVICES | Facility: HOSPITAL | Age: 79
LOS: 1 days | End: 2024-11-26
Payer: MEDICARE

## 2024-11-26 VITALS
RESPIRATION RATE: 14 BRPM | SYSTOLIC BLOOD PRESSURE: 147 MMHG | HEART RATE: 53 BPM | HEIGHT: 68 IN | WEIGHT: 164.02 LBS | OXYGEN SATURATION: 96 % | DIASTOLIC BLOOD PRESSURE: 72 MMHG | TEMPERATURE: 98 F

## 2024-11-26 DIAGNOSIS — Z01.818 ENCOUNTER FOR OTHER PREPROCEDURAL EXAMINATION: ICD-10-CM

## 2024-11-26 DIAGNOSIS — M16.11 UNILATERAL PRIMARY OSTEOARTHRITIS, RIGHT HIP: ICD-10-CM

## 2024-11-26 DIAGNOSIS — Z96.642 PRESENCE OF LEFT ARTIFICIAL HIP JOINT: Chronic | ICD-10-CM

## 2024-11-26 DIAGNOSIS — Z98.890 OTHER SPECIFIED POSTPROCEDURAL STATES: Chronic | ICD-10-CM

## 2024-11-26 LAB
A1C WITH ESTIMATED AVERAGE GLUCOSE RESULT: 6.5 % — HIGH (ref 4–5.6)
ALBUMIN SERPL ELPH-MCNC: 3.8 G/DL — SIGNIFICANT CHANGE UP (ref 3.3–5)
ALP SERPL-CCNC: 66 U/L — SIGNIFICANT CHANGE UP (ref 40–120)
ALT FLD-CCNC: 37 U/L — SIGNIFICANT CHANGE UP (ref 12–78)
ANION GAP SERPL CALC-SCNC: 7 MMOL/L — SIGNIFICANT CHANGE UP (ref 5–17)
AST SERPL-CCNC: 21 U/L — SIGNIFICANT CHANGE UP (ref 15–37)
BILIRUB SERPL-MCNC: 0.3 MG/DL — SIGNIFICANT CHANGE UP (ref 0.2–1.2)
BUN SERPL-MCNC: 17 MG/DL — SIGNIFICANT CHANGE UP (ref 7–23)
CALCIUM SERPL-MCNC: 9.5 MG/DL — SIGNIFICANT CHANGE UP (ref 8.5–10.1)
CHLORIDE SERPL-SCNC: 103 MMOL/L — SIGNIFICANT CHANGE UP (ref 96–108)
CO2 SERPL-SCNC: 28 MMOL/L — SIGNIFICANT CHANGE UP (ref 22–31)
CREAT SERPL-MCNC: 0.79 MG/DL — SIGNIFICANT CHANGE UP (ref 0.5–1.3)
EGFR: 92 ML/MIN/1.73M2 — SIGNIFICANT CHANGE UP
ESTIMATED AVERAGE GLUCOSE: 140 MG/DL — HIGH (ref 68–114)
GLUCOSE SERPL-MCNC: 91 MG/DL — SIGNIFICANT CHANGE UP (ref 70–99)
HCT VFR BLD CALC: 43.7 % — SIGNIFICANT CHANGE UP (ref 39–50)
HGB BLD-MCNC: 14.3 G/DL — SIGNIFICANT CHANGE UP (ref 13–17)
MCHC RBC-ENTMCNC: 28.3 PG — SIGNIFICANT CHANGE UP (ref 27–34)
MCHC RBC-ENTMCNC: 32.7 G/DL — SIGNIFICANT CHANGE UP (ref 32–36)
MCV RBC AUTO: 86.4 FL — SIGNIFICANT CHANGE UP (ref 80–100)
MRSA PCR RESULT.: SIGNIFICANT CHANGE UP
NRBC # BLD: 0 /100 WBCS — SIGNIFICANT CHANGE UP (ref 0–0)
PLATELET # BLD AUTO: 206 K/UL — SIGNIFICANT CHANGE UP (ref 150–400)
POTASSIUM SERPL-MCNC: 4 MMOL/L — SIGNIFICANT CHANGE UP (ref 3.5–5.3)
POTASSIUM SERPL-SCNC: 4 MMOL/L — SIGNIFICANT CHANGE UP (ref 3.5–5.3)
PROT SERPL-MCNC: 7.5 G/DL — SIGNIFICANT CHANGE UP (ref 6–8.3)
RBC # BLD: 5.06 M/UL — SIGNIFICANT CHANGE UP (ref 4.2–5.8)
RBC # FLD: 13.4 % — SIGNIFICANT CHANGE UP (ref 10.3–14.5)
S AUREUS DNA NOSE QL NAA+PROBE: DETECTED
SODIUM SERPL-SCNC: 138 MMOL/L — SIGNIFICANT CHANGE UP (ref 135–145)
WBC # BLD: 8.44 K/UL — SIGNIFICANT CHANGE UP (ref 3.8–10.5)
WBC # FLD AUTO: 8.44 K/UL — SIGNIFICANT CHANGE UP (ref 3.8–10.5)

## 2024-11-26 PROCEDURE — 80053 COMPREHEN METABOLIC PANEL: CPT

## 2024-11-26 PROCEDURE — 85027 COMPLETE CBC AUTOMATED: CPT

## 2024-11-26 PROCEDURE — 73502 X-RAY EXAM HIP UNI 2-3 VIEWS: CPT

## 2024-11-26 PROCEDURE — 36415 COLL VENOUS BLD VENIPUNCTURE: CPT

## 2024-11-26 PROCEDURE — 73502 X-RAY EXAM HIP UNI 2-3 VIEWS: CPT | Mod: 26,RT

## 2024-11-26 PROCEDURE — 87641 MR-STAPH DNA AMP PROBE: CPT

## 2024-11-26 PROCEDURE — 87640 STAPH A DNA AMP PROBE: CPT

## 2024-11-26 PROCEDURE — 83036 HEMOGLOBIN GLYCOSYLATED A1C: CPT

## 2024-11-26 PROCEDURE — G0463: CPT

## 2024-11-26 PROCEDURE — 93005 ELECTROCARDIOGRAM TRACING: CPT

## 2024-11-26 PROCEDURE — 86850 RBC ANTIBODY SCREEN: CPT

## 2024-11-26 PROCEDURE — 86901 BLOOD TYPING SEROLOGIC RH(D): CPT

## 2024-11-26 PROCEDURE — 86900 BLOOD TYPING SEROLOGIC ABO: CPT

## 2024-11-26 PROCEDURE — 93010 ELECTROCARDIOGRAM REPORT: CPT

## 2024-11-26 NOTE — H&P PST ADULT - HISTORY OF PRESENT ILLNESS
79 year old male PMH MI October 2023 H/O total of 5 cardiac stents placed October 2023 [place]), Heart Failure, Hypothyroidism, Facial Eczema, Colitis, Bilateral Knee Pain ( wearing Immobilizer Braces); now with Unilateral Primary Osteoarthritis RIGHT Hip presents today for PST prior to RIGHT Total Hip Replacement with Dr Lit Monsivais on 12/9/2024.     Pt notes RIGHT Hip pain over the last 7 months. Notes decreased ROM and strength. Rates today's pain #5/10 on pain scale. uUs Tylenol arthritis for pain when needed. Notes prior steroid injections which he notes only lasted a short time. Pt notes x rays show bone on bone. Following exams, DX testing and discussions with Dr monsivais regarding treatment options pt is electing for scheduled procedure.  79 year old male PMH MI October 2023 H/O total of 5 cardiac stents placed October 2023 [place]), Heart Failure, Hypothyroidism, Facial Eczema, Colitis, Bilateral Knee Pain ( wearing Immobilizer Braces); now with Unilateral Primary Osteoarthritis RIGHT Hip presents today for PST prior to RIGHT Total Hip Replacement with Dr Lit Monsivais on 12/9/2024.     Pt notes RIGHT Hip pain over the last 7 months. Notes decreased ROM and strength. Rates today's pain #5/10 on pain scale. Uses  Tylenol arthritis for pain when needed. Notes prior steroid injections which he notes only lasted a short time. Pt notes x rays show bone on bone. Following exams, DX testing and discussions with Dr Monsivais regarding treatment options pt is electing for scheduled procedure.

## 2024-11-26 NOTE — H&P PST ADULT - LIVES WITH, PROFILE
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

## 2024-11-26 NOTE — H&P PST ADULT - PATIENT OPTIMIZED PENDING
pt notes he was seen for cardiac clearance with Dr Betts 2 weeks ago - has appointment for medical clearance with PCP Dr Garcia on 12/3/2024

## 2024-11-26 NOTE — H&P PST ADULT - PROBLEM SELECTOR PLAN 1
PST labs; CBC, CMP, Type & Screen, HgB A1C, Nose Culture ( R/O MRSA/MSSA), EKG, RIGHT Hip/Pelvis x rays.   Pt has been seen for cardiac clearance. Appointment for medical clearance scheduled with PCP on 12/3/2024. Will fax over PST results to both PCP and cardiologist for review and clearances. Pt instructed to stop any NSAIDS/Herbal Supplements one week prior to procedure. May take Tylenol if needed for any pain between now and procedure. Pt was instructed to REMAIN ON BABY ASA secondary to 5 cardiac stents. Morning of procedure he may take his Baby ASA, Entresto and Levothyroxine with small sips of water. Pre-op instructions as well as pre-op wash instructions given to pt with understanding verbalized. All questions addressed with pt prior to him leaving the PST department today.

## 2024-11-26 NOTE — H&P PST ADULT - ASSESSMENT
79 year old male PMH MI October 2023 H/O total of 5 cardiac stents placed October 2023 [place]), Heart Failure, Hypothyroidism, Facial Eczema, Colitis, Bilateral Knee Pain ( wearing Immobilizer Braces); now with Unilateral Primary Osteoarthritis RIGHT Hip presents today for PST prior to RIGHT Total Hip Replacement with Dr Lit Monsivais on 12/9/2024.

## 2024-11-26 NOTE — H&P PST ADULT - MUSCULOSKELETAL COMMENTS
RIGHT Hip Discomfort - decreased ROM and strength - SEE HPI also notes bilateral knee pain wearing braces for immobilization

## 2024-11-26 NOTE — H&P PST ADULT - DIAGNOSTICS OTHER REVIEWED
[facility]    PATIENT'S NAME: Camila REYES   ATTENDING PHYSICIAN: Eric Holland DO   OPERATING PHYSICIAN: Eric Holland DO   PATIENT ACCOUNT#:   [de-identified]    LOCATION:  [place] [address]  MEDICAL RECORD #:   [id number]       YOB: 1952  ADMISSION DATE:       05/04/2023      OPERATION DATE:  05/04/2023    CARDIAC PROCEDURE TRANSCRIPTION      CARDIAC CATHETERIZATION  PREOPERATIVE DIAGNOSIS:    POSTOPERATIVE DIAGNOSIS:    PROCEDURE PERFORMED:  Left heart catheterization, bilateral coronary angiogram.    SEDATION:  Conscious sedation was performed for 15 minutes. INDICATION:  Chest pain with abnormal stress test.    DESCRIPTION OF PROCEDURE:  Informed consent was obtained. The patient was placed in the cardiac catheterization lab. Right wrist was sterilely draped and prepped. Following 1% lidocaine anesthesia, the right radial artery was cannulated. A 6-Amharic sheath was inserted. At the end of the procedure, a radial band was applied to achieve hemostasis. The patient received heparin, verapamil, and nitroglycerin during the procedure. We used TIG 6-Amharic catheter for left and right coronary angiography. The same catheter was used for left ventricular end-diastolic pressure measurement and pullback pressures were obtained across the aortic valve. FINDINGS:  The left main is normal in appearance. The left anterior descending artery and its branches are normal in appearance. Circumflex coronary artery is a large and dominant vessel, normal in appearance. Right coronary artery is a small nondominant vessel, normal in appearance. Left ventricular end-diastolic pressure is 2 mmHg and about a 10 mm gradient across the aortic valve. IMPRESSION:    1. Normal coronary arteries. 2.   Normal filling pressures.      Dictated By Quintin Dhillon DO  d: 05/04/2023 10:50:42  t: 05/04/2023 11:14:52  Job [id number]  /
No

## 2024-11-26 NOTE — H&P PST ADULT - ADDITIONAL PE
ADDENDUM 11/27/2024: Nose Culture obtained in PST on 11/26/24 = Staph Aureus Detected. RX for Mupirocin Ointment 2 % E-Scribed to patients preferred pharmacy. Pt called and notified of results. Reinforced use of medication with pt who verbalizes understanding. All PST results faxed to Dr Monsivais, PCP and Cardiologist.   Eva ORTIZ

## 2024-11-26 NOTE — H&P PST ADULT - NSICDXFAMILYHX_GEN_ALL_CORE_FT
FAMILY HISTORY:  Father  Still living? No  Family history of death of natural cause, Age at diagnosis: Age Unknown  Family history of peptic ulcer, Age at diagnosis: Age Unknown    Mother  Still living? No  Family history of pneumonia, Age at diagnosis: Age Unknown

## 2024-11-26 NOTE — H&P PST ADULT - MUSCULOSKELETAL
RIGHT Hip Discomfort also wearing bilateral knee braces/decreased ROM/decreased ROM due to pain details…

## 2024-11-26 NOTE — H&P PST ADULT - NSICDXPASTMEDICALHX_GEN_ALL_CORE_FT
PAST MEDICAL HISTORY:  Bilateral knee pain     Colitis     Facial eczema     History of heart failure     Hypothyroid     STEMI (ST elevation myocardial infarction)     Unilateral primary osteoarthritis, right hip

## 2024-12-09 ENCOUNTER — OUTPATIENT (OUTPATIENT)
Dept: OUTPATIENT SERVICES | Facility: HOSPITAL | Age: 79
LOS: 1 days | End: 2024-12-09
Payer: MEDICARE

## 2024-12-09 ENCOUNTER — TRANSCRIPTION ENCOUNTER (OUTPATIENT)
Age: 79
End: 2024-12-09

## 2024-12-09 ENCOUNTER — RESULT REVIEW (OUTPATIENT)
Age: 79
End: 2024-12-09

## 2024-12-09 VITALS
TEMPERATURE: 98 F | HEIGHT: 68 IN | DIASTOLIC BLOOD PRESSURE: 72 MMHG | WEIGHT: 164.02 LBS | RESPIRATION RATE: 14 BRPM | SYSTOLIC BLOOD PRESSURE: 189 MMHG | HEART RATE: 68 BPM | OXYGEN SATURATION: 96 %

## 2024-12-09 VITALS — DIASTOLIC BLOOD PRESSURE: 74 MMHG | OXYGEN SATURATION: 94 % | SYSTOLIC BLOOD PRESSURE: 120 MMHG | HEART RATE: 74 BPM

## 2024-12-09 DIAGNOSIS — M16.11 UNILATERAL PRIMARY OSTEOARTHRITIS, RIGHT HIP: ICD-10-CM

## 2024-12-09 DIAGNOSIS — Z96.642 PRESENCE OF LEFT ARTIFICIAL HIP JOINT: Chronic | ICD-10-CM

## 2024-12-09 DIAGNOSIS — Z98.890 OTHER SPECIFIED POSTPROCEDURAL STATES: Chronic | ICD-10-CM

## 2024-12-09 LAB
ANION GAP SERPL CALC-SCNC: 6 MMOL/L — SIGNIFICANT CHANGE UP (ref 5–17)
BUN SERPL-MCNC: 17 MG/DL — SIGNIFICANT CHANGE UP (ref 7–23)
CALCIUM SERPL-MCNC: 8.5 MG/DL — SIGNIFICANT CHANGE UP (ref 8.5–10.1)
CHLORIDE SERPL-SCNC: 103 MMOL/L — SIGNIFICANT CHANGE UP (ref 96–108)
CO2 SERPL-SCNC: 27 MMOL/L — SIGNIFICANT CHANGE UP (ref 22–31)
CREAT SERPL-MCNC: 0.88 MG/DL — SIGNIFICANT CHANGE UP (ref 0.5–1.3)
EGFR: 89 ML/MIN/1.73M2 — SIGNIFICANT CHANGE UP
GLUCOSE SERPL-MCNC: 143 MG/DL — HIGH (ref 70–99)
HCT VFR BLD CALC: 40.4 % — SIGNIFICANT CHANGE UP (ref 39–50)
HGB BLD-MCNC: 13.3 G/DL — SIGNIFICANT CHANGE UP (ref 13–17)
MCHC RBC-ENTMCNC: 28.4 PG — SIGNIFICANT CHANGE UP (ref 27–34)
MCHC RBC-ENTMCNC: 32.9 G/DL — SIGNIFICANT CHANGE UP (ref 32–36)
MCV RBC AUTO: 86.1 FL — SIGNIFICANT CHANGE UP (ref 80–100)
NRBC # BLD: 0 /100 WBCS — SIGNIFICANT CHANGE UP (ref 0–0)
PLATELET # BLD AUTO: 207 K/UL — SIGNIFICANT CHANGE UP (ref 150–400)
POTASSIUM SERPL-MCNC: 4.3 MMOL/L — SIGNIFICANT CHANGE UP (ref 3.5–5.3)
POTASSIUM SERPL-SCNC: 4.3 MMOL/L — SIGNIFICANT CHANGE UP (ref 3.5–5.3)
RBC # BLD: 4.69 M/UL — SIGNIFICANT CHANGE UP (ref 4.2–5.8)
RBC # FLD: 13.3 % — SIGNIFICANT CHANGE UP (ref 10.3–14.5)
SODIUM SERPL-SCNC: 136 MMOL/L — SIGNIFICANT CHANGE UP (ref 135–145)
WBC # BLD: 15.99 K/UL — HIGH (ref 3.8–10.5)
WBC # FLD AUTO: 15.99 K/UL — HIGH (ref 3.8–10.5)

## 2024-12-09 PROCEDURE — 27130 TOTAL HIP ARTHROPLASTY: CPT | Mod: RT

## 2024-12-09 PROCEDURE — 97162 PT EVAL MOD COMPLEX 30 MIN: CPT

## 2024-12-09 PROCEDURE — 73502 X-RAY EXAM HIP UNI 2-3 VIEWS: CPT | Mod: 26,RT

## 2024-12-09 PROCEDURE — 85027 COMPLETE CBC AUTOMATED: CPT

## 2024-12-09 PROCEDURE — 80048 BASIC METABOLIC PNL TOTAL CA: CPT

## 2024-12-09 PROCEDURE — 97530 THERAPEUTIC ACTIVITIES: CPT

## 2024-12-09 PROCEDURE — C9399: CPT

## 2024-12-09 PROCEDURE — 73502 X-RAY EXAM HIP UNI 2-3 VIEWS: CPT

## 2024-12-09 PROCEDURE — 36415 COLL VENOUS BLD VENIPUNCTURE: CPT

## 2024-12-09 PROCEDURE — C1776: CPT

## 2024-12-09 PROCEDURE — 97116 GAIT TRAINING THERAPY: CPT

## 2024-12-09 PROCEDURE — C1713: CPT

## 2024-12-09 DEVICE — LINER PINNACLE ALTRX 36X54MM: Type: IMPLANTABLE DEVICE | Site: RIGHT | Status: FUNCTIONAL

## 2024-12-09 DEVICE — HEAD FEM CERAMIC 12/14 36MM PLUS 5: Type: IMPLANTABLE DEVICE | Site: RIGHT | Status: FUNCTIONAL

## 2024-12-09 DEVICE — STEM FEM ACTIS HIGH COLLAR SZ 3: Type: IMPLANTABLE DEVICE | Site: RIGHT | Status: FUNCTIONAL

## 2024-12-09 DEVICE — SHELL ACET PINNACLE SECTOR W/ GRIPTION 54MM: Type: IMPLANTABLE DEVICE | Site: RIGHT | Status: FUNCTIONAL

## 2024-12-09 DEVICE — PLUG H ELIM POS STP APEX: Type: IMPLANTABLE DEVICE | Site: RIGHT | Status: FUNCTIONAL

## 2024-12-09 DEVICE — ANCHOR SUTURE JUGGERKNOT NEEDLE 2.9: Type: IMPLANTABLE DEVICE | Site: RIGHT | Status: FUNCTIONAL

## 2024-12-09 DEVICE — ANCHOR SUT VERSALOOP 2 1.8MM: Type: IMPLANTABLE DEVICE | Site: RIGHT | Status: FUNCTIONAL

## 2024-12-09 RX ORDER — SACUBITRIL AND VALSARTAN 24; 26 MG/1; MG/1
1 TABLET, FILM COATED ORAL
Refills: 0 | Status: DISCONTINUED | OUTPATIENT
Start: 2024-12-10 | End: 2024-12-23

## 2024-12-09 RX ORDER — POLYETHYLENE GLYCOL 3350 17 G/17G
17 POWDER, FOR SOLUTION ORAL AT BEDTIME
Refills: 0 | Status: DISCONTINUED | OUTPATIENT
Start: 2024-12-09 | End: 2024-12-23

## 2024-12-09 RX ORDER — NALOXONE HCL 0.4 MG/ML
1 AMPUL (ML) INJECTION
Qty: 2 | Refills: 0
Start: 2024-12-09 | End: 2024-12-13

## 2024-12-09 RX ORDER — EZETIMIBE 10 MG
1 TABLET ORAL
Refills: 0 | DISCHARGE

## 2024-12-09 RX ORDER — OXYCODONE HYDROCHLORIDE 30 MG/1
5 TABLET ORAL ONCE
Refills: 0 | Status: DISCONTINUED | OUTPATIENT
Start: 2024-12-09 | End: 2024-12-09

## 2024-12-09 RX ORDER — METOPROLOL TARTRATE 100 MG/1
100 TABLET, FILM COATED ORAL AT BEDTIME
Refills: 0 | Status: DISCONTINUED | OUTPATIENT
Start: 2024-12-10 | End: 2024-12-23

## 2024-12-09 RX ORDER — ONDANSETRON HYDROCHLORIDE 4 MG/1
4 TABLET, FILM COATED ORAL ONCE
Refills: 0 | Status: DISCONTINUED | OUTPATIENT
Start: 2024-12-09 | End: 2024-12-09

## 2024-12-09 RX ORDER — BUPIVACAINE 13.3 MG/ML
20 INJECTION, SUSPENSION, LIPOSOMAL INFILTRATION ONCE
Refills: 0 | Status: DISCONTINUED | OUTPATIENT
Start: 2024-12-09 | End: 2024-12-09

## 2024-12-09 RX ORDER — TRAMADOL HYDROCHLORIDE 300 MG/1
25 CAPSULE ORAL EVERY 6 HOURS
Refills: 0 | Status: DISCONTINUED | OUTPATIENT
Start: 2024-12-09 | End: 2024-12-09

## 2024-12-09 RX ORDER — HYDROMORPHONE HYDROCHLORIDE 2 MG/1
0.25 TABLET ORAL EVERY 6 HOURS
Refills: 0 | Status: DISCONTINUED | OUTPATIENT
Start: 2024-12-09 | End: 2024-12-09

## 2024-12-09 RX ORDER — FAMOTIDINE 20 MG/1
40 TABLET, FILM COATED ORAL EVERY 12 HOURS
Refills: 0 | Status: DISCONTINUED | OUTPATIENT
Start: 2024-12-09 | End: 2024-12-23

## 2024-12-09 RX ORDER — EZETIMIBE 10 MG
10 TABLET ORAL AT BEDTIME
Refills: 0 | Status: DISCONTINUED | OUTPATIENT
Start: 2024-12-09 | End: 2024-12-23

## 2024-12-09 RX ORDER — VANCOMYCIN HCL 900 MCG/MG
1000 POWDER (GRAM) MISCELLANEOUS ONCE
Refills: 0 | Status: DISCONTINUED | OUTPATIENT
Start: 2024-12-09 | End: 2024-12-23

## 2024-12-09 RX ORDER — TRAMADOL HYDROCHLORIDE 300 MG/1
1 CAPSULE ORAL
Qty: 20 | Refills: 0
Start: 2024-12-09 | End: 2024-12-13

## 2024-12-09 RX ORDER — SACUBITRIL AND VALSARTAN 24; 26 MG/1; MG/1
1 TABLET, FILM COATED ORAL
Refills: 0 | DISCHARGE

## 2024-12-09 RX ORDER — CELECOXIB 200 MG/1
200 CAPSULE ORAL EVERY 12 HOURS
Refills: 0 | Status: DISCONTINUED | OUTPATIENT
Start: 2024-12-09 | End: 2024-12-23

## 2024-12-09 RX ORDER — 0.9 % SODIUM CHLORIDE 0.9 %
1000 INTRAVENOUS SOLUTION INTRAVENOUS
Refills: 0 | Status: DISCONTINUED | OUTPATIENT
Start: 2024-12-09 | End: 2024-12-09

## 2024-12-09 RX ORDER — ONDANSETRON HYDROCHLORIDE 4 MG/1
1 TABLET, FILM COATED ORAL
Qty: 20 | Refills: 0
Start: 2024-12-09 | End: 2024-12-13

## 2024-12-09 RX ORDER — SODIUM CHLORIDE 9 MG/ML
1000 INJECTION, SOLUTION INTRAMUSCULAR; INTRAVENOUS; SUBCUTANEOUS
Refills: 0 | Status: DISCONTINUED | OUTPATIENT
Start: 2024-12-09 | End: 2024-12-23

## 2024-12-09 RX ORDER — ONDANSETRON HYDROCHLORIDE 4 MG/1
4 TABLET, FILM COATED ORAL EVERY 6 HOURS
Refills: 0 | Status: DISCONTINUED | OUTPATIENT
Start: 2024-12-09 | End: 2024-12-23

## 2024-12-09 RX ORDER — HYDROMORPHONE HYDROCHLORIDE 2 MG/1
0.5 TABLET ORAL
Refills: 0 | Status: DISCONTINUED | OUTPATIENT
Start: 2024-12-09 | End: 2024-12-09

## 2024-12-09 RX ORDER — LEVOTHYROXINE SODIUM 150 MCG
88 TABLET ORAL DAILY
Refills: 0 | Status: DISCONTINUED | OUTPATIENT
Start: 2024-12-10 | End: 2024-12-23

## 2024-12-09 RX ORDER — TRAMADOL HYDROCHLORIDE 300 MG/1
50 CAPSULE ORAL EVERY 6 HOURS
Refills: 0 | Status: DISCONTINUED | OUTPATIENT
Start: 2024-12-09 | End: 2024-12-09

## 2024-12-09 RX ORDER — ACETAMINOPHEN 500MG 500 MG/1
650 TABLET, COATED ORAL EVERY 6 HOURS
Refills: 0 | Status: DISCONTINUED | OUTPATIENT
Start: 2024-12-10 | End: 2024-12-23

## 2024-12-09 RX ORDER — DOCUSATE SODIUM 100 MG
1 CAPSULE ORAL
Qty: 10 | Refills: 0
Start: 2024-12-09 | End: 2024-12-13

## 2024-12-09 RX ORDER — VANCOMYCIN HCL 900 MCG/MG
1000 POWDER (GRAM) MISCELLANEOUS ONCE
Refills: 0 | Status: COMPLETED | OUTPATIENT
Start: 2024-12-09 | End: 2024-12-09

## 2024-12-09 RX ORDER — SENNOSIDES 8.6 MG
2 TABLET ORAL AT BEDTIME
Refills: 0 | Status: DISCONTINUED | OUTPATIENT
Start: 2024-12-09 | End: 2024-12-23

## 2024-12-09 RX ORDER — ACETAMINOPHEN 500MG 500 MG/1
1000 TABLET, COATED ORAL ONCE
Refills: 0 | Status: DISCONTINUED | OUTPATIENT
Start: 2024-12-09 | End: 2024-12-23

## 2024-12-09 RX ADMIN — HYDROMORPHONE HYDROCHLORIDE 0.5 MILLIGRAM(S): 2 TABLET ORAL at 14:03

## 2024-12-09 RX ADMIN — Medication 250 MILLIGRAM(S): at 08:31

## 2024-12-09 NOTE — PROGRESS NOTE ADULT - REASON FOR ADMISSION
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
SAPNA OSBORN

## 2024-12-09 NOTE — DISCHARGE NOTE PROVIDER - NSDCMRMEDTOKEN_GEN_ALL_CORE_FT
aspirin 81 mg oral delayed release tablet: 1 tab(s) orally once a day (in the morning)  atorvastatin 80 mg oral tablet: 1 tab(s) orally once a day (at bedtime)  Entresto 49 mg-51 mg oral tablet: 1 tab(s) orally 2 times a day  ezetimibe 10 mg oral tablet: 1 tab(s) orally once a day (in the evening)  levothyroxine 88 mcg (0.088 mg) oral tablet: 1 tab(s) orally once a day (in the morning)  metoprolol succinate 100 mg oral tablet, extended release: 1 tab(s) orally once a day (at bedtime)  mupirocin 2% topical ointment: Apply topically to affected area 2 times a day APPLY TO EACH NOSTRIL TWICE DAILY FOR 5 DAYS   aspirin 325 mg oral tablet: 1 tab(s) orally 2 times a day Please start taking twice daily on 12/10  atorvastatin 80 mg oral tablet: 1 tab(s) orally once a day (at bedtime)  Colace 100 mg oral capsule: 1 cap(s) orally 2 times a day as needed for  constipation  Entresto 49 mg-51 mg oral tablet: 1 tab(s) orally 2 times a day  ezetimibe 10 mg oral tablet: 1 tab(s) orally once a day (in the evening)  levothyroxine 88 mcg (0.088 mg) oral tablet: 1 tab(s) orally once a day (in the morning)  metoprolol succinate 100 mg oral tablet, extended release: 1 tab(s) orally once a day (at bedtime)  naloxone 4 mg/0.1 mL nasal spray: 1 spray(s) intranasally once as needed for somnolence/suspected opioid overdose, may repeat every 3-5 minutes as needed if symptoms occur  ondansetron 4 mg oral tablet: 1 tab(s) orally 4 times a day as needed for  nausea  traMADol 50 mg oral tablet: 1 tab(s) orally every 6 hours as needed for  moderate pain MDD: 4

## 2024-12-09 NOTE — PHYSICAL THERAPY INITIAL EVALUATION ADULT - GAIT TRAINING, PT EVAL
Patient will ambulate x 200 feet independently with RW in 2 weeks in order to increase mobility at home

## 2024-12-09 NOTE — DISCHARGE NOTE NURSING/CASE MANAGEMENT/SOCIAL WORK - PATIENT PORTAL LINK FT
You can access the FollowMyHealth Patient Portal offered by [facility] by registering at the following website: [email or web address]. By joining [facility]s FollowMyHealth portal, you will also be able to view your health information using other applications (apps) compatible with our system.

## 2024-12-09 NOTE — PATIENT PROFILE ADULT - FALL HARM RISK - UNIVERSAL INTERVENTIONS
Bed in lowest position, wheels locked, appropriate side rails in place/Call bell, personal items and telephone in reach/Instruct patient to call for assistance before getting out of bed or chair/Non-slip footwear when patient is out of bed/[place] to call system/Physically safe environment - no spills, clutter or unnecessary equipment/Purposeful Proactive Rounding/Room/bathroom lighting operational, light cord in reach

## 2024-12-09 NOTE — PHYSICAL THERAPY INITIAL EVALUATION ADULT - RANGE OF MOTION EXAMINATION, REHAB EVAL
R Hip Flex: 90/bilateral upper extremity ROM was WNL (within normal limits)/Left LE ROM was WNL (within normal limits)

## 2024-12-09 NOTE — ASU PREOP CHECKLIST - NS PREOP CHK CHLOROHEX WASH
Discharge teaching reviewed with mother, mother verbalizes understanding. Safe sleep reviewed. Follow up appts reviewed, mother denies any questions.   in ASU:

## 2024-12-09 NOTE — PHYSICAL THERAPY INITIAL EVALUATION ADULT - ADDITIONAL COMMENTS
Patient lives in private home +2 JESSI with one railing, then resides on main level. Patient was independent in all ADLs and ambulated independently without device. Patient has tub shower with curtain. Patient has RW.

## 2024-12-09 NOTE — DISCHARGE NOTE PROVIDER - HOSPITAL COURSE
The patient is a 79y Male status post elective total hip Arthroplasty after failing outpatient nonoperative conservative management. Patient presented to [facility] after being medically cleared for an elective surgical procedure. The patient was taken to the operating room on date mentioned above. Prophylactic antibiotics were started before the procedure and continued for 24 hours. There were no complications during the procedure and patient tolerated the procedure well. The patient was transferred to the recovery room in stable condition and subsequently to the surgical floor. The patient was placed on Aspirin 325 BID for anticoagulation while in house. All home medications were continued. The patient received physical therapy daily and daily labs were followed. The dressing was kept clean, dry, intact. The rest of the hospital stay was unremarkable. The patient is a 79y Male status post elective total hip Arthroplasty after failing outpatient nonoperative conservative management. Patient presented to [facility] after being medically cleared for an elective surgical procedure. The patient was taken to the operating room on date mentioned above. Prophylactic antibiotics were started before the procedure and continued for 24 hours. There were no complications during the procedure and patient tolerated the procedure well. The patient was transferred to the recovery room in stable condition and subsequently to the surgical floor. Patient to start aspirin 325 twice daily 12/10. All home medications were continued. The patient received physical therapy and labs were followed. The dressing was kept clean, dry, intact. The rest of the hospital stay was unremarkable. Patient felt comfortable going home early and Dr. Monsivais was in agreement.

## 2024-12-09 NOTE — DISCHARGE NOTE PROVIDER - CARE PROVIDER_API CALL
Lit Monsivais  Orthopaedic Surgery  [address] [place] [id number]  Phone: [phone]  Fax: [phone]  Follow Up Time:

## 2024-12-09 NOTE — DISCHARGE NOTE PROVIDER - NSDCFUADDINST_GEN_ALL_CORE_FT
Discharge Instructions for Total Hip Arthroplasty    1.  Diet: Resume previous diet  2. Activity: WBAT, Rolling walker, Daily PT. Walk plenty.   3. Call with: fever over 101, wound redness, drainage or open area, calf pain/calf swelling  4. Wound Care: Keep Mepilex bandage on until seen in office.  5. OK to Shower with Mepilex; Must be an Mepilex bandage to shower.  Avoid direct water beating on bandage.  Continue ICE packs to hip.  6. DVT PE Prophylaxis: Aspirin 325 twice daily for 30 days. See med rec for further instructions.  7. Follow Up: Dr. Monsivais in office in 10-14 days;  Call to schedule appointment.  8. Pain medications:  If going home, eRX sent to your pharmacy for .

## 2024-12-09 NOTE — PROGRESS NOTE ADULT - SUBJECTIVE AND OBJECTIVE BOX
Orthopaedic PA    Procedure: s/p   R   IRENA  Surgeon: Loi    79y Male comfortable, without complaints.     Denies chest pain, shortness of breath, palpitations, nausea, vomiting, dizziness, fevers, chills    PE:  Vital Signs Last 24 Hrs  T(C): 36.3 (09 Dec 2024 14:09), Max: 36.8 (09 Dec 2024 12:12)  T(F): 97.3 (09 Dec 2024 14:09), Max: 98.2 (09 Dec 2024 12:12)  HR: 68 (09 Dec 2024 14:09) (62 - 69)  BP: 113/67 (09 Dec 2024 14:09) (112/62 - 189/72)  BP(mean): --  RR: 17 (09 Dec 2024 14:09) (11 - 17)  SpO2: 96% (09 Dec 2024 14:09) (95% - 99%)    Parameters below as of 09 Dec 2024 14:09  Patient On (Oxygen Delivery Method): room air      General:  A + O x 3 in NAD    Right Hip: Aquacel  Dressing: C/D/I     Lower Extremity Exam:         5/5 Tibialis Anterior ( dorsiflexion )      5/5 Gastrocsoleus ( plantarflexion )      5/5 Extensor Hallucis Longus ( toe extension )      5/5  Flexor Hallucis Longus ( toe flexion)            Sensation intact to Light touch L2-S1    +2 DP/PT Pulses  Compartments soft and compressible  No calf tenderness bilaterally                          13.3   15.99 )-----------( 207      ( 09 Dec 2024 12:32 )             40.4       12-09    136  |  103  |  17  ----------------------------<  143[H]  4.3   |  27  |  0.88    Ca    8.5      09 Dec 2024 12:32          A/P: 79y Male stable POD# 0 s/p  R  IRENA     - Pain control   - Incentive spirometer  - DVT ppx: SCD / Chemical:  BID  - Ambulation as tolerated  - PT, OT  - F/U AM Labs  - Discharge planning

## 2024-12-09 NOTE — DISCHARGE NOTE NURSING/CASE MANAGEMENT/SOCIAL WORK - FINANCIAL ASSISTANCE
[facility] provides services at a reduced cost to those who are determined to be eligible through [facility]’s financial assistance program. Information regarding [facility]’s financial assistance program can be found by going to [email or web address] or by calling [phone].

## 2024-12-27 LAB
ALBUMIN SERPL ELPH-MCNC: 4.1 G/DL
ALP BLD-CCNC: 102 U/L
ALT SERPL-CCNC: 20 U/L
ANION GAP SERPL CALC-SCNC: 10 MMOL/L
AST SERPL-CCNC: 16 U/L
BILIRUB SERPL-MCNC: 0.4 MG/DL
BUN SERPL-MCNC: 19 MG/DL
CALCIUM SERPL-MCNC: 9.9 MG/DL
CHLORIDE SERPL-SCNC: 98 MMOL/L
CHOLEST SERPL-MCNC: 113 MG/DL
CO2 SERPL-SCNC: 30 MMOL/L
CREAT SERPL-MCNC: 0.87 MG/DL
EGFR: 89 ML/MIN/1.73M2
GLUCOSE SERPL-MCNC: 135 MG/DL
HDLC SERPL-MCNC: 42 MG/DL
LDLC SERPL CALC-MCNC: 51 MG/DL
NONHDLC SERPL-MCNC: 70 MG/DL
POTASSIUM SERPL-SCNC: 4.2 MMOL/L
PROT SERPL-MCNC: 6.7 G/DL
SODIUM SERPL-SCNC: 138 MMOL/L
TRIGL SERPL-MCNC: 105 MG/DL

## 2025-02-27 ENCOUNTER — APPOINTMENT (OUTPATIENT)
Dept: CARDIOLOGY | Facility: CLINIC | Age: 80
End: 2025-02-27
Payer: MEDICARE

## 2025-02-27 VITALS
BODY MASS INDEX: 24.64 KG/M2 | OXYGEN SATURATION: 95 % | WEIGHT: 157 LBS | HEIGHT: 67 IN | SYSTOLIC BLOOD PRESSURE: 136 MMHG | DIASTOLIC BLOOD PRESSURE: 70 MMHG | HEART RATE: 66 BPM

## 2025-02-27 PROBLEM — L30.9 DERMATITIS, UNSPECIFIED: Chronic | Status: ACTIVE | Noted: 2024-11-26

## 2025-02-27 PROBLEM — M25.561 PAIN IN RIGHT KNEE: Chronic | Status: ACTIVE | Noted: 2024-11-26

## 2025-02-27 PROBLEM — M16.11 UNILATERAL PRIMARY OSTEOARTHRITIS, RIGHT HIP: Chronic | Status: ACTIVE | Noted: 2024-11-26

## 2025-02-27 PROBLEM — I21.3 ST ELEVATION (STEMI) MYOCARDIAL INFARCTION OF UNSPECIFIED SITE: Chronic | Status: ACTIVE | Noted: 2024-11-26

## 2025-02-27 PROCEDURE — 99211 OFF/OP EST MAY X REQ PHY/QHP: CPT

## 2025-03-13 ENCOUNTER — APPOINTMENT (OUTPATIENT)
Dept: CARDIOLOGY | Facility: CLINIC | Age: 80
End: 2025-03-13
Payer: MEDICARE

## 2025-03-13 ENCOUNTER — NON-APPOINTMENT (OUTPATIENT)
Age: 80
End: 2025-03-13

## 2025-03-13 VITALS
BODY MASS INDEX: 24.96 KG/M2 | HEART RATE: 64 BPM | OXYGEN SATURATION: 96 % | HEIGHT: 67 IN | WEIGHT: 159 LBS | DIASTOLIC BLOOD PRESSURE: 89 MMHG | SYSTOLIC BLOOD PRESSURE: 135 MMHG

## 2025-03-13 DIAGNOSIS — I70.90 UNSPECIFIED ATHEROSCLEROSIS: ICD-10-CM

## 2025-03-13 DIAGNOSIS — E78.5 HYPERLIPIDEMIA, UNSPECIFIED: ICD-10-CM

## 2025-03-13 DIAGNOSIS — I50.20 UNSPECIFIED SYSTOLIC (CONGESTIVE) HEART FAILURE: ICD-10-CM

## 2025-03-13 DIAGNOSIS — I10 ESSENTIAL (PRIMARY) HYPERTENSION: ICD-10-CM

## 2025-03-13 PROCEDURE — 93000 ELECTROCARDIOGRAM COMPLETE: CPT

## 2025-03-13 PROCEDURE — G2211 COMPLEX E/M VISIT ADD ON: CPT

## 2025-03-13 PROCEDURE — 99214 OFFICE O/P EST MOD 30 MIN: CPT

## 2025-04-27 ENCOUNTER — EMERGENCY (EMERGENCY)
Facility: HOSPITAL | Age: 80
LOS: 1 days | End: 2025-04-27
Attending: STUDENT IN AN ORGANIZED HEALTH CARE EDUCATION/TRAINING PROGRAM | Admitting: STUDENT IN AN ORGANIZED HEALTH CARE EDUCATION/TRAINING PROGRAM
Payer: MEDICARE

## 2025-04-27 VITALS
TEMPERATURE: 98 F | OXYGEN SATURATION: 97 % | DIASTOLIC BLOOD PRESSURE: 74 MMHG | HEART RATE: 71 BPM | RESPIRATION RATE: 19 BRPM | SYSTOLIC BLOOD PRESSURE: 150 MMHG

## 2025-04-27 VITALS
SYSTOLIC BLOOD PRESSURE: 159 MMHG | WEIGHT: 158.07 LBS | HEART RATE: 62 BPM | DIASTOLIC BLOOD PRESSURE: 82 MMHG | RESPIRATION RATE: 16 BRPM | TEMPERATURE: 98 F | OXYGEN SATURATION: 99 % | HEIGHT: 68 IN

## 2025-04-27 DIAGNOSIS — Z98.890 OTHER SPECIFIED POSTPROCEDURAL STATES: Chronic | ICD-10-CM

## 2025-04-27 DIAGNOSIS — Z96.642 PRESENCE OF LEFT ARTIFICIAL HIP JOINT: Chronic | ICD-10-CM

## 2025-04-27 LAB
ALBUMIN SERPL ELPH-MCNC: 3.9 G/DL — SIGNIFICANT CHANGE UP (ref 3.3–5)
ALP SERPL-CCNC: 79 U/L — SIGNIFICANT CHANGE UP (ref 40–120)
ALT FLD-CCNC: 26 U/L — SIGNIFICANT CHANGE UP (ref 12–78)
ANION GAP SERPL CALC-SCNC: 2 MMOL/L — LOW (ref 5–17)
APPEARANCE UR: CLEAR — SIGNIFICANT CHANGE UP
AST SERPL-CCNC: 16 U/L — SIGNIFICANT CHANGE UP (ref 15–37)
BASOPHILS # BLD AUTO: 0.03 K/UL — SIGNIFICANT CHANGE UP (ref 0–0.2)
BASOPHILS NFR BLD AUTO: 0.4 % — SIGNIFICANT CHANGE UP (ref 0–2)
BILIRUB SERPL-MCNC: 0.6 MG/DL — SIGNIFICANT CHANGE UP (ref 0.2–1.2)
BILIRUB UR-MCNC: NEGATIVE — SIGNIFICANT CHANGE UP
BUN SERPL-MCNC: 17 MG/DL — SIGNIFICANT CHANGE UP (ref 7–23)
CALCIUM SERPL-MCNC: 9.4 MG/DL — SIGNIFICANT CHANGE UP (ref 8.5–10.1)
CHLORIDE SERPL-SCNC: 103 MMOL/L — SIGNIFICANT CHANGE UP (ref 96–108)
CO2 SERPL-SCNC: 31 MMOL/L — SIGNIFICANT CHANGE UP (ref 22–31)
COLOR SPEC: YELLOW — SIGNIFICANT CHANGE UP
CREAT SERPL-MCNC: 1 MG/DL — SIGNIFICANT CHANGE UP (ref 0.5–1.3)
DIFF PNL FLD: NEGATIVE — SIGNIFICANT CHANGE UP
EGFR: 78 ML/MIN/1.73M2 — SIGNIFICANT CHANGE UP
EGFR: 78 ML/MIN/1.73M2 — SIGNIFICANT CHANGE UP
EOSINOPHIL # BLD AUTO: 0.21 K/UL — SIGNIFICANT CHANGE UP (ref 0–0.5)
EOSINOPHIL NFR BLD AUTO: 2.8 % — SIGNIFICANT CHANGE UP (ref 0–6)
GLUCOSE SERPL-MCNC: 107 MG/DL — HIGH (ref 70–99)
GLUCOSE UR QL: NEGATIVE MG/DL — SIGNIFICANT CHANGE UP
HCT VFR BLD CALC: 48.3 % — SIGNIFICANT CHANGE UP (ref 39–50)
HGB BLD-MCNC: 15.6 G/DL — SIGNIFICANT CHANGE UP (ref 13–17)
IMM GRANULOCYTES NFR BLD AUTO: 0.1 % — SIGNIFICANT CHANGE UP (ref 0–0.9)
KETONES UR-MCNC: NEGATIVE MG/DL — SIGNIFICANT CHANGE UP
LEUKOCYTE ESTERASE UR-ACNC: NEGATIVE — SIGNIFICANT CHANGE UP
LIDOCAIN IGE QN: 22 U/L — SIGNIFICANT CHANGE UP (ref 13–75)
LYMPHOCYTES # BLD AUTO: 1.28 K/UL — SIGNIFICANT CHANGE UP (ref 1–3.3)
LYMPHOCYTES # BLD AUTO: 17.4 % — SIGNIFICANT CHANGE UP (ref 13–44)
MCHC RBC-ENTMCNC: 27.1 PG — SIGNIFICANT CHANGE UP (ref 27–34)
MCHC RBC-ENTMCNC: 32.3 G/DL — SIGNIFICANT CHANGE UP (ref 32–36)
MCV RBC AUTO: 83.9 FL — SIGNIFICANT CHANGE UP (ref 80–100)
MONOCYTES # BLD AUTO: 0.84 K/UL — SIGNIFICANT CHANGE UP (ref 0–0.9)
MONOCYTES NFR BLD AUTO: 11.4 % — SIGNIFICANT CHANGE UP (ref 2–14)
NEUTROPHILS # BLD AUTO: 5 K/UL — SIGNIFICANT CHANGE UP (ref 1.8–7.4)
NEUTROPHILS NFR BLD AUTO: 67.9 % — SIGNIFICANT CHANGE UP (ref 43–77)
NITRITE UR-MCNC: NEGATIVE — SIGNIFICANT CHANGE UP
NRBC BLD AUTO-RTO: 0 /100 WBCS — SIGNIFICANT CHANGE UP (ref 0–0)
PH UR: 7 — SIGNIFICANT CHANGE UP (ref 5–8)
PLATELET # BLD AUTO: 231 K/UL — SIGNIFICANT CHANGE UP (ref 150–400)
POTASSIUM SERPL-MCNC: 4.7 MMOL/L — SIGNIFICANT CHANGE UP (ref 3.5–5.3)
POTASSIUM SERPL-SCNC: 4.7 MMOL/L — SIGNIFICANT CHANGE UP (ref 3.5–5.3)
PROT SERPL-MCNC: 7.2 G/DL — SIGNIFICANT CHANGE UP (ref 6–8.3)
PROT UR-MCNC: NEGATIVE MG/DL — SIGNIFICANT CHANGE UP
RBC # BLD: 5.76 M/UL — SIGNIFICANT CHANGE UP (ref 4.2–5.8)
RBC # FLD: 13.2 % — SIGNIFICANT CHANGE UP (ref 10.3–14.5)
SODIUM SERPL-SCNC: 136 MMOL/L — SIGNIFICANT CHANGE UP (ref 135–145)
SP GR SPEC: 1.01 — SIGNIFICANT CHANGE UP (ref 1–1.03)
UROBILINOGEN FLD QL: 0.2 MG/DL — SIGNIFICANT CHANGE UP (ref 0.2–1)
WBC # BLD: 7.37 K/UL — SIGNIFICANT CHANGE UP (ref 3.8–10.5)
WBC # FLD AUTO: 7.37 K/UL — SIGNIFICANT CHANGE UP (ref 3.8–10.5)

## 2025-04-27 PROCEDURE — 83690 ASSAY OF LIPASE: CPT

## 2025-04-27 PROCEDURE — 36415 COLL VENOUS BLD VENIPUNCTURE: CPT

## 2025-04-27 PROCEDURE — 81003 URINALYSIS AUTO W/O SCOPE: CPT

## 2025-04-27 PROCEDURE — 74176 CT ABD & PELVIS W/O CONTRAST: CPT | Mod: MC

## 2025-04-27 PROCEDURE — 80053 COMPREHEN METABOLIC PANEL: CPT

## 2025-04-27 PROCEDURE — 96375 TX/PRO/DX INJ NEW DRUG ADDON: CPT

## 2025-04-27 PROCEDURE — 96374 THER/PROPH/DIAG INJ IV PUSH: CPT

## 2025-04-27 PROCEDURE — 85025 COMPLETE CBC W/AUTO DIFF WBC: CPT

## 2025-04-27 PROCEDURE — 74176 CT ABD & PELVIS W/O CONTRAST: CPT | Mod: 26

## 2025-04-27 PROCEDURE — 99285 EMERGENCY DEPT VISIT HI MDM: CPT

## 2025-04-27 PROCEDURE — 99284 EMERGENCY DEPT VISIT MOD MDM: CPT | Mod: 25

## 2025-04-27 RX ORDER — DIAZEPAM 2 MG/1
1 TABLET ORAL
Qty: 14 | Refills: 0
Start: 2025-04-27 | End: 2025-05-03

## 2025-04-27 RX ORDER — DIAZEPAM 2 MG/1
5 TABLET ORAL ONCE
Refills: 0 | Status: DISCONTINUED | OUTPATIENT
Start: 2025-04-27 | End: 2025-04-27

## 2025-04-27 RX ORDER — HYDROMORPHONE/SOD CHLOR,ISO/PF 2 MG/10 ML
1 SYRINGE (ML) INJECTION ONCE
Refills: 0 | Status: DISCONTINUED | OUTPATIENT
Start: 2025-04-27 | End: 2025-04-27

## 2025-04-27 RX ORDER — LIDOCAINE HYDROCHLORIDE 20 MG/ML
1 JELLY TOPICAL ONCE
Refills: 0 | Status: COMPLETED | OUTPATIENT
Start: 2025-04-27 | End: 2025-04-27

## 2025-04-27 RX ORDER — ONDANSETRON HCL/PF 4 MG/2 ML
4 VIAL (ML) INJECTION ONCE
Refills: 0 | Status: COMPLETED | OUTPATIENT
Start: 2025-04-27 | End: 2025-04-27

## 2025-04-27 RX ADMIN — Medication 1 MILLIGRAM(S): at 16:54

## 2025-04-27 RX ADMIN — Medication 1000 MILLILITER(S): at 16:19

## 2025-04-27 RX ADMIN — Medication 1 MILLIGRAM(S): at 18:58

## 2025-04-27 RX ADMIN — Medication 4 MILLIGRAM(S): at 16:19

## 2025-04-27 RX ADMIN — DIAZEPAM 5 MILLIGRAM(S): 2 TABLET ORAL at 19:02

## 2025-04-27 RX ADMIN — LIDOCAINE HYDROCHLORIDE 1 PATCH: 20 JELLY TOPICAL at 19:02

## 2025-04-27 RX ADMIN — Medication 4 MILLIGRAM(S): at 16:45

## 2025-04-27 NOTE — ED ADULT NURSE NOTE - OBJECTIVE STATEMENT
Pt. received alert and oriented x4 with chief complaint of intermittent left flank pain now w/ pain radiating to penile area w/ urinary urgency.

## 2025-04-27 NOTE — ED PROVIDER NOTE - OBJECTIVE STATEMENT
79-year-old male past medical history of CAD status post 5 stents, CHF, thyroid disease, hypertension hld presents to the ED with complaints of left flank pain since yesterday and right groin pain, worsened today.  Reports urinary urgency today but denies hematuria, dysuria.  He denies any fever or chills, nausea or vomiting, chest pain, shortness of breath, URI symptoms or all other complaints.  Patient reports he took naproxen 375 and Flexeril at noon, he thought it was his musculoskeletal back pain

## 2025-04-27 NOTE — ED PROVIDER NOTE - PATIENT PORTAL LINK FT
You can access the FollowMyHealth Patient Portal offered by [facility] by registering at the following website: [email or web address]. By joining [facility]’s FollowMyHealth portal, you will also be able to view your health information using other applications (apps) compatible with our system.

## 2025-04-27 NOTE — ED PROVIDER NOTE - NSFOLLOWUPINSTRUCTIONS_ED_ALL_ED_FT
WHAT YOU NEED TO KNOW:  You can take Tylenol and Motrin every 8 hours for pain as needed.     You can also take valium as needed, 5mg, every 12 hours, do not drive when taking this medication.     What do I need to know about abdominal pain? Abdominal pain may be felt anywhere between the bottom of your rib cage and your groin. Acute pain usually lasts less than 3 months. Chronic pain lasts longer than 3 months. Your pain may be sharp or dull. The pain may stay in the same place or move around. You may have the pain all the time, or it may come and go. Depending on the cause, you may also have nausea, vomiting, fever, or diarrhea.  Abdominal Organs    What causes abdominal pain? The cause may not be found. The following are common causes:    Overeating, gas pains, or food poisoning    Constipation or diarrhea    An injury    Appendicitis, a hernia, or an ulcer    Infection or a blockage    A liver, gallbladder, or kidney condition  How is the cause of abdominal pain diagnosed? Your healthcare provider will check your abdomen. He or she will ask where your pain is and when it started. Tell him or her if the pain wakes you or stops you from doing your daily activities. Describe anything that makes the pain better or worse. You may also need any of the following:    Blood, urine, or bowel movement samples may be tested for signs of an infection, disease, or injury.    X-ray pictures of your abdomen may show an injury or other cause of the pain.  How is abdominal pain treated?    Prescription pain medicine may be given. Ask your healthcare provider how to take this medicine safely. Some prescription pain medicines contain acetaminophen. Do not take other medicines that contain acetaminophen without talking to your healthcare provider. Too much acetaminophen may cause liver damage. Prescription pain medicine may cause constipation. Ask your healthcare provider how to prevent or treat constipation.    Medicines may be given to calm your stomach or prevent vomiting.    Relaxation therapy may be used along with pain medicine.    Surgery may be needed, depending on the cause.  What can I do to manage or prevent abdominal pain?    Apply heat on your abdomen for 20 to 30 minutes every 2 hours for as many days as directed. Heat helps decrease pain and muscle spasms.    Make changes to the foods you eat, if needed. Do not eat foods that cause abdominal pain or other symptoms. Eat small meals more often. The following changes may also help:  Eat more high-fiber foods if you are constipated. High-fiber foods include fruits, vegetables, whole-grain foods, and legumes such as khan beans.        Do not eat foods that cause gas if you have bloating. Examples include broccoli, cabbage, beans, and carbonated drinks.    Do not eat foods or drinks that contain sorbitol or fructose if you have diarrhea and bloating. Some examples are fruit juices, candy, jelly, and sugar-free gum.    Do not eat high-fat foods. Examples include fried foods, cheeseburgers, hot dogs, and desserts.    Make changes to the liquids you drink, if needed. Do not drink liquids that cause pain or make it worse, such as orange juice. Drink liquids throughout the day to stay hydrated. The following changes may also help:  Drink more liquids to prevent dehydration from diarrhea or vomiting. Ask your healthcare provider how much liquid to drink each day and which liquids are best for you.    Limit or do not have caffeine. Caffeine may make symptoms such as heartburn or nausea worse.    Limit or do not drink alcohol. Alcohol can make your abdominal pain worse. Ask your healthcare provider if it is okay for you to drink alcohol. Also ask how much is okay for you to drink. A drink of alcohol is 12 ounces of beer, ½ ounce of liquor, or 5 ounces of wine.    Keep a diary of your abdominal pain. A diary may help your healthcare provider learn what is causing your pain. Include when the pain happens, how long it lasts, and what the pain feels like. Write down any other symptoms you have with abdominal pain. Also write down what you eat, and any symptoms you have after you eat.    Manage stress. Stress may cause abdominal pain. Your healthcare provider may recommend relaxation techniques and deep breathing exercises to help decrease your stress. Your healthcare provider may recommend you talk to someone about your stress or anxiety, such as a counselor or a friend. Get plenty of sleep. Exercise regularly.  [employer] Family Walking for Exercise      Do not smoke. Nicotine and other chemicals in cigarettes can damage your esophagus and stomach. Ask your healthcare provider for information if you currently smoke and need help to quit. E-cigarettes or smokeless tobacco still contain nicotine. Talk to your healthcare provider before you use these products.  Call your local emergency number (911 in the [place]) if:    You have chest pain or shortness of breath.    When should I seek immediate care?    You have pulsing pain in your upper abdomen or lower back that suddenly becomes constant.    Your pain is in the right lower abdominal area and worsens with movement.    You have a fever over 100.4°F (38°C) or shaking chills.    You are vomiting and cannot keep food or liquids down.    Your pain does not improve or gets worse over the next 8 to 12 hours.    You see blood in your vomit or bowel movements, or they look black and tarry.    Your skin or the whites of your eyes turn yellow.    You are a woman and have a large amount of vaginal bleeding that is not your monthly period.  When should I call my doctor?    You have pain in your lower back.    You are a man and have pain in your testicles.    You have pain when you urinate.    You have questions or concerns about your condition or care.  CARE AGREEMENT:    You have the right to help plan your care. Learn about your health condition and how it may be treated. Discuss treatment options with your healthcare providers to decide what care you want to receive. You always have the right to refuse treatment.

## 2025-04-27 NOTE — ED PROVIDER NOTE - ATTENDING APP SHARED VISIT CONTRIBUTION OF CARE
Raghavendra ATTG See MDM I performed a history and physical exam of the patient and discussed their management with the Physician assistant reviewed the PAs note and agree with the documented findings and plan of care. My medical decision making and observations are found above.

## 2025-04-27 NOTE — ED PROVIDER NOTE - IV ALTEPLASE EXCL REL HIDDEN
Patient is a 51y old  Male who presents with a chief complaint of Pulmonary embolism (02 Feb 2020 11:10)      HPI:  PMHx: HTN and HLD    CC: This is a 51 year old male who presented to the ED after outpatient imaging was notable fo pulmonary emboli.     HPI: The patient has been experiencing shortness of breath for approximately 1 month. He is dyspneic at rest, but has noticed he can only walk around 1-2 blocks before becoming significantly short of breath and is unable to tolerate stairs. He has never experienced similar symptoms in the past. He does not live a sedentary lifestyle. His symptoms are only associated with mild chest pain.     In the ED, the patient remained hemodynamically stable without hypoxia. His labs were WNL. (31 Jan 2020 17:49) patient has been seeing cardio and vascular as op, had chest ct to evaluate aorta found segmental PE lingula intermittent CP L SIDED      PAST MEDICAL & SURGICAL HISTORY:  Peripheral neuropathy  Cerebrovascular accident (CVA)  Hyperlipidemia  Hypertension  S/P spinal fusion: Lumbar      SOCIAL HX:   Smoking   -    FAMILY HISTORY:  FH: coronary arteriosclerosis: Mother  FH: CVA (cerebrovascular accident): Father; Blood clot      REVIEW OF SYSTEMS see hpi      Allergies    morphine (Unknown)    Intolerances        aspirin enteric coated 81 milliGRAM(s) Oral daily  atorvastatin 20 milliGRAM(s) Oral at bedtime  chlorhexidine 4% Liquid 1 Application(s) Topical <User Schedule>  DULoxetine 60 milliGRAM(s) Oral daily  enoxaparin Injectable 120 milliGRAM(s) SubCutaneous every 12 hours  losartan 25 milliGRAM(s) Oral daily  multivitamin 1 Tablet(s) Oral daily  : Home Meds:      PHYSICAL EXAM    ICU Vital Signs Last 24 Hrs  T(C): 36.3 (02 Feb 2020 05:59), Max: 36.3 (01 Feb 2020 20:52)  T(F): 97.4 (02 Feb 2020 05:59), Max: 97.4 (02 Feb 2020 05:59)  HR: 75 (02 Feb 2020 05:59) (67 - 75)  BP: 105/61 (02 Feb 2020 05:59) (100/60 - 126/68)-  RR: 19 (02 Feb 2020 05:59) (18 - 19)  SpO2: 96% (02 Feb 2020 08:00) (96% - 96%)      General: comfortable at rest  HEENT:  [place]              Lymph Nodes: No cervical LN   Lungs: Bilateral BS  Cardiovascular: Regular  Abdomen: Soft, Positive BS  Extremities: No clubbing  Skin: Warm  Neurological: Non focal       02-01-20 @ 07:01  -  02-02-20 @ 07:00  --------------------------------------------------------  IN:    Oral Fluid: 550 mL  Total IN: 550 mL    OUT:  Total OUT: 0 mL    Total NET: 550 mL      02-02-20 @ 07:01  -  02-02-20 @ 13:05  --------------------------------------------------------  IN:    Oral Fluid: 100 mL  Total IN: 100 mL    OUT:  Total OUT: 0 mL    Total NET: 100 mL          LABS:                          15.3   6.59  )-----------( 161      ( 02 Feb 2020 05:43 )             44.9                                               02-02    141  |  106  |  14  ----------------------------<  104<H>  4.1   |  23  |  1.0    Ca    9.4      02 Feb 2020 05:43  Mg     1.9     01-31    TPro  6.5  /  Alb  3.9  /  TBili  0.8  /  DBili  x   /  AST  32  /  ALT  41  /  AlkPhos  99  02-02      PT/INR - ( 31 Jan 2020 14:55 )   PT: 10.40 sec;   INR: 0.90 ratio         PTT - ( 31 Jan 2020 14:55 )  PTT:30.8 sec                                           CARDIAC MARKERS ( 02 Feb 2020 05:43 )  x     / <0.01 ng/mL / x     / x     / x      CARDIAC MARKERS ( 31 Jan 2020 14:55 )  x     / <0.01 ng/mL / x     / x     / x                                                LIVER FUNCTIONS - ( 02 Feb 2020 05:43 )  Alb: 3.9 g/dL / Pro: 6.5 g/dL / ALK PHOS: 99 U/L / ALT: 41 U/L / AST: 32 U/L / GGT: x                                                                                                                                       X-Rays  / chest ct reviewed    MEDICATIONS  (STANDING):  aspirin enteric coated 81 milliGRAM(s) Oral daily  atorvastatin 20 milliGRAM(s) Oral at bedtime  chlorhexidine 4% Liquid 1 Application(s) Topical <User Schedule>  DULoxetine 60 milliGRAM(s) Oral daily  enoxaparin Injectable 120 milliGRAM(s) SubCutaneous every 12 hours  losartan 25 milliGRAM(s) Oral daily  multivitamin 1 Tablet(s) Oral daily    MEDICATIONS  (PRN):
CHIEF COMPLAINT:Patient is a 51y old  Male who presents with a chief complaint of SOB.  Being followed for CAD with some LIZARRAGA and CP.    Had CT chest done by vascular wtih noted PE>     Describes recent SOB as well.  We noted He was started on lovenox for now.     CE have been negative BNP nl as well.     HISTORY OF PRESENT ILLNESS:   HPI:    PAST MEDICAL & SURGICAL HISTORY:    FAMILY HISTORY:    Allergies    morphine (Unknown)    Intolerances    	  Home Medications:    MEDICATIONS  (STANDING):    MEDICATIONS  (PRN):        SOCIAL HISTORY:    [ ] Non-smoker  [ ] Smoker  [ ] Alcohol      REVIEW OF SYSTEMS:    PHYSICAL EXAM:  T(C): 35.9 (01-31-20 @ 16:00), Max: 36.6 (01-31-20 @ 12:57)  HR: 73 (01-31-20 @ 16:00) (73 - 79)  BP: 119/66 (01-31-20 @ 16:00) (119/66 - 158/70)  RR: 18 (01-31-20 @ 16:00) (18 - 20)  SpO2: 97% (01-31-20 @ 16:00) (97% - 100%)  Wt(kg): --  I&O's Summary    Daily     Daily     General Appearance: Normal	  Cardiovascular: Normal S1 S2, No JVD, No murmurs, No edema  Respiratory: Lungs clear to auscultation	  Psychiatry: A & O x 3, Mood & affect appropriate  Gastrointestinal:  Soft, Non-tender  Skin: No rashes, No ecchymoses, No cyanosis	  Neurologic: Non-focal  Extremities: Normal range of motion, No clubbing, cyanosis or edema  Vascular: Peripheral pulses palpable 2+ bilaterally        LABS:	 	                        15.8   7.80  )-----------( 178      ( 31 Jan 2020 14:55 )             46.7     01-31    140  |  104  |  20  ----------------------------<  98  4.6   |  23  |  1.0    Ca    9.8      31 Jan 2020 14:55  Mg     1.9     01-31    TPro  6.9  /  Alb  4.2  /  TBili  0.5  /  DBili  x   /  AST  31  /  ALT  42<H>  /  AlkPhos  97  01-31      proBNP: Serum Pro-Brain Natriuretic Peptide: 9 pg/mL (01-31 @ 14:55)    Lipid Profile:   HgA1c:   TSH:       CARDIAC MARKERS:Troponin T, Serum: <0.01 ng/mL (01.31.20 @ 14:55)                TELEMETRY EVENTS: 	    ECG:  	< from: 12 Lead ECG (01.31.20 @ 14:58) >  Ventricular Rate 75 BPM    Atrial Rate 75 BPM    P-R Interval 180 ms    QRS Duration 100 ms    Q-T Interval 390 ms    QTC Calculation(Bezet) 435 ms    P Axis 71 degrees    R Axis 26 degrees    T Axis 54 degrees    Diagnosis Line Normal sinus rhythm  Nonspecific ST abnormality  Abnormal ECG    < end of copied text >    RADIOLOGY:  	    PREVIOUS DIAGNOSTIC TESTING:    [ ] Echocardiogram:  [ ]  Catheterization:  CT < from: CT Angio Chest w/ IV Cont (01.08.20 @ 17:28) >  FINDINGS:    AIRWAYS, LUNGS AND PLEURA: The central tracheobronchial tree is patent. There is no pleural effusion.The lungs are clear. There is no pneumothorax. There are bilateral perifissural nodules which probably are benign intraparenchymal lymph nodes (series 4 image 149 and 125).    MEDIASTINUM: There are no enlarged mediastinal, hilar or axillary lymph nodes. The visualized portion of the thyroid gland is heterogeneous.    HEART AND VESSELS: The heart is normal in size. There is small pericardial fluid . The mid ascending thoracic aorta measures 3.8 cm. Filling defects seen within lingular segmental pulmonary arteries    UPPER ABDOMEN: Unremarkable    BONES/SOFT TISSUES: There are degenerative changes of the spine.     IMPRESSION:  Filling defects seen within lingular segmental pulmonary arteries consistent with pulmonary embolism.      < end of copied text >
show

## 2025-04-27 NOTE — ED PROVIDER NOTE - CLINICAL SUMMARY MEDICAL DECISION MAKING FREE TEXT BOX
79-year-old male past medical history of CAD status post 5 stents, CHF, thyroid disease, hypertension hld presents to the ED with complaints of left flank pain since yesterday and right groin pain, worsened today.  Reports urinary urgency today but denies hematuria, dysuria.  He denies any fever or chills, nausea or vomiting, chest pain, shortness of breath, URI symptoms or all other complaints.  Patient reports he took naproxen 375 and Flexeril at noon, he thought it was his musculoskeletal back pain    pt has point tenderness along the left back, paraspinal no rash noted, hard knob, appears muscle spasm like explained to pt such will give muscle relaxant, dispo w/ pcp fu

## 2025-04-27 NOTE — ED PROVIDER NOTE - DISPOSITION TYPE
Unable to wean patient off of 3 L o2 n/c during this shift. Galileo NP ordered STAT CBC. Will continue to monitor.    DISCHARGE

## 2025-04-29 ENCOUNTER — OUTPATIENT (OUTPATIENT)
Dept: OUTPATIENT SERVICES | Facility: HOSPITAL | Age: 80
LOS: 1 days | End: 2025-04-29
Payer: MEDICARE

## 2025-04-29 VITALS
HEIGHT: 67.5 IN | HEART RATE: 68 BPM | TEMPERATURE: 97 F | RESPIRATION RATE: 16 BRPM | DIASTOLIC BLOOD PRESSURE: 94 MMHG | SYSTOLIC BLOOD PRESSURE: 158 MMHG | WEIGHT: 162.04 LBS | OXYGEN SATURATION: 96 %

## 2025-04-29 DIAGNOSIS — Z98.890 OTHER SPECIFIED POSTPROCEDURAL STATES: Chronic | ICD-10-CM

## 2025-04-29 DIAGNOSIS — Z95.5 PRESENCE OF CORONARY ANGIOPLASTY IMPLANT AND GRAFT: Chronic | ICD-10-CM

## 2025-04-29 DIAGNOSIS — Z96.641 PRESENCE OF RIGHT ARTIFICIAL HIP JOINT: Chronic | ICD-10-CM

## 2025-04-29 DIAGNOSIS — Z01.818 ENCOUNTER FOR OTHER PREPROCEDURAL EXAMINATION: ICD-10-CM

## 2025-04-29 DIAGNOSIS — M17.11 UNILATERAL PRIMARY OSTEOARTHRITIS, RIGHT KNEE: ICD-10-CM

## 2025-04-29 DIAGNOSIS — Z86.79 PERSONAL HISTORY OF OTHER DISEASES OF THE CIRCULATORY SYSTEM: ICD-10-CM

## 2025-04-29 DIAGNOSIS — E03.9 HYPOTHYROIDISM, UNSPECIFIED: ICD-10-CM

## 2025-04-29 DIAGNOSIS — Z95.5 PRESENCE OF CORONARY ANGIOPLASTY IMPLANT AND GRAFT: ICD-10-CM

## 2025-04-29 DIAGNOSIS — I25.10 ATHEROSCLEROTIC HEART DISEASE OF NATIVE CORONARY ARTERY WITHOUT ANGINA PECTORIS: ICD-10-CM

## 2025-04-29 DIAGNOSIS — Z96.642 PRESENCE OF LEFT ARTIFICIAL HIP JOINT: Chronic | ICD-10-CM

## 2025-04-29 DIAGNOSIS — I25.10 ATHEROSCLEROTIC HEART DISEASE OF NATIVE CORONARY ARTERY WITHOUT ANGINA PECTORIS: Chronic | ICD-10-CM

## 2025-04-29 LAB
ANION GAP SERPL CALC-SCNC: 4 MMOL/L — LOW (ref 5–17)
BUN SERPL-MCNC: 18 MG/DL — SIGNIFICANT CHANGE UP (ref 7–23)
CALCIUM SERPL-MCNC: 10 MG/DL — SIGNIFICANT CHANGE UP (ref 8.5–10.1)
CHLORIDE SERPL-SCNC: 101 MMOL/L — SIGNIFICANT CHANGE UP (ref 96–108)
CO2 SERPL-SCNC: 34 MMOL/L — HIGH (ref 22–31)
CREAT SERPL-MCNC: 0.89 MG/DL — SIGNIFICANT CHANGE UP (ref 0.5–1.3)
EGFR: 88 ML/MIN/1.73M2 — SIGNIFICANT CHANGE UP
EGFR: 88 ML/MIN/1.73M2 — SIGNIFICANT CHANGE UP
GLUCOSE SERPL-MCNC: 85 MG/DL — SIGNIFICANT CHANGE UP (ref 70–99)
HCT VFR BLD CALC: 47.8 % — SIGNIFICANT CHANGE UP (ref 39–50)
HGB BLD-MCNC: 15.5 G/DL — SIGNIFICANT CHANGE UP (ref 13–17)
MCHC RBC-ENTMCNC: 27.4 PG — SIGNIFICANT CHANGE UP (ref 27–34)
MCHC RBC-ENTMCNC: 32.4 G/DL — SIGNIFICANT CHANGE UP (ref 32–36)
MCV RBC AUTO: 84.5 FL — SIGNIFICANT CHANGE UP (ref 80–100)
NRBC BLD AUTO-RTO: 0 /100 WBCS — SIGNIFICANT CHANGE UP (ref 0–0)
PLATELET # BLD AUTO: 239 K/UL — SIGNIFICANT CHANGE UP (ref 150–400)
POTASSIUM SERPL-MCNC: 4.9 MMOL/L — SIGNIFICANT CHANGE UP (ref 3.5–5.3)
POTASSIUM SERPL-SCNC: 4.9 MMOL/L — SIGNIFICANT CHANGE UP (ref 3.5–5.3)
RBC # BLD: 5.66 M/UL — SIGNIFICANT CHANGE UP (ref 4.2–5.8)
RBC # FLD: 13.3 % — SIGNIFICANT CHANGE UP (ref 10.3–14.5)
SODIUM SERPL-SCNC: 139 MMOL/L — SIGNIFICANT CHANGE UP (ref 135–145)
WBC # BLD: 8.5 K/UL — SIGNIFICANT CHANGE UP (ref 3.8–10.5)
WBC # FLD AUTO: 8.5 K/UL — SIGNIFICANT CHANGE UP (ref 3.8–10.5)

## 2025-04-29 PROCEDURE — 87640 STAPH A DNA AMP PROBE: CPT

## 2025-04-29 PROCEDURE — 93010 ELECTROCARDIOGRAM REPORT: CPT

## 2025-04-29 PROCEDURE — 86901 BLOOD TYPING SEROLOGIC RH(D): CPT

## 2025-04-29 PROCEDURE — 86850 RBC ANTIBODY SCREEN: CPT

## 2025-04-29 PROCEDURE — 93005 ELECTROCARDIOGRAM TRACING: CPT

## 2025-04-29 PROCEDURE — 36415 COLL VENOUS BLD VENIPUNCTURE: CPT

## 2025-04-29 PROCEDURE — 87641 MR-STAPH DNA AMP PROBE: CPT

## 2025-04-29 PROCEDURE — G0463: CPT

## 2025-04-29 PROCEDURE — 86900 BLOOD TYPING SEROLOGIC ABO: CPT

## 2025-04-29 PROCEDURE — 85027 COMPLETE CBC AUTOMATED: CPT

## 2025-04-29 PROCEDURE — 80048 BASIC METABOLIC PNL TOTAL CA: CPT

## 2025-04-29 RX ORDER — ASPIRIN 325 MG
1 TABLET ORAL
Refills: 0 | DISCHARGE

## 2025-04-29 RX ORDER — SACUBITRIL AND VALSARTAN 6; 6 MG/1; MG/1
1 PELLET ORAL
Refills: 0 | DISCHARGE

## 2025-04-29 NOTE — H&P PST ADULT - NSSUBSTANCEUSE_GEN_ALL_CORE_SD
Denies Illicit Drug Use/caffeine Thalidomide Counseling: I discussed with the patient the risks of thalidomide including but not limited to birth defects, anxiety, weakness, chest pain, dizziness, cough and severe allergy.

## 2025-04-29 NOTE — H&P PST ADULT - NSICDXPASTMEDICALHX_GEN_ALL_CORE_FT
PAST MEDICAL HISTORY:  Bilateral knee pain     Colitis     Facial eczema     History of heart failure     Hypothyroid     Osteoarthritis of right knee     STEMI (ST elevation myocardial infarction)     Unilateral primary osteoarthritis, right hip      PAST MEDICAL HISTORY:  Benign essential HTN     Bilateral knee pain     Colitis     Facial eczema     HFrEF (heart failure with reduced ejection fraction)     HLD (hyperlipidemia)     Hypothyroid     Osteoarthritis of right knee     STEMI (ST elevation myocardial infarction)     Unilateral primary osteoarthritis, right hip

## 2025-04-29 NOTE — H&P PST ADULT - NS MD HP INPLANTS MED DEV
bilateral hip joint  H/O 5 cardiac stents placed October 2023/Artificial joint/Vascular stents/Clips

## 2025-04-29 NOTE — H&P PST ADULT - HISTORY OF PRESENT ILLNESS
79 year old male PMH MI October 2023 H/O total of 5 cardiac stents placed October 2023 [place]), Heart Failure, Hypothyroidism, Facial Eczema, Colitis, Bilateral Knee Pain , Unilateral Primary Osteoarthritis RIGHT Hip s/p  RIGHT Total Hip Replacement with Dr Lit Monsivais on 12/9/2024. Pt c/o right knee pain x 2years (9/10) orthopedic consult- s/p right knee -X ay demonstrated primary osteoarthritis right knee. Pt scheduled for Right total knee replacement on 5/5/25  **Denies any CP/SOB, recent fall/injury or sick contacts   79 year old male PMH HTN, HLD,  STEMI , s/p DWAINE x3 to the LAD and DWAINE x 2 to LCx (October 2023) HFrEF( Last TTE in 4/2024, EF 62% ), Hypothyroidism, Facial Eczema, Colitis, Bilateral Knee Pain , Unilateral Primary Osteoarthritis RIGHT Hip s/p  RIGHT Total Hip Replacement with Dr Lit Monsivais on 12/9/2024.   Pt c/o right knee pain x 2years (9/10) orthopedic consult- s/p right knee -X ay demonstrated primary osteoarthritis right knee. Pt scheduled for Right total knee replacement on 5/5/25  **Denies any CP/SOB, recent fall/injury or sick contacts  Last cardiology eval in 3/2025

## 2025-04-29 NOTE — H&P PST ADULT - PROBLEM SELECTOR PLAN 1
Right total knee replacement on 5/5/25  Labs- CBC, BMP, T&S, MRSA/MSSA, EKG  Pre op instructions discussed including skin prep- verbalized understanding  Pre op PCP & card evaluation

## 2025-04-29 NOTE — H&P PST ADULT - NSICDXPASTSURGICALHX_GEN_ALL_CORE_FT
PAST SURGICAL HISTORY:  CAD (coronary artery disease)     H/O colonoscopy     History of left hip replacement     S/P hip replacement, right     Stented coronary artery

## 2025-04-30 LAB
MRSA PCR RESULT.: SIGNIFICANT CHANGE UP
S AUREUS DNA NOSE QL NAA+PROBE: DETECTED

## 2025-04-30 RX ORDER — MUPIROCIN CALCIUM 20 MG/G
1 CREAM TOPICAL
Qty: 1 | Refills: 0
Start: 2025-04-30 | End: 2025-05-04

## 2025-05-01 ENCOUNTER — NON-APPOINTMENT (OUTPATIENT)
Age: 80
End: 2025-05-01

## 2025-05-01 PROBLEM — Z86.79 PERSONAL HISTORY OF OTHER DISEASES OF THE CIRCULATORY SYSTEM: Chronic | Status: INACTIVE | Noted: 2024-11-26 | Resolved: 2025-04-30

## 2025-05-21 ENCOUNTER — NON-APPOINTMENT (OUTPATIENT)
Age: 80
End: 2025-05-21

## 2025-05-30 NOTE — ASU PATIENT PROFILE, ADULT - NSICDXPASTMEDICALHX_GEN_ALL_CORE_FT
PAST MEDICAL HISTORY:  Benign essential HTN     Bilateral knee pain     Colitis     Facial eczema     HFrEF (heart failure with reduced ejection fraction)     HLD (hyperlipidemia)     Hypothyroid     Osteoarthritis of right knee     STEMI (ST elevation myocardial infarction)     Unilateral primary osteoarthritis, right hip

## 2025-05-30 NOTE — ASU PATIENT PROFILE, ADULT - ARRIVAL TIME
10:30
Detail Level: Detailed
Quality 402: Tobacco Use And Help With Quitting Among Adolescents: Patient screened for tobacco and never smoked
Quality 431: Preventive Care And Screening: Unhealthy Alcohol Use - Screening: Patient screened for unhealthy alcohol use using a single question and scores less than 2 times per year
Quality 130: Documentation Of Current Medications In The Medical Record: Current Medications Documented

## 2025-06-02 ENCOUNTER — RESULT REVIEW (OUTPATIENT)
Age: 80
End: 2025-06-02

## 2025-06-02 ENCOUNTER — TRANSCRIPTION ENCOUNTER (OUTPATIENT)
Age: 80
End: 2025-06-02

## 2025-06-02 ENCOUNTER — OUTPATIENT (OUTPATIENT)
Dept: OUTPATIENT SERVICES | Facility: HOSPITAL | Age: 80
LOS: 1 days | End: 2025-06-02
Payer: MEDICARE

## 2025-06-02 VITALS
DIASTOLIC BLOOD PRESSURE: 91 MMHG | OXYGEN SATURATION: 97 % | HEART RATE: 58 BPM | TEMPERATURE: 98 F | HEIGHT: 67.5 IN | SYSTOLIC BLOOD PRESSURE: 147 MMHG | RESPIRATION RATE: 12 BRPM | WEIGHT: 162.04 LBS

## 2025-06-02 DIAGNOSIS — M17.11 UNILATERAL PRIMARY OSTEOARTHRITIS, RIGHT KNEE: ICD-10-CM

## 2025-06-02 DIAGNOSIS — Z96.642 PRESENCE OF LEFT ARTIFICIAL HIP JOINT: Chronic | ICD-10-CM

## 2025-06-02 DIAGNOSIS — Z96.641 PRESENCE OF RIGHT ARTIFICIAL HIP JOINT: Chronic | ICD-10-CM

## 2025-06-02 DIAGNOSIS — I10 ESSENTIAL (PRIMARY) HYPERTENSION: ICD-10-CM

## 2025-06-02 DIAGNOSIS — I25.10 ATHEROSCLEROTIC HEART DISEASE OF NATIVE CORONARY ARTERY WITHOUT ANGINA PECTORIS: Chronic | ICD-10-CM

## 2025-06-02 DIAGNOSIS — I25.10 ATHEROSCLEROTIC HEART DISEASE OF NATIVE CORONARY ARTERY WITHOUT ANGINA PECTORIS: ICD-10-CM

## 2025-06-02 DIAGNOSIS — I50.32 CHRONIC DIASTOLIC (CONGESTIVE) HEART FAILURE: ICD-10-CM

## 2025-06-02 DIAGNOSIS — M19.90 UNSPECIFIED OSTEOARTHRITIS, UNSPECIFIED SITE: ICD-10-CM

## 2025-06-02 DIAGNOSIS — Z98.890 OTHER SPECIFIED POSTPROCEDURAL STATES: Chronic | ICD-10-CM

## 2025-06-02 DIAGNOSIS — I50.22 CHRONIC SYSTOLIC (CONGESTIVE) HEART FAILURE: ICD-10-CM

## 2025-06-02 DIAGNOSIS — Z95.5 PRESENCE OF CORONARY ANGIOPLASTY IMPLANT AND GRAFT: Chronic | ICD-10-CM

## 2025-06-02 LAB
ANION GAP SERPL CALC-SCNC: 3 MMOL/L — LOW (ref 5–17)
BUN SERPL-MCNC: 14 MG/DL — SIGNIFICANT CHANGE UP (ref 7–23)
CALCIUM SERPL-MCNC: 8.6 MG/DL — SIGNIFICANT CHANGE UP (ref 8.5–10.1)
CHLORIDE SERPL-SCNC: 103 MMOL/L — SIGNIFICANT CHANGE UP (ref 96–108)
CO2 SERPL-SCNC: 31 MMOL/L — SIGNIFICANT CHANGE UP (ref 22–31)
CREAT SERPL-MCNC: 0.8 MG/DL — SIGNIFICANT CHANGE UP (ref 0.5–1.3)
EGFR: 91 ML/MIN/1.73M2 — SIGNIFICANT CHANGE UP
EGFR: 91 ML/MIN/1.73M2 — SIGNIFICANT CHANGE UP
GLUCOSE SERPL-MCNC: 106 MG/DL — HIGH (ref 70–99)
HCT VFR BLD CALC: 41 % — SIGNIFICANT CHANGE UP (ref 39–50)
HGB BLD-MCNC: 13.1 G/DL — SIGNIFICANT CHANGE UP (ref 13–17)
MCHC RBC-ENTMCNC: 27 PG — SIGNIFICANT CHANGE UP (ref 27–34)
MCHC RBC-ENTMCNC: 32 G/DL — SIGNIFICANT CHANGE UP (ref 32–36)
MCV RBC AUTO: 84.5 FL — SIGNIFICANT CHANGE UP (ref 80–100)
NRBC BLD AUTO-RTO: 0 /100 WBCS — SIGNIFICANT CHANGE UP (ref 0–0)
PLATELET # BLD AUTO: 195 K/UL — SIGNIFICANT CHANGE UP (ref 150–400)
POTASSIUM SERPL-MCNC: 4.7 MMOL/L — SIGNIFICANT CHANGE UP (ref 3.5–5.3)
POTASSIUM SERPL-SCNC: 4.7 MMOL/L — SIGNIFICANT CHANGE UP (ref 3.5–5.3)
RBC # BLD: 4.85 M/UL — SIGNIFICANT CHANGE UP (ref 4.2–5.8)
RBC # FLD: 13.2 % — SIGNIFICANT CHANGE UP (ref 10.3–14.5)
SODIUM SERPL-SCNC: 137 MMOL/L — SIGNIFICANT CHANGE UP (ref 135–145)
WBC # BLD: 6.82 K/UL — SIGNIFICANT CHANGE UP (ref 3.8–10.5)
WBC # FLD AUTO: 6.82 K/UL — SIGNIFICANT CHANGE UP (ref 3.8–10.5)

## 2025-06-02 PROCEDURE — 73560 X-RAY EXAM OF KNEE 1 OR 2: CPT | Mod: 26,RT

## 2025-06-02 DEVICE — COMP ATTUNE FEM CRUCIATE SZ5: Type: IMPLANTABLE DEVICE | Site: RIGHT | Status: FUNCTIONAL

## 2025-06-02 DEVICE — IMPLANTABLE DEVICE: Type: IMPLANTABLE DEVICE | Site: RIGHT | Status: FUNCTIONAL

## 2025-06-02 DEVICE — ATTUNE PINNING SYSTEM: Type: IMPLANTABLE DEVICE | Site: RIGHT | Status: FUNCTIONAL

## 2025-06-02 DEVICE — PATELLA ATTUNE MEDIAL DOME 41MM: Type: IMPLANTABLE DEVICE | Site: RIGHT | Status: FUNCTIONAL

## 2025-06-02 RX ORDER — HYDROMORPHONE/SOD CHLOR,ISO/PF 2 MG/10 ML
0.25 SYRINGE (ML) INJECTION EVERY 6 HOURS
Refills: 0 | Status: DISCONTINUED | OUTPATIENT
Start: 2025-06-02 | End: 2025-06-03

## 2025-06-02 RX ORDER — ONDANSETRON HCL/PF 4 MG/2 ML
4 VIAL (ML) INJECTION ONCE
Refills: 0 | Status: DISCONTINUED | OUTPATIENT
Start: 2025-06-02 | End: 2025-06-02

## 2025-06-02 RX ORDER — POLYETHYLENE GLYCOL 3350 17 G/17G
17 POWDER, FOR SOLUTION ORAL AT BEDTIME
Refills: 0 | Status: DISCONTINUED | OUTPATIENT
Start: 2025-06-02 | End: 2025-06-16

## 2025-06-02 RX ORDER — METOPROLOL SUCCINATE 50 MG/1
100 TABLET, EXTENDED RELEASE ORAL DAILY
Refills: 0 | Status: CANCELLED | OUTPATIENT
Start: 2025-06-03 | End: 2025-06-02

## 2025-06-02 RX ORDER — MAGNESIUM HYDROXIDE 400 MG/5ML
30 SUSPENSION ORAL DAILY
Refills: 0 | Status: DISCONTINUED | OUTPATIENT
Start: 2025-06-02 | End: 2025-06-16

## 2025-06-02 RX ORDER — VANCOMYCIN HCL IN 5 % DEXTROSE 1.5G/250ML
1000 PLASTIC BAG, INJECTION (ML) INTRAVENOUS ONCE
Refills: 0 | Status: COMPLETED | OUTPATIENT
Start: 2025-06-02 | End: 2025-06-02

## 2025-06-02 RX ORDER — VANCOMYCIN HCL IN 5 % DEXTROSE 1.5G/250ML
1000 PLASTIC BAG, INJECTION (ML) INTRAVENOUS ONCE
Refills: 0 | Status: COMPLETED | OUTPATIENT
Start: 2025-06-03 | End: 2025-06-03

## 2025-06-02 RX ORDER — ASPIRIN 325 MG
325 TABLET ORAL
Refills: 0 | Status: DISCONTINUED | OUTPATIENT
Start: 2025-06-03 | End: 2025-06-16

## 2025-06-02 RX ORDER — SACUBITRIL AND VALSARTAN 49; 51 MG/1; MG/1
1 TABLET, FILM COATED ORAL
Refills: 0 | Status: DISCONTINUED | OUTPATIENT
Start: 2025-06-02 | End: 2025-06-02

## 2025-06-02 RX ORDER — EZETIMIBE 10 MG/1
10 TABLET ORAL DAILY
Refills: 0 | Status: DISCONTINUED | OUTPATIENT
Start: 2025-06-02 | End: 2025-06-16

## 2025-06-02 RX ORDER — LEVOTHYROXINE SODIUM 300 MCG
88 TABLET ORAL DAILY
Refills: 0 | Status: DISCONTINUED | OUTPATIENT
Start: 2025-06-02 | End: 2025-06-16

## 2025-06-02 RX ORDER — ATORVASTATIN CALCIUM 80 MG/1
80 TABLET, FILM COATED ORAL AT BEDTIME
Refills: 0 | Status: DISCONTINUED | OUTPATIENT
Start: 2025-06-02 | End: 2025-06-02

## 2025-06-02 RX ORDER — HYDROMORPHONE/SOD CHLOR,ISO/PF 2 MG/10 ML
0.5 SYRINGE (ML) INJECTION
Refills: 0 | Status: DISCONTINUED | OUTPATIENT
Start: 2025-06-02 | End: 2025-06-02

## 2025-06-02 RX ORDER — SODIUM CHLORIDE 9 G/1000ML
1000 INJECTION, SOLUTION INTRAVENOUS
Refills: 0 | Status: DISCONTINUED | OUTPATIENT
Start: 2025-06-02 | End: 2025-06-02

## 2025-06-02 RX ORDER — ONDANSETRON HCL/PF 4 MG/2 ML
4 VIAL (ML) INJECTION EVERY 6 HOURS
Refills: 0 | Status: DISCONTINUED | OUTPATIENT
Start: 2025-06-02 | End: 2025-06-16

## 2025-06-02 RX ORDER — TRAMADOL HYDROCHLORIDE 50 MG/1
50 TABLET, FILM COATED ORAL EVERY 6 HOURS
Refills: 0 | Status: DISCONTINUED | OUTPATIENT
Start: 2025-06-02 | End: 2025-06-02

## 2025-06-02 RX ORDER — SENNA 187 MG
2 TABLET ORAL AT BEDTIME
Refills: 0 | Status: DISCONTINUED | OUTPATIENT
Start: 2025-06-02 | End: 2025-06-16

## 2025-06-02 RX ORDER — EZETIMIBE 10 MG/1
10 TABLET ORAL DAILY
Refills: 0 | Status: DISCONTINUED | OUTPATIENT
Start: 2025-06-02 | End: 2025-06-02

## 2025-06-02 RX ORDER — TRAMADOL HYDROCHLORIDE 50 MG/1
25 TABLET, FILM COATED ORAL EVERY 6 HOURS
Refills: 0 | Status: DISCONTINUED | OUTPATIENT
Start: 2025-06-02 | End: 2025-06-02

## 2025-06-02 RX ORDER — ATORVASTATIN CALCIUM 80 MG/1
80 TABLET, FILM COATED ORAL AT BEDTIME
Refills: 0 | Status: DISCONTINUED | OUTPATIENT
Start: 2025-06-02 | End: 2025-06-16

## 2025-06-02 RX ORDER — METOPROLOL SUCCINATE 50 MG/1
100 TABLET, EXTENDED RELEASE ORAL DAILY
Refills: 0 | Status: DISCONTINUED | OUTPATIENT
Start: 2025-06-02 | End: 2025-06-16

## 2025-06-02 RX ORDER — ACETAMINOPHEN 500 MG/5ML
650 LIQUID (ML) ORAL EVERY 6 HOURS
Refills: 0 | Status: DISCONTINUED | OUTPATIENT
Start: 2025-06-02 | End: 2025-06-16

## 2025-06-02 RX ORDER — ACETAMINOPHEN 500 MG/5ML
1000 LIQUID (ML) ORAL ONCE
Refills: 0 | Status: DISCONTINUED | OUTPATIENT
Start: 2025-06-02 | End: 2025-06-16

## 2025-06-02 RX ORDER — LEVOTHYROXINE SODIUM 300 MCG
88 TABLET ORAL DAILY
Refills: 0 | Status: CANCELLED | OUTPATIENT
Start: 2025-06-03 | End: 2025-06-02

## 2025-06-02 RX ORDER — SACUBITRIL AND VALSARTAN 49; 51 MG/1; MG/1
1 TABLET, FILM COATED ORAL
Refills: 0 | Status: DISCONTINUED | OUTPATIENT
Start: 2025-06-02 | End: 2025-06-16

## 2025-06-02 RX ADMIN — POLYETHYLENE GLYCOL 3350 17 GRAM(S): 17 POWDER, FOR SOLUTION ORAL at 21:56

## 2025-06-02 RX ADMIN — SODIUM CHLORIDE 75 MILLILITER(S): 9 INJECTION, SOLUTION INTRAVENOUS at 16:22

## 2025-06-02 RX ADMIN — Medication 250 MILLIGRAM(S): at 13:23

## 2025-06-02 RX ADMIN — Medication 650 MILLIGRAM(S): at 18:25

## 2025-06-02 RX ADMIN — SACUBITRIL AND VALSARTAN 1 TABLET(S): 49; 51 TABLET, FILM COATED ORAL at 21:54

## 2025-06-02 RX ADMIN — Medication 0.25 MILLIGRAM(S): at 19:00

## 2025-06-02 RX ADMIN — ATORVASTATIN CALCIUM 80 MILLIGRAM(S): 80 TABLET, FILM COATED ORAL at 21:54

## 2025-06-02 RX ADMIN — Medication 2 TABLET(S): at 21:54

## 2025-06-02 RX ADMIN — Medication 0.25 MILLIGRAM(S): at 18:25

## 2025-06-02 NOTE — PROGRESS NOTE ADULT - SUBJECTIVE AND OBJECTIVE BOX
Postop Check    Patient tolerated the procedure well. Patient seen and examined at bedside.  No acute complaints at this time. Pain well controlled. Denies chest pain, shortness of breath, nausea or vomiting.     PE:  Vital Signs Last 24 Hrs  T(C): 36.5 (06-02-25 @ 16:30), Max: 36.5 (06-02-25 @ 11:25)  T(F): 97.7 (06-02-25 @ 16:30), Max: 97.7 (06-02-25 @ 11:25)  HR: 61 (06-02-25 @ 17:00) (58 - 69)  BP: 121/79 (06-02-25 @ 17:00) (100/80 - 147/91)  BP(mean): --  RR: 15 (06-02-25 @ 17:00) (10 - 17)  SpO2: 100% (06-02-25 @ 17:00) (97% - 100%)    General: NAD, resting comfortably in bed  RLE:   Dressing in place C/D/I  SCD in place bilaterally  No calf tenderness   Motor: + EHL/FHL/TA/GSC  +SILT: SPN/DPN/Brigitte/Saph/Tib  DP/PT 2+      A/P:  79y m s/p R TKA POD0. Stable and doing well  -PT/OT  -WBAT RLE   -Pain Control as needed, ice as needed  -DVT ppx to start on POD1 (6/3/25)  -Continue perioperative abx x 24 hours  -FU AM Labs  -Incentive Spirometry  -Medical management appreciated  -Dispo pending PT eval  -Discussed above with Dr. Monsivais, who agrees with plan

## 2025-06-02 NOTE — CONSULT NOTE ADULT - SUBJECTIVE AND OBJECTIVE BOX
Patient is a 79y old  Male who presents with a chief complaint of r knee pain    INTERVAL HPI/OVERNIGHT EVENTS:  T(C): 36.7 (06-02-25 @ 20:42), Max: 36.7 (06-02-25 @ 20:42)  HR: 98 (06-02-25 @ 20:42) (58 - 98)  BP: 115/63 (06-02-25 @ 21:55) (100/80 - 147/91)  RR: 16 (06-02-25 @ 20:42) (10 - 17)  SpO2: 94% (06-02-25 @ 20:42) (94% - 100%)  Wt(kg): --  I&O's Summary    02 Jun 2025 07:01  -  02 Jun 2025 22:16  --------------------------------------------------------  IN: 120 mL / OUT: 0 mL / NET: 120 mL        PAST MEDICAL & SURGICAL HISTORY:  Hypothyroid      Colitis      Unilateral primary osteoarthritis, right hip      Facial eczema      Bilateral knee pain      STEMI (ST elevation myocardial infarction)      Osteoarthritis of right knee      HFrEF (heart failure with reduced ejection fraction)      HLD (hyperlipidemia)      Benign essential HTN      History of left hip replacement      H/O colonoscopy      S/P hip replacement, right      CAD (coronary artery disease)      Stented coronary artery          SOCIAL HISTORY  Alcohol:neg  Tobacco: neg  Illicit substance use: neg      FAMILY HISTORY:    Home Medications:  aspirin 81 mg oral tablet: 1 orally once a day (in the morning) (02 Jun 2025 11:14)  Entresto 97 mg-103 mg oral tablet: 1 tab(s) orally 2 times a day (02 Jun 2025 11:14)  ezetimibe 10 mg oral tablet: 1 tab(s) orally once a day (in the morning) (02 Jun 2025 11:14)  levothyroxine 88 mcg (0.088 mg) oral tablet: 1 tab(s) orally once a day (in the morning) (02 Jun 2025 11:14)  metoprolol succinate 100 mg oral tablet, extended release: 1 tab(s) orally once a day (in the afternoon) (02 Jun 2025 11:14)        LABS:                        13.1   6.82  )-----------( 195      ( 02 Jun 2025 16:09 )             41.0     06-02    137  |  103  |  14  ----------------------------<  106[H]  4.7   |  31  |  0.80    Ca    8.6      02 Jun 2025 16:09        Urinalysis Basic - ( 02 Jun 2025 16:09 )    Color: x / Appearance: x / SG: x / pH: x  Gluc: 106 mg/dL / Ketone: x  / Bili: x / Urobili: x   Blood: x / Protein: x / Nitrite: x   Leuk Esterase: x / RBC: x / WBC x   Sq Epi: x / Non Sq Epi: x / Bacteria: x      CAPILLARY BLOOD GLUCOSE            Urinalysis Basic - ( 02 Jun 2025 16:09 )    Color: x / Appearance: x / SG: x / pH: x  Gluc: 106 mg/dL / Ketone: x  / Bili: x / Urobili: x   Blood: x / Protein: x / Nitrite: x   Leuk Esterase: x / RBC: x / WBC x   Sq Epi: x / Non Sq Epi: x / Bacteria: x        MEDICATIONS  (STANDING):  acetaminophen     Tablet .. 650 milliGRAM(s) Oral every 6 hours  acetaminophen   IVPB .. 1000 milliGRAM(s) IV Intermittent once  atorvastatin 80 milliGRAM(s) Oral at bedtime  ezetimibe 10 milliGRAM(s) Oral daily  levothyroxine 88 MICROGram(s) Oral daily  metoprolol succinate  milliGRAM(s) Oral daily  pantoprazole    Tablet 40 milliGRAM(s) Oral before breakfast  polyethylene glycol 3350 17 Gram(s) Oral at bedtime  sacubitril 97 mG/valsartan 103 mG 1 Tablet(s) Oral two times a day  senna 2 Tablet(s) Oral at bedtime  sodium chloride 0.9%. 1000 milliLiter(s) (60 mL/Hr) IV Continuous <Continuous>    MEDICATIONS  (PRN):  HYDROmorphone  Injectable 0.25 milliGRAM(s) IV Push every 6 hours PRN Severe Pain (7 - 10)  magnesium hydroxide Suspension 30 milliLiter(s) Oral daily PRN Constipation  ondansetron Injectable 4 milliGRAM(s) IV Push every 6 hours PRN Nausea and/or Vomiting  traMADol 25 milliGRAM(s) Oral every 6 hours PRN Mild Pain (1 - 3)  traMADol 50 milliGRAM(s) Oral every 6 hours PRN Moderate Pain (4 - 6)      REVIEW OF SYSTEMS:  CONSTITUTIONAL: No fever, weight loss, or fatigue  EYES: No eye pain, visual disturbances, or discharge  ENMT:  No difficulty hearing, tinnitus, vertigo; No sinus or throat pain  NECK: No pain or stiffness  RESPIRATORY: No cough, wheezing, chills or hemoptysis; No shortness of breath  CARDIOVASCULAR: No chest pain, palpitations, dizziness, or leg swelling  GASTROINTESTINAL: No abdominal or epigastric pain. No nausea, vomiting, or hematemesis; No diarrhea or constipation. No melena or hematochezia.  GENITOURINARY: No dysuria, frequency, hematuria, or incontinence  NEUROLOGICAL: No headaches, memory loss, loss of strength, numbness, or tremors  SKIN: No itching, burning, rashes, or lesions   LYMPH NODES: No enlarged glands  ENDOCRINE: No heat or cold intolerance; No hair loss  MUSCULOSKELETAL: No joint pain or swelling; No muscle, back, or extremity pain  PSYCHIATRIC: No depression, anxiety, mood swings, or difficulty sleeping  HEME/LYMPH: No easy bruising, or bleeding gums  ALLERY AND IMMUNOLOGIC: No hives or eczema    RADIOLOGY & ADDITIONAL TESTS:    Imaging Personally Reviewed:  [ x] YES  [ ] NO    Consultant(s) Notes Reviewed:  [ x] YES  [ ] NO        PHYSICAL EXAM:  GENERAL: NAD, well-groomed, well-developed  HEAD:  Atraumatic, Normocephalic  EYES: EOMI, PERRLA, conjunctiva and sclera clear  ENMT: No tonsillar erythema, exudates, or enlargement; Moist mucous membranes, Good dentition, No lesions  NECK: Supple, No JVD, Normal thyroid  NERVOUS SYSTEM:  Alert & Oriented X3, Good concentration; Motor Strength 5/5 B/L upper and lower extremities; DTRs 2+ intact and symmetric  CHEST/LUNG: Clear to percussion bilaterally; No rales, rhonchi, wheezing, or rubs  HEART: Regular rate and rhythm; No murmurs, rubs, or gallops  ABDOMEN: Soft, Nontender, Nondistended; Bowel sounds present  EXTREMITIES:  2+ Peripheral Pulses, No clubbing, cyanosis, or edema  LYMPH: No lymphadenopathy noted  SKIN: No rashes or lesions    Care Discussed with Consultants/Other Providers [ x] YES  [ ] NO  advance care planning and advance directives discussed, including long term care planning [x]YES  [ ]NO  family caregiver training provided [x]YES  [ ]NO  time spent 45min

## 2025-06-02 NOTE — PHYSICAL THERAPY INITIAL EVALUATION ADULT - ADDITIONAL COMMENTS
Patient lives in private home, +4 JESSI + 1 landing then resides on main floor. Patient was independent in all ADLs and ambulated independently without device, has RW at home

## 2025-06-02 NOTE — PATIENT PROFILE ADULT - LIVING ENVIRONMENT
[facility]  [address]  [facility] [address]  Phone: [phone]    June 26, 2017      RE: Phil Hawk  [address]   [place] [address]       To whom it may concern:    Phil Hawk was seen in our clinic on 5/26/17. He may return to work with the following: No working or lifting restrictions on 6/26/17          Sincerely,    LAUREN Guallpa MD/             no

## 2025-06-03 ENCOUNTER — TRANSCRIPTION ENCOUNTER (OUTPATIENT)
Age: 80
End: 2025-06-03

## 2025-06-03 VITALS
OXYGEN SATURATION: 94 % | TEMPERATURE: 98 F | HEART RATE: 84 BPM | RESPIRATION RATE: 18 BRPM | SYSTOLIC BLOOD PRESSURE: 109 MMHG | DIASTOLIC BLOOD PRESSURE: 63 MMHG

## 2025-06-03 LAB
ANION GAP SERPL CALC-SCNC: 12 MMOL/L — SIGNIFICANT CHANGE UP (ref 5–17)
BUN SERPL-MCNC: 23 MG/DL — SIGNIFICANT CHANGE UP (ref 7–23)
CALCIUM SERPL-MCNC: 8.6 MG/DL — SIGNIFICANT CHANGE UP (ref 8.5–10.1)
CHLORIDE SERPL-SCNC: 98 MMOL/L — SIGNIFICANT CHANGE UP (ref 96–108)
CO2 SERPL-SCNC: 23 MMOL/L — SIGNIFICANT CHANGE UP (ref 22–31)
CREAT SERPL-MCNC: 0.96 MG/DL — SIGNIFICANT CHANGE UP (ref 0.5–1.3)
EGFR: 81 ML/MIN/1.73M2 — SIGNIFICANT CHANGE UP
EGFR: 81 ML/MIN/1.73M2 — SIGNIFICANT CHANGE UP
GLUCOSE SERPL-MCNC: 132 MG/DL — HIGH (ref 70–99)
HCT VFR BLD CALC: 32.9 % — LOW (ref 39–50)
HGB BLD-MCNC: 11 G/DL — LOW (ref 13–17)
MCHC RBC-ENTMCNC: 28.1 PG — SIGNIFICANT CHANGE UP (ref 27–34)
MCHC RBC-ENTMCNC: 33.4 G/DL — SIGNIFICANT CHANGE UP (ref 32–36)
MCV RBC AUTO: 84.1 FL — SIGNIFICANT CHANGE UP (ref 80–100)
NRBC BLD AUTO-RTO: 0 /100 WBCS — SIGNIFICANT CHANGE UP (ref 0–0)
PLATELET # BLD AUTO: 226 K/UL — SIGNIFICANT CHANGE UP (ref 150–400)
POTASSIUM SERPL-MCNC: 4.1 MMOL/L — SIGNIFICANT CHANGE UP (ref 3.5–5.3)
POTASSIUM SERPL-SCNC: 4.1 MMOL/L — SIGNIFICANT CHANGE UP (ref 3.5–5.3)
RBC # BLD: 3.91 M/UL — LOW (ref 4.2–5.8)
RBC # FLD: 13.4 % — SIGNIFICANT CHANGE UP (ref 10.3–14.5)
SODIUM SERPL-SCNC: 133 MMOL/L — LOW (ref 135–145)
WBC # BLD: 24.65 K/UL — HIGH (ref 3.8–10.5)
WBC # FLD AUTO: 24.65 K/UL — HIGH (ref 3.8–10.5)

## 2025-06-03 PROCEDURE — 97530 THERAPEUTIC ACTIVITIES: CPT

## 2025-06-03 PROCEDURE — 27447 TOTAL KNEE ARTHROPLASTY: CPT | Mod: RT

## 2025-06-03 PROCEDURE — 85027 COMPLETE CBC AUTOMATED: CPT

## 2025-06-03 PROCEDURE — 97116 GAIT TRAINING THERAPY: CPT

## 2025-06-03 PROCEDURE — 86850 RBC ANTIBODY SCREEN: CPT

## 2025-06-03 PROCEDURE — C1713: CPT

## 2025-06-03 PROCEDURE — 97162 PT EVAL MOD COMPLEX 30 MIN: CPT

## 2025-06-03 PROCEDURE — 86901 BLOOD TYPING SEROLOGIC RH(D): CPT

## 2025-06-03 PROCEDURE — 36415 COLL VENOUS BLD VENIPUNCTURE: CPT

## 2025-06-03 PROCEDURE — C9399: CPT

## 2025-06-03 PROCEDURE — 97165 OT EVAL LOW COMPLEX 30 MIN: CPT

## 2025-06-03 PROCEDURE — 73560 X-RAY EXAM OF KNEE 1 OR 2: CPT

## 2025-06-03 PROCEDURE — 86900 BLOOD TYPING SEROLOGIC ABO: CPT

## 2025-06-03 PROCEDURE — 80048 BASIC METABOLIC PNL TOTAL CA: CPT

## 2025-06-03 PROCEDURE — C1776: CPT

## 2025-06-03 RX ORDER — ASPIRIN 325 MG
1 TABLET ORAL
Qty: 60 | Refills: 0
Start: 2025-06-03 | End: 2025-07-02

## 2025-06-03 RX ORDER — POLYETHYLENE GLYCOL 3350 17 G/17G
17 POWDER, FOR SOLUTION ORAL
Qty: 0 | Refills: 0 | DISCHARGE
Start: 2025-06-03

## 2025-06-03 RX ORDER — ACETAMINOPHEN 500 MG/5ML
2 LIQUID (ML) ORAL
Qty: 0 | Refills: 0 | DISCHARGE
Start: 2025-06-03

## 2025-06-03 RX ORDER — TRAMADOL HYDROCHLORIDE 50 MG/1
1 TABLET, FILM COATED ORAL
Qty: 25 | Refills: 0
Start: 2025-06-03

## 2025-06-03 RX ORDER — SENNA 187 MG
2 TABLET ORAL
Qty: 20 | Refills: 0
Start: 2025-06-03

## 2025-06-03 RX ADMIN — SACUBITRIL AND VALSARTAN 1 TABLET(S): 49; 51 TABLET, FILM COATED ORAL at 07:11

## 2025-06-03 RX ADMIN — Medication 650 MILLIGRAM(S): at 12:21

## 2025-06-03 RX ADMIN — Medication 250 MILLIGRAM(S): at 02:00

## 2025-06-03 RX ADMIN — Medication 0.25 MILLIGRAM(S): at 02:11

## 2025-06-03 RX ADMIN — Medication 88 MICROGRAM(S): at 05:21

## 2025-06-03 RX ADMIN — Medication 0.25 MILLIGRAM(S): at 01:39

## 2025-06-03 RX ADMIN — EZETIMIBE 10 MILLIGRAM(S): 10 TABLET ORAL at 12:21

## 2025-06-03 NOTE — OCCUPATIONAL THERAPY INITIAL EVALUATION ADULT - NSOTDISCHREC_GEN_A_CORE
multiple medical complaints to practice safe tub transfers when strength/sensation improve- pt verb agreement. Pt reports girlfriend can assist PRN upon d/c/Home OT

## 2025-06-03 NOTE — CARE COORDINATION ASSESSMENT. - PRO ARRIVE FROM
CM met w pt. Per pt lives with girlfrienryley Oconnell in a house with 4 stairs to enter home, 0 steps inside. Pt was independent w ADL, ambulation, driving and med management prior to admission. Pt drives to appointments, chose [facility] [phone], has walker, cane. CM verified: PCP: Dr. Garcia Pharmacy: [facility]. : stated yes. Pt stated family will  when medically safe for discharge./home

## 2025-06-03 NOTE — DISCHARGE NOTE NURSING/CASE MANAGEMENT/SOCIAL WORK - PATIENT PORTAL LINK FT
You can access the FollowMyHealth Patient Portal offered by [facility] by registering at the following website: [email or web address]. By joining [facility]’s FollowMyHealth portal, you will also be able to view your health information using other applications (apps) compatible with our system.

## 2025-06-03 NOTE — CARE COORDINATION ASSESSMENT. - NSCAREPROVIDERS_GEN_ALL_CORE_FT
CARE PROVIDERS:  Administration: Ulises Kinney  Administration: Philippe Arriola  Administration: Suzanna Griffin  Admitting: Lit Monsivais  Attending: Lit Monsivais  Case Management: Chris Black  Consultant: Perlman, Daryl  Consultant: Marques Verdin  Consultant: Bryan Marshall  Consultant: Ananda Meyers  Consultant: Abigail Fountain  Consultant: Krish Verdin  Consultant: Mi Jean  Nurse: Gabriela France  Nurse: Arleen Montanez  Occupational Therapy: Pam Segura  Ordered: ADM, User  Ordered: Doctor, Unknown  Outpatient Provider: Yaakov Chavira  Override: Latasha Rogers  Override: June Low  PCA/Nursing Assistant: Tracy Landers  Primary Team: Elisa Jones  Primary Team: Raffi Garcia  Primary Team: Zoran Charles  Primary Team: Lv Keane  Registered Dietitian: Vero Matos// Supp. Assoc.: Latasha Rogers

## 2025-06-03 NOTE — OCCUPATIONAL THERAPY INITIAL EVALUATION ADULT - ADDITIONAL COMMENTS
Pt lives in a private home with 4 steps to enter then stays on 1 level. Bathroom has a tub, raised toilet, grab bars, shower chair. PTA pt was independent with ADLs and functional mobility without AD. Pt has a RW, sock aid, and shoe horn from prior surgery.

## 2025-06-03 NOTE — PROGRESS NOTE ADULT - SUBJECTIVE AND OBJECTIVE BOX
Orthopaedic PA    Procedure: s/p Right TKR  Surgeon: Loi    79y Male comfortable, c/o knee swelling and numbness from his right knee down to his foot with difficulty moving his foot up and down.     Denies chest pain, shortness of breath, palpitations, nausea, vomiting, dizziness, fevers, chills    PE:  Vital Signs Last 24 Hrs  T(C): 36.9 (03 Jun 2025 05:21), Max: 36.9 (03 Jun 2025 05:21)  T(F): 98.5 (03 Jun 2025 05:21), Max: 98.5 (03 Jun 2025 05:21)  HR: 70 (03 Jun 2025 05:21) (58 - 98)  BP: 104/60 (03 Jun 2025 05:21) (100/80 - 147/91)  RR: 18 (03 Jun 2025 05:21) (10 - 18)  SpO2: 94% (03 Jun 2025 05:21) (94% - 100%)    Parameters below as of 03 Jun 2025 05:21  Patient On (Oxygen Delivery Method): room air      General:  A + O x 3 in NAD    Knee: Aquacel  Dressing: C/D/I     Lower Extremity Exam:         1/5 Tibialis Anterior ( dorsiflexion )      1/5 Gastrocsoleus ( plantarflexion )      1/5 Extensor Hallucis Longus ( toe extension )      1/5  Flexor Hallucis Longus ( toe flexion)            Decreased sensation to Light touch L2-S1    +2 DP/PT Pulses  Compartments soft and compressible  No calf tenderness bilaterally                          13.1   6.82  )-----------( 195      ( 02 Jun 2025 16:09 )             41.0       06-02    137  |  103  |  14  ----------------------------<  106[H]  4.7   |  31  |  0.80    Ca    8.6      02 Jun 2025 16:09        A/P: 79y Male stable POD# 1 s/p Right TKR     - Pain control   - Incentive spirometer  - DVT ppx: SCD / Chemical Aspirin 325mg BID   - Ambulation as tolerated  - PT, OT WBAT right leg   - F/U AM Labs  - Discharge planning home v rehab          Orthopaedic PA    Procedure: s/p Right TKR  Surgeon: Loi    79y Male comfortable, c/o knee swelling and numbness from his right knee down to his foot with difficulty moving his foot up and down.     Denies chest pain, shortness of breath, palpitations, nausea, vomiting, dizziness, fevers, chills    PE:  Vital Signs Last 24 Hrs  T(C): 36.9 (03 Jun 2025 05:21), Max: 36.9 (03 Jun 2025 05:21)  T(F): 98.5 (03 Jun 2025 05:21), Max: 98.5 (03 Jun 2025 05:21)  HR: 70 (03 Jun 2025 05:21) (58 - 98)  BP: 104/60 (03 Jun 2025 05:21) (100/80 - 147/91)  RR: 18 (03 Jun 2025 05:21) (10 - 18)  SpO2: 94% (03 Jun 2025 05:21) (94% - 100%)    Parameters below as of 03 Jun 2025 05:21  Patient On (Oxygen Delivery Method): room air      General:  A + O x 3 in NAD    Knee: Aquacel  Dressing: C/D/I     Lower Extremity Exam:         1/5 Tibialis Anterior ( dorsiflexion )      1/5 Gastrocsoleus ( plantarflexion )      1/5 Extensor Hallucis Longus ( toe extension )      1/5  Flexor Hallucis Longus ( toe flexion)            Decreased sensation to Light touch L2-S1    +2 DP/PT Pulses  Compartments soft and compressible  No calf tenderness bilaterally                          13.1   6.82  )-----------( 195      ( 02 Jun 2025 16:09 )             41.0       06-02    137  |  103  |  14  ----------------------------<  106[H]  4.7   |  31  |  0.80    Ca    8.6      02 Jun 2025 16:09        A/P: 79y Male stable POD# 1 s/p Right TKR     - Pain control   - Incentive spirometer  - DVT ppx: SCD / Chemical Aspirin 325mg BID   - Ambulation as tolerated  - PT, OT WBAT right leg   - F/U AM Labs  - Decreased dorsi and plantar flexion and decreased sensation most likely caused by anesthesia block, will continue to monitor, Dr. Monsivais aware   - Discharge planning home   - plan and care discussed with Dr. Monsivais

## 2025-06-03 NOTE — CARE COORDINATION ASSESSMENT. - NSPASTMEDSURGHISTORY_GEN_ALL_CORE_FT
PAST MEDICAL & SURGICAL HISTORY:  Hypothyroid      History of left hip replacement      Colitis      STEMI (ST elevation myocardial infarction)      Bilateral knee pain      Facial eczema      Unilateral primary osteoarthritis, right hip      H/O colonoscopy      Benign essential HTN      HLD (hyperlipidemia)      HFrEF (heart failure with reduced ejection fraction)      Osteoarthritis of right knee      Stented coronary artery      CAD (coronary artery disease)      S/P hip replacement, right

## 2025-06-03 NOTE — ASU DISCHARGE PLAN (ADULT/PEDIATRIC) - FINANCIAL ASSISTANCE
[facility] provides services at a reduced cost to those who are determined to be eligible through [facility]’s financial assistance program. Information regarding [facility]’s financial assistance program can be found by going to [email or web address] or by calling [phone].

## 2025-06-03 NOTE — ASU DISCHARGE PLAN (ADULT/PEDIATRIC) - CARE PROVIDER_API CALL
Lit Monsivais  Orthopaedic Surgery  [address] [place] [id number]  Phone: [phone]  Fax: [phone]  Follow Up Time:

## 2025-06-03 NOTE — CASE MANAGEMENT PROGRESS NOTE - NSCMPROGRESSNOTE_GEN_ALL_CORE
Per MD pt is medically cleared for discharge today 6/3/25. CM met with patient to discuss transition of care. Pt is to be transitioned to home with [facility] [phone] pending acceptance. CM explained home care services, expectations, process, insurance provisions and home safety with pt verbalizing understanding. Pt states his girlfriend Kourtney will assist post discharge. Pt aware of plan and in agreement / verbalizes understanding. [facility]. community MD is Dr. Garcia. Pt stated they would make their own follow up appointments. Per pt DMEs in home include walker, cane. Pt stated his family will transport pt home. All questions answered. CM discussed plan with MD and RN. CM to remain available through hospitalization.

## 2025-06-03 NOTE — CHART NOTE - NSCHARTNOTEFT_GEN_A_CORE
Called by RN at 0530 this AM for patient with new R foot mobility issues. Patient seen and examined at bedside. Reports he was able to flex his foot and wiggle his toes last night, however upon getting up this AM to use the bathroom he realized he was unable to angle his foot and needed to take small steps while ambulating. Reports increased swelling to the R knee and "tightness" to the skin of the RLE. Patient denies any pain, numbness, tingling to the R foot. Denies fever, chills, sob, chest pain, pain, paresthesias.    Vital Signs Last 24 Hrs  T(C): 36.9 (03 Jun 2025 05:21), Max: 36.9 (03 Jun 2025 05:21)  T(F): 98.5 (03 Jun 2025 05:21), Max: 98.5 (03 Jun 2025 05:21)  HR: 70 (03 Jun 2025 05:21) (58 - 98)  BP: 104/60 (03 Jun 2025 05:21) (100/80 - 147/91)  RR: 18 (03 Jun 2025 05:21) (10 - 18)  SpO2: 94% (03 Jun 2025 05:21) (94% - 100%)    Parameters below as of 03 Jun 2025 05:21  Patient On (Oxygen Delivery Method): room air    On exam:  GENERAL: No acute distress, lying comfortably in bed  HEAD:  Atraumatic, Normocephalic  CHEST/LUNG: Non labored respirations, no accessory muscle use  HEART: Regular rate and rhythm  ABDOMEN: Soft, non-tender, non-distended  EXT:   RLE - dressing to R knee in place with surrounding edema, dressing c/d/i. Compartments soft throughout RLE. 2+ palpable pulses to the femoral, popliteal, DP and PT; skin warm to touch. Sensation intact throughout RLE including plantar and dorsal aspect of foot. Impaired motor function with plantar flexion and dorsiflexion of R foot 1/5, impaired mobility of R toes 1/5. Passive range of motion intact. Full active ROM to R hip and R knee. Non-tender to palpation.  LLE - Full active ROM throughout LLE. Palpable pulses throughout LLE, sensation intact throughout. Skin warm to touch. Non-tender to palpation.  NEUROLOGY: A&O x 3    A/P: 79yoM with PMHx CAD, CHF, HTN, now POD#1 s/p R TKA.  - Recc'd flexion at R knee  - Endorsed to day team, day team to follow up

## 2025-06-03 NOTE — ASU DISCHARGE PLAN (ADULT/PEDIATRIC) - COMMENTS
As certified below, I, or a nurse practitioner or physican assistant working with me, had a face to face encounter that meets the physican face to face encounter requirements. Patient needs home with home care for anand and jes PT.

## 2025-06-03 NOTE — ASU DISCHARGE PLAN (ADULT/PEDIATRIC) - NS MD DC FALL RISK RISK
For information on Fall & Injury Prevention, visit: [email or web address] OR  [email or web address] OR  https://www.cdc.gov/steadi/patient.html

## 2025-06-03 NOTE — ASU DISCHARGE PLAN (ADULT/PEDIATRIC) - ASU DC SPECIAL INSTRUCTIONSFT
Weight Bearing As Tolerated  Aspirin 325mg twice a day starting 6/3/25. You may resume 81mg Aspirin after your course of 325mg.   Follow Up With Dr. Monsivais In 2 Weeks   Call [phone] For An Appointment.  Keep Knee Aquacel  Dressing  On Dry And Clean, It Will Be Changed Or Removed On Your Next Office Visit.  ***AFO Brace??     As certified below, I, or a nurse practitioner, or a physician assistant working with me, had a face to face encounter that meets the physician face to face encounter requirements.   Encounter Date 6/3/25.   My signature below certifies that the above stated patient is home bound and upon completion of the face to face encounter has the need for intermittent skilled nursing, physical therapy and or speech or occupational therapy services in their home for their current diagnosis as outlined in their initial plan of care. These services will continue to be monitored by myself or another clinician. Patient needs home services. Weight Bearing As Tolerated  Aspirin 325mg twice a day starting 6/3/25. You may resume 81mg Aspirin after your course of 325mg.   Follow Up With Dr. Monsivais In 2 Weeks   Call [phone] For An Appointment.  Keep Knee Aquacel  Dressing  On Dry And Clean, It Will Be Changed Or Removed On Your Next Office Visit.      As certified below, I, or a nurse practitioner, or a physician assistant working with me, had a face to face encounter that meets the physician face to face encounter requirements.   Encounter Date 6/3/25.   My signature below certifies that the above stated patient is home bound and upon completion of the face to face encounter has the need for intermittent skilled nursing, physical therapy and or speech or occupational therapy services in their home for their current diagnosis as outlined in their initial plan of care. These services will continue to be monitored by myself or another clinician. Patient needs home services. Weight Bearing As Tolerated  Aspirin 325mg twice a day starting 6/3/25. You may resume 81mg Aspirin after your course of 325mg.   Follow Up With Dr. Monsivais In 2 Weeks   Call [phone] For An Appointment.  Keep Knee Aquacel  Dressing  On Dry And Clean, It Will Be Changed Or Removed On Your Next Office Visit.  Follow up with PCP for repeat labs after discharge       As certified below, I, or a nurse practitioner or physican assistant working with me, had a face to face encounter that meets the physican face to face encounter requirements. Patient needs home with home care for browng and visting PT.    Encounter Date 6/3/25.   My signature below certifies that the above stated patient is home bound and upon completion of the face to face encounter has the need for intermittent skilled nursing, physical therapy and or speech or occupational therapy services in their home for their current diagnosis as outlined in their initial plan of care. These services will continue to be monitored by myself or another clinician. Patient needs home services.

## 2025-06-03 NOTE — OCCUPATIONAL THERAPY INITIAL EVALUATION ADULT - RANGE OF MOTION EXAMINATION, UPPER EXTREMITY
WFL BUE FMC, grossly observed during ADLs/bilateral UE Active ROM was WFL  (within functional limits)

## 2025-06-03 NOTE — DISCHARGE NOTE NURSING/CASE MANAGEMENT/SOCIAL WORK - FINANCIAL ASSISTANCE
[facility] provides services at a reduced cost to those who are determined to be eligible through [facility]’s financial assistance program. Information regarding [facility]’s financial assistance program can be found by going to [email or web address] or by calling [phone].

## 2025-06-03 NOTE — PATIENT CHOICE NOTE. - NSPTCHOICENOTES_GEN_ALL_CORE
D/C resource folder provided at bedside which includes lists for both rehab facilities and home care agencies and transportation letter advising on ambulance and ambulette transportation. CM reviewed folder during assessment. Pt chose [facility] [phone]

## 2025-09-02 ENCOUNTER — APPOINTMENT (OUTPATIENT)
Dept: CARDIOLOGY | Facility: CLINIC | Age: 80
End: 2025-09-02
Payer: MEDICARE

## 2025-09-02 PROCEDURE — 93306 TTE W/DOPPLER COMPLETE: CPT

## 2025-09-09 ENCOUNTER — NON-APPOINTMENT (OUTPATIENT)
Age: 80
End: 2025-09-09

## 2025-09-09 ENCOUNTER — APPOINTMENT (OUTPATIENT)
Dept: CARDIOLOGY | Facility: CLINIC | Age: 80
End: 2025-09-09
Payer: MEDICARE

## 2025-09-09 VITALS
OXYGEN SATURATION: 98 % | BODY MASS INDEX: 24.8 KG/M2 | SYSTOLIC BLOOD PRESSURE: 131 MMHG | WEIGHT: 158 LBS | DIASTOLIC BLOOD PRESSURE: 77 MMHG | HEART RATE: 62 BPM | HEIGHT: 67 IN

## 2025-09-09 DIAGNOSIS — I50.20 UNSPECIFIED SYSTOLIC (CONGESTIVE) HEART FAILURE: ICD-10-CM

## 2025-09-09 DIAGNOSIS — E78.5 HYPERLIPIDEMIA, UNSPECIFIED: ICD-10-CM

## 2025-09-09 DIAGNOSIS — I10 ESSENTIAL (PRIMARY) HYPERTENSION: ICD-10-CM

## 2025-09-09 DIAGNOSIS — I70.90 UNSPECIFIED ATHEROSCLEROSIS: ICD-10-CM

## 2025-09-09 PROCEDURE — G2211 COMPLEX E/M VISIT ADD ON: CPT

## 2025-09-09 PROCEDURE — 93000 ELECTROCARDIOGRAM COMPLETE: CPT

## 2025-09-09 PROCEDURE — 99214 OFFICE O/P EST MOD 30 MIN: CPT

## 2025-09-16 RX ORDER — EZETIMIBE 10 MG/1
10 TABLET ORAL
Qty: 90 | Refills: 3 | Status: DISCONTINUED | COMMUNITY
Start: 2025-09-09 | End: 2025-09-16

## (undated) DEVICE — PLV/PSP-ESU FORCE2 FIL 16736T: Type: DURABLE MEDICAL EQUIPMENT

## (undated) DEVICE — VENODYNE/SCD SLEEVE CALF LARGE

## (undated) DEVICE — VENODYNE/SCD SLEEVE CALF MEDIUM

## (undated) DEVICE — SUT POLYSORB 1 27" GS-12 UNDYED

## (undated) DEVICE — GOWN SMARTGOWN RAGLAN XLG

## (undated) DEVICE — TOURNIQUET CUFF 34" DUAL PORT W PLC

## (undated) DEVICE — NDL HYPO SAFE 18G X 1.5" (PINK)

## (undated) DEVICE — SUT BIOSYN 2-0 30" C-14 UNDYED

## (undated) DEVICE — DRSG DERMABOND MINI

## (undated) DEVICE — SUCTION YANKAUER NO CONTROL VENT

## (undated) DEVICE — GLV 8 PROTEXIS (WHITE)

## (undated) DEVICE — ELCTR BOVIE TIP BLADE INSULATED 2.75" EDGE

## (undated) DEVICE — SUT BIOSYN 2-0 27" GS-21

## (undated) DEVICE — DRAPE 3/4 SHEET W REINFORCEMENT 56X77"

## (undated) DEVICE — SOL INJ NS 0.9% 500ML 2 PORT

## (undated) DEVICE — PACK TOTAL HIP

## (undated) DEVICE — SUT TICRON 1 30" HOS-11

## (undated) DEVICE — SOL INJ NS 0.9% 100ML

## (undated) DEVICE — ELCTR AQUAMANTYS BIPOLAR SEALER 6.0

## (undated) DEVICE — NDL HYPO SAFE 22G X 1.5" (BLACK)

## (undated) DEVICE — SUT NDL MAYO CATGUT 1/2 CIRCLE TAPER POINT 0.050" X 1.716"

## (undated) DEVICE — PLV-STRYKER SYSTEM 8: Type: DURABLE MEDICAL EQUIPMENT

## (undated) DEVICE — DRILL BIT BIOMET 2.9MM

## (undated) DEVICE — ELCTR BOVIE PENCIL HANDPIECE

## (undated) DEVICE — GLV 8 PROTEXIS ORTHO (CREAM)

## (undated) DEVICE — GLV 8.5 PROTEXIS (BLUE)

## (undated) DEVICE — SYR LUER LOK 20CC

## (undated) DEVICE — SYR LUER LOK 50CC

## (undated) DEVICE — SOL IRR BAG NS 0.9% 3000ML

## (undated) DEVICE — SAW BLADE STRYKER RECIPROCATING DOUBLE SIDED 70X12.5X1MM

## (undated) DEVICE — SUT BIOSYN 2-0 27" GS-11

## (undated) DEVICE — HOOD FLYTE STRYKER HELMET SHIELD

## (undated) DEVICE — WARMING BLANKET UPPER ADULT

## (undated) DEVICE — PLV/PSP-TOURNIQUET #8 402007310018: Type: DURABLE MEDICAL EQUIPMENT

## (undated) DEVICE — PLV-SCD MACHINE: Type: DURABLE MEDICAL EQUIPMENT

## (undated) DEVICE — SUT VLOC 180 3-0 18" P-12 UNDYED

## (undated) DEVICE — SOL IRR POUR H2O 1000ML

## (undated) DEVICE — SUT TICRON 1 30" HOS-10

## (undated) DEVICE — DRSG AQUACEL 3.5 X 10"

## (undated) DEVICE — SOL INJ NS 0.9% 500ML 1-PORT

## (undated) DEVICE — SPECIMEN CONTAINER 4OZ

## (undated) DEVICE — GLV 8 PROTEXIS (CREAM) NEU-THERA

## (undated) DEVICE — TOURNIQUET CUFF 30" DUAL PORT W PLC

## (undated) DEVICE — DRAPE XRAY CASSETTE COVER DOUBLE 20X40"

## (undated) DEVICE — DRSG COBAN 4"

## (undated) DEVICE — DRSG DERMABOND 0.7ML

## (undated) DEVICE — PACK TOTAL KNEE

## (undated) DEVICE — SUT POLYSORB 1-0 30" GS-12 UNDYED

## (undated) DEVICE — GOWN XL EXTRA LONG

## (undated) DEVICE — TUBING FOR SMOKE EVACUATOR (PURPLE END)

## (undated) DEVICE — SAW BLADE STRYKER SAGITTAL 21.0X90X1.27MM

## (undated) DEVICE — DRAPE IOBAN 23" X 23"

## (undated) DEVICE — DRAPE INSTRUMENT POUCH 6.75" X 11"

## (undated) DEVICE — ORTHOALIGN PLUS UNIT

## (undated) DEVICE — DRILL BIT J&J DEPUY MITEK 1.8 MM

## (undated) DEVICE — ELCTR BOVIE TIP BLADE VALLEYLAB 6.5"

## (undated) DEVICE — SAW BLADE STRYKER SAGITTAL 25X98.5X1.24MM

## (undated) DEVICE — POSITIONER PAD HIP

## (undated) DEVICE — SAW BLADE STRYKER SAGITTAL DUAL CUT 25X90X1.27MM